# Patient Record
Sex: FEMALE | Race: WHITE | Employment: FULL TIME | URBAN - METROPOLITAN AREA
[De-identification: names, ages, dates, MRNs, and addresses within clinical notes are randomized per-mention and may not be internally consistent; named-entity substitution may affect disease eponyms.]

---

## 2017-02-16 ENCOUNTER — TELEPHONE (OUTPATIENT)
Dept: FAMILY MEDICINE | Facility: OTHER | Age: 47
End: 2017-02-16

## 2017-02-16 NOTE — TELEPHONE ENCOUNTER
12:31 PM    Reason for Call: Phone Call    Description:  Concerned that she has had her menstrual for 2 weeks and was wondering if she should come in  Was an appointment offered for this call? No    Preferred method for responding to this message: Telephone Call    If we cannot reach you directly, may we leave a detailed response at the number you provided? Yes    Can this message wait until your PCP/provider returns, if available today? Not applicable,     Emerita Bolanos

## 2017-02-21 ENCOUNTER — OFFICE VISIT (OUTPATIENT)
Dept: FAMILY MEDICINE | Facility: OTHER | Age: 47
End: 2017-02-21
Attending: ADVANCED PRACTICE MIDWIFE
Payer: COMMERCIAL

## 2017-02-21 VITALS
BODY MASS INDEX: 25.16 KG/M2 | HEART RATE: 77 BPM | WEIGHT: 142 LBS | HEIGHT: 63 IN | OXYGEN SATURATION: 98 % | DIASTOLIC BLOOD PRESSURE: 76 MMHG | SYSTOLIC BLOOD PRESSURE: 120 MMHG

## 2017-02-21 DIAGNOSIS — R35.0 INCREASED FREQUENCY OF URINATION: ICD-10-CM

## 2017-02-21 DIAGNOSIS — N92.0 EXCESSIVE OR FREQUENT MENSTRUATION: Primary | ICD-10-CM

## 2017-02-21 LAB
APTT PPP: 27 SEC (ref 24–37)
ERYTHROCYTE [DISTWIDTH] IN BLOOD BY AUTOMATED COUNT: 12.3 % (ref 10–15)
HCT VFR BLD AUTO: 38.5 % (ref 35–47)
HGB BLD-MCNC: 13.1 G/DL (ref 11.7–15.7)
INR PPP: 0.91 (ref 0.8–1.2)
MCH RBC QN AUTO: 33.5 PG (ref 26.5–33)
MCHC RBC AUTO-ENTMCNC: 34 G/DL (ref 31.5–36.5)
MCV RBC AUTO: 99 FL (ref 78–100)
PLATELET # BLD AUTO: 303 10E9/L (ref 150–450)
RBC # BLD AUTO: 3.91 10E12/L (ref 3.8–5.2)
TSH SERPL DL<=0.05 MIU/L-ACNC: 1.19 MU/L (ref 0.4–4)
WBC # BLD AUTO: 6 10E9/L (ref 4–11)

## 2017-02-21 PROCEDURE — 83001 ASSAY OF GONADOTROPIN (FSH): CPT | Mod: 90 | Performed by: ADVANCED PRACTICE MIDWIFE

## 2017-02-21 PROCEDURE — 99214 OFFICE O/P EST MOD 30 MIN: CPT | Performed by: ADVANCED PRACTICE MIDWIFE

## 2017-02-21 PROCEDURE — 85610 PROTHROMBIN TIME: CPT | Performed by: ADVANCED PRACTICE MIDWIFE

## 2017-02-21 PROCEDURE — 84443 ASSAY THYROID STIM HORMONE: CPT | Performed by: ADVANCED PRACTICE MIDWIFE

## 2017-02-21 PROCEDURE — 99000 SPECIMEN HANDLING OFFICE-LAB: CPT | Performed by: ADVANCED PRACTICE MIDWIFE

## 2017-02-21 PROCEDURE — 36415 COLL VENOUS BLD VENIPUNCTURE: CPT | Performed by: ADVANCED PRACTICE MIDWIFE

## 2017-02-21 PROCEDURE — 85730 THROMBOPLASTIN TIME PARTIAL: CPT | Performed by: ADVANCED PRACTICE MIDWIFE

## 2017-02-21 PROCEDURE — 84146 ASSAY OF PROLACTIN: CPT | Mod: 90 | Performed by: ADVANCED PRACTICE MIDWIFE

## 2017-02-21 PROCEDURE — 85027 COMPLETE CBC AUTOMATED: CPT | Performed by: ADVANCED PRACTICE MIDWIFE

## 2017-02-21 RX ORDER — NORETHINDRONE ACETATE 5 MG
5 TABLET ORAL 3 TIMES DAILY
Qty: 30 TABLET | Refills: 1 | Status: SHIPPED | OUTPATIENT
Start: 2017-02-21 | End: 2017-03-16

## 2017-02-21 ASSESSMENT — PAIN SCALES - GENERAL: PAINLEVEL: MILD PAIN (2)

## 2017-02-21 NOTE — PROGRESS NOTES
"Raine Ayala is a 46 year old female  Here today for excessive uterine bleeding.  Has been bleeding since 17, which is 21 days ago.  Heavy bleeding with clots.  Cycle:  21 days bleedin days with 2 heavy days.  Started menstruation at 15 yo.  .  One vaginal and one C/S.  Two years ago experienced hot flashes and irregular periods, but then it went back to normal.without hot flashes.  Reports excessive fatigue.  Also reports some dizziness and light-headedness and occasional headaches.      O:   /76 (BP Location: Left arm, Patient Position: Chair, Cuff Size: Adult Regular)  Pulse 77  Ht 5' 3\" (1.6 m)  Wt 142 lb (64.4 kg)  LMP 2017  SpO2 98%  BMI 25.15 kg/m2   Pleasant with no sign of acute distress      A:  Menorrhagia:  21 days of bleeding  Needs HPV screen  Increased urinary frequency    P:  CBC stat, TSH, Prolactin FSH, PT, PTT  Pelvic ultrasound ASAP  Norethindrone 5 mg TID    Follow up in one week:  Plan Pap with HR HPV, wet prep, and UA with micro reflex to culture    Greater than 30 minutes were spent face to face counseling this patient on abnormal uterine bleeding, treatment to stop bleeding, ultrasound, procedures, and labs to find cause, and follow up.    NEGRO Gamez, DYLAN    "

## 2017-02-21 NOTE — NURSING NOTE
"Chief Complaint   Patient presents with     Vaginal Bleeding       Initial /76 (BP Location: Left arm, Patient Position: Chair, Cuff Size: Adult Regular)  Pulse 77  Ht 5' 3\" (1.6 m)  Wt 142 lb (64.4 kg)  LMP 01/31/2017  SpO2 98%  BMI 25.15 kg/m2 Estimated body mass index is 25.15 kg/(m^2) as calculated from the following:    Height as of this encounter: 5' 3\" (1.6 m).    Weight as of this encounter: 142 lb (64.4 kg).  Medication Reconciliation: annie Edgar      "

## 2017-02-21 NOTE — MR AVS SNAPSHOT
After Visit Summary   2/21/2017    Raine Ayala    MRN: [id number]           Patient Information     Date Of Birth          1970        Visit Information        Provider Department      2/21/2017 9:00 AM Samuel Chin APRN [facility]        Today's Diagnoses     Excessive or frequent menstruation    -  1    Increased frequency of urination          Care Instructions    Return for follow up in one week.    Schedule ultrasound as soon as possible.        Follow-ups after your visit        Your next 10 appointments already scheduled     Feb 27, 2017  8:30 AM CST   (Arrive by 8:15 AM)   SHORT with NEGRO Vega CNM   [facility] [place] ([facility] [place] [facility])    [address]  [place] [address]   [phone]              Future tests that were ordered for you today     Open Standing Orders        Priority Remaining Interval Expires Ordered    UA with Microscopic reflex to Culture Routine 1/1 2/21/2018 2/21/2017            Who to contact     If you have questions or need follow up information about today's clinic visit or your schedule please contact [facility] directly at [phone].  Normal or non-critical lab and imaging results will be communicated to you by Optianthart, letter or phone within 4 business days after the clinic has received the results. If you do not hear from us within 7 days, please contact the clinic through Optianthart or phone. If you have a critical or abnormal lab result, we will notify you by phone as soon as possible.  Submit refill requests through [employer] or call your pharmacy and they will forward the refill request to us. Please allow 3 business days for your refill to be completed.          Additional Information About Your Visit        Optianthart Information     [employer] gives you secure access to your electronic health record. If you see a primary care provider, you can also send messages to your care team and make  "appointments. If you have questions, please call your primary care clinic.  If you do not have a primary care provider, please call [phone] and they will assist you.        Care EveryWhere ID     This is your Care EveryWhere ID. This could be used by other organizations to access your [facility] medical records  [id number]        Your Vitals Were     Pulse Height Last Period Pulse Oximetry BMI (Body Mass Index)       77 5' 3\" (1.6 m) 01/31/2017 98% 25.15 kg/m2        Blood Pressure from Last 3 Encounters:   02/21/17 120/76   06/15/16 102/64   05/15/15 110/60    Weight from Last 3 Encounters:   02/21/17 142 lb (64.4 kg)   06/15/16 142 lb 3.2 oz (64.5 kg)   05/15/15 144 lb (65.3 kg)              We Performed the Following     CBC with platelets     Follicle stimulating hormone     INR     Partial thromboplastin time     Prolactin     TSH     US Pelvic Complete with Transvaginal          Today's Medication Changes          These changes are accurate as of: 2/21/17 11:32 AM.  If you have any questions, ask your nurse or doctor.               Start taking these medicines.        Dose/Directions    norethindrone 5 MG tablet   Commonly known as:  AYGESTIN   Used for:  Excessive or frequent menstruation   Started by:  Samuel Chin APRN CNM        Dose:  5 mg   Take 1 tablet (5 mg) by mouth 3 times daily   Quantity:  30 tablet   Refills:  1            Where to get your medicines      These medications were sent to [facility] [place] [address] AT [facility] [address] [place] [address]     Phone:  [phone]     norethindrone 5 MG tablet                Primary Care Provider Office Phone # Fax #    Kaila Henry [phone]       [facility] [address]  [place] [address]        Thank you!     Thank you for choosing [facility]  for your care. Our goal is always to provide you with excellent " care. Hearing back from our patients is one way we can continue to improve our services. Please take a few minutes to complete the written survey that you may receive in the mail after your visit with us. Thank you!             Your Updated Medication List - Protect others around you: Learn how to safely use, store and throw away your medicines at www.disposemymeds.org.          This list is accurate as of: 2/21/17 11:32 AM.  Always use your most recent med list.                   Brand Name Dispense Instructions for use    albuterol 108 (90 BASE) MCG/ACT Inhaler    PROAIR HFA/PROVENTIL HFA/VENTOLIN HFA    1 Inhaler    Inhale 2 puffs into the lungs every 4 hours as needed for shortness of breath / dyspnea or wheezing       ibuprofen 800 MG tablet    ADVIL/MOTRIN    90 tablet    TAKE 1 TABLET BY MOUTH EVERY 8 HOURS AS NEEDED       norethindrone 5 MG tablet    AYGESTIN    30 tablet    Take 1 tablet (5 mg) by mouth 3 times daily

## 2017-02-22 ENCOUNTER — HOSPITAL ENCOUNTER (OUTPATIENT)
Dept: ULTRASOUND IMAGING | Facility: HOSPITAL | Age: 47
Discharge: HOME | End: 2017-02-22
Attending: ADVANCED PRACTICE MIDWIFE | Admitting: ADVANCED PRACTICE MIDWIFE
Payer: COMMERCIAL

## 2017-02-22 LAB
FSH SERPL-ACNC: 8 IU/L
PROLACTIN SERPL-MCNC: 24 UG/L (ref 3–27)

## 2017-02-22 PROCEDURE — 76856 US EXAM PELVIC COMPLETE: CPT | Mod: TC

## 2017-02-22 PROCEDURE — 76830 TRANSVAGINAL US NON-OB: CPT | Mod: TC

## 2017-02-23 ENCOUNTER — TELEPHONE (OUTPATIENT)
Dept: OBGYN | Facility: OTHER | Age: 47
End: 2017-02-23

## 2017-02-23 NOTE — TELEPHONE ENCOUNTER
Informed pt of lab and US results.  Discussed EMB and Mirena insertion on Monday at her appt.  Reviewed risks and benefits and procedure.  Questions answered.

## 2017-02-23 NOTE — TELEPHONE ENCOUNTER
1:19 PM    Reason for Call: Phone Call    Description: Pt called and stated provider had called and wanted her to return phone call. Please call her back at [phone]    Was an appointment offered for this call? No    Preferred method for responding to this message: Telephone Call    If we cannot reach you directly, may we leave a detailed response at the number you provided? Yes    Can this message wait until your PCP/provider returns, if available today? Not applicable    Toyin Castro

## 2017-02-27 ENCOUNTER — OFFICE VISIT (OUTPATIENT)
Dept: OBGYN | Facility: OTHER | Age: 47
End: 2017-02-27
Attending: ADVANCED PRACTICE MIDWIFE
Payer: COMMERCIAL

## 2017-02-27 VITALS
HEART RATE: 74 BPM | SYSTOLIC BLOOD PRESSURE: 114 MMHG | WEIGHT: 142 LBS | DIASTOLIC BLOOD PRESSURE: 74 MMHG | OXYGEN SATURATION: 98 % | BODY MASS INDEX: 25.16 KG/M2 | HEIGHT: 63 IN

## 2017-02-27 DIAGNOSIS — Z30.430 ENCOUNTER FOR IUD INSERTION: ICD-10-CM

## 2017-02-27 DIAGNOSIS — N92.0 EXCESSIVE OR FREQUENT MENSTRUATION: ICD-10-CM

## 2017-02-27 DIAGNOSIS — N93.9 VAGINAL BLEEDING: Primary | ICD-10-CM

## 2017-02-27 DIAGNOSIS — B96.89 BV (BACTERIAL VAGINOSIS): Primary | ICD-10-CM

## 2017-02-27 DIAGNOSIS — N76.0 BV (BACTERIAL VAGINOSIS): Primary | ICD-10-CM

## 2017-02-27 DIAGNOSIS — Z30.09 FAMILY PLANNING: ICD-10-CM

## 2017-02-27 LAB
ALBUMIN UR-MCNC: NEGATIVE MG/DL
APPEARANCE UR: ABNORMAL
BACTERIA #/AREA URNS HPF: ABNORMAL /HPF
BILIRUB UR QL STRIP: NEGATIVE
COLOR UR AUTO: ABNORMAL
GLUCOSE UR STRIP-MCNC: NEGATIVE MG/DL
HCG UR QL: NEGATIVE
HGB UR QL STRIP: NEGATIVE
HYALINE CASTS #/AREA URNS LPF: 1 /LPF (ref 0–2)
KETONES UR STRIP-MCNC: NEGATIVE MG/DL
LEUKOCYTE ESTERASE UR QL STRIP: NEGATIVE
MICRO REPORT STATUS: ABNORMAL
MUCOUS THREADS #/AREA URNS LPF: PRESENT /LPF
NITRATE UR QL: NEGATIVE
PH UR STRIP: 5.5 PH (ref 4.7–8)
RBC #/AREA URNS AUTO: 0 /HPF (ref 0–2)
SP GR UR STRIP: 1 (ref 1–1.03)
SPECIMEN SOURCE: ABNORMAL
SQUAMOUS #/AREA URNS AUTO: 20 /HPF (ref 0–1)
URN SPEC COLLECT METH UR: ABNORMAL
UROBILINOGEN UR STRIP-MCNC: NORMAL MG/DL (ref 0–2)
WBC #/AREA URNS AUTO: 5 /HPF (ref 0–2)
WET PREP SPEC: ABNORMAL

## 2017-02-27 PROCEDURE — 87210 SMEAR WET MOUNT SALINE/INK: CPT | Performed by: ADVANCED PRACTICE MIDWIFE

## 2017-02-27 PROCEDURE — 99214 OFFICE O/P EST MOD 30 MIN: CPT | Mod: 25 | Performed by: ADVANCED PRACTICE MIDWIFE

## 2017-02-27 PROCEDURE — 81025 URINE PREGNANCY TEST: CPT | Performed by: ADVANCED PRACTICE MIDWIFE

## 2017-02-27 PROCEDURE — 99000 SPECIMEN HANDLING OFFICE-LAB: CPT | Performed by: ADVANCED PRACTICE MIDWIFE

## 2017-02-27 PROCEDURE — 58300 INSERT INTRAUTERINE DEVICE: CPT | Performed by: ADVANCED PRACTICE MIDWIFE

## 2017-02-27 PROCEDURE — 88142 CYTOPATH C/V THIN LAYER: CPT | Performed by: ADVANCED PRACTICE MIDWIFE

## 2017-02-27 PROCEDURE — 88305 TISSUE EXAM BY PATHOLOGIST: CPT | Mod: TC | Performed by: ADVANCED PRACTICE MIDWIFE

## 2017-02-27 PROCEDURE — 58100 BIOPSY OF UTERUS LINING: CPT | Performed by: ADVANCED PRACTICE MIDWIFE

## 2017-02-27 PROCEDURE — 81001 URINALYSIS AUTO W/SCOPE: CPT | Performed by: ADVANCED PRACTICE MIDWIFE

## 2017-02-27 PROCEDURE — 87624 HPV HI-RISK TYP POOLED RSLT: CPT | Mod: 90 | Performed by: ADVANCED PRACTICE MIDWIFE

## 2017-02-27 RX ORDER — METRONIDAZOLE 500 MG/1
500 TABLET ORAL 2 TIMES DAILY
Qty: 14 TABLET | Refills: 0 | Status: SHIPPED | OUTPATIENT
Start: 2017-02-27 | End: 2017-03-16

## 2017-02-27 ASSESSMENT — PAIN SCALES - GENERAL: PAINLEVEL: NO PAIN (0)

## 2017-02-27 NOTE — NURSING NOTE
"Chief Complaint   Patient presents with     Vaginal Bleeding       Initial /74 (BP Location: Left arm, Patient Position: Chair, Cuff Size: Adult Regular)  Pulse 74  Ht 5' 3\" (1.6 m)  Wt 142 lb (64.4 kg)  LMP 01/31/2017  SpO2 98%  BMI 25.15 kg/m2 Estimated body mass index is 25.15 kg/(m^2) as calculated from the following:    Height as of this encounter: 5' 3\" (1.6 m).    Weight as of this encounter: 142 lb (64.4 kg).  Medication Reconciliation: annie Edgar      "

## 2017-02-27 NOTE — PROGRESS NOTES
"Pt here today with menorrhagia follow up.      PROCEDURE:  After informed consent was obtained from the patient, a speculum was placed in the vagina to visualize the cervix.  Tenaculum was applied to the anterior cervical lip. Endometrial biopsy pipelle was passed through the cervical os and tissue obtained.  Uterus sounded to 7 cm.  Tenaculum was removed and sites were hemostatic. There were no complications. The patient tolerated the procedure well with a minimal amount of cramping noted.  Specimen was sent to pathology.    CC:  IUD insertion for bleeding control  HPI:  Raine Ayala is a 46 year old female Patient's last menstrual period was 01/31/2017.  No other c/o.  She is here for an IUD insertion. Patient has verbalized understanding of risks and benefits and has signed the consent form.          Prior ectopic no  Prior CT no    Allergies: Codeine and Penicillins    EXAM:  Blood pressure 114/74, pulse 74, height 5' 3\" (1.6 m), weight 142 lb (64.4 kg), last menstrual period 01/31/2017, SpO2 98 %.  General - pleasant female in no acute distress.  Pelvic - EG: normal adult female, BUS: within normal limits,   Vagina: well rugated, no discharge,   Cervix: no lesions or CMT,   Uterus: firm, normal sized and nontender,  Adnexae: no masses or tenderness.    PROCEDURE:  After informed consent was obtained from the patient, a speculum was placed in the vagina to visualized the cervix  Tenaculum was placed at the 12 o'clock.  The Mirena  IUD was then placed in the usual fashion.  Strings were clipped about 2-3 cm from the cervical os.  Tenaculum was removed and cervix was hemostatic. There were no complications. The patient tolerated the procedure well.    2/10 went to 0/10 immediately after.    The patient should feel for the IUD strings after her next menses.  If unable to locate them, she should return to clinic for a speculum examination for confirmation that the IUD is in place. Bleeding pattern of this " particular IUD was discussed with the patient. She is aware that the IUD will need to be removed in 5 years or PRN.  She is to return in 3 wks  to clinic and for her next annual or PRN.    ASSESSMENT:  Menorrhagia  Thickened endometrium  Need for HPV screening    PLAN:  EMB  Mirena IUD placement  Pap with HR HPV   HCG urine qualitative  Wet prep       Pt instructed to wean off of Norethindrone.  Take 3 times today, then twice a day for one week, then one a day for one week.  .  NEGRO Gamez, RHONDAM      Greater than 25 minutes spent counseling this pt in addition to procedures.  Risks and benefits of EMB.  The process of an EMB.  Risks and benefits of Mirena IUD.  The process of placing IUD.  Instructions to wean off of Norethindrone.

## 2017-02-27 NOTE — MR AVS SNAPSHOT
"              After Visit Summary   2/27/2017    Raine Ayala    MRN: [id number]           Patient Information     Date Of Birth          1970        Visit Information        Provider Department      2/27/2017 8:30 AM Samuel Chin APRN CNM [facility]        Today's Diagnoses     Vaginal bleeding    -  1    Family planning        Encounter for IUD insertion        Excessive or frequent menstruation          Care Instructions    Return in 3 weeks for follow up.        Follow-ups after your visit        Who to contact     If you have questions or need follow up information about today's clinic visit or your schedule please contact [facility] directly at [phone].  Normal or non-critical lab and imaging results will be communicated to you by Epic Playgroundhart, letter or phone within 4 business days after the clinic has received the results. If you do not hear from us within 7 days, please contact the clinic through Epic Playgroundhart or phone. If you have a critical or abnormal lab result, we will notify you by phone as soon as possible.  Submit refill requests through [employer] or call your pharmacy and they will forward the refill request to us. Please allow 3 business days for your refill to be completed.          Additional Information About Your Visit        MyChart Information     [employer] gives you secure access to your electronic health record. If you see a primary care provider, you can also send messages to your care team and make appointments. If you have questions, please call your primary care clinic.  If you do not have a primary care provider, please call [phone] and they will assist you.        Care EveryWhere ID     This is your Care EveryWhere ID. This could be used by other organizations to access your [facility] medical records  [id number]        Your Vitals Were     Pulse Height Last Period Pulse Oximetry BMI (Body Mass Index)       74 5' 3\" (1.6 m) 01/31/2017 98% 25.15 " kg/m2        Blood Pressure from Last 3 Encounters:   02/27/17 114/74   02/21/17 120/76   06/15/16 102/64    Weight from Last 3 Encounters:   02/27/17 142 lb (64.4 kg)   02/21/17 142 lb (64.4 kg)   06/15/16 142 lb 3.2 oz (64.5 kg)              We Performed the Following     A pap thin layer screen with  HPV - recommended age 30 - 65 years (select HPV order below)     ENDOMETRIAL BIOPSY W/O CERVICAL DILATION     HC LEVONORGESTREL IU 52MG 5 YR     HCG qualitative urine     HPV High Risk Types DNA Cervical     INSERTION INTRAUTERINE DEVICE     Surgical pathology exam     UA with Microscopic reflex to Culture     Wet prep          Today's Medication Changes          These changes are accurate as of: 2/27/17  1:20 PM.  If you have any questions, ask your nurse or doctor.               Start taking these medicines.        Dose/Directions    levonorgestrel 20 MCG/24HR IUD   Commonly known as:  MIRENA   Used for:  Vaginal bleeding   Started by:  Samuel Chin APRN CNM        Dose:  1 each   1 each (20 mcg) by Intrauterine route continuous   Refills:  0            Where to get your medicines      Some of these will need a paper prescription and others can be bought over the counter.  Ask your nurse if you have questions.     You don't need a prescription for these medications     levonorgestrel 20 MCG/24HR IUD                Primary Care Provider Office Phone # Fax #    Kaila Henry [phone]       [facility] [address] [place]  [facility] [address]        Thank you!     Thank you for choosing [facility] [place]  for your care. Our goal is always to provide you with excellent care. Hearing back from our patients is one way we can continue to improve our services. Please take a few minutes to complete the written survey that you may receive in the mail after your visit with us. Thank you!             Your Updated Medication List - Protect others around you: Learn how to  safely use, store and throw away your medicines at www.disposemymeds.org.          This list is accurate as of: 2/27/17  1:20 PM.  Always use your most recent med list.                   Brand Name Dispense Instructions for use    albuterol 108 (90 BASE) MCG/ACT Inhaler    PROAIR HFA/PROVENTIL HFA/VENTOLIN HFA    1 Inhaler    Inhale 2 puffs into the lungs every 4 hours as needed for shortness of breath / dyspnea or wheezing       ibuprofen 800 MG tablet    ADVIL/MOTRIN    90 tablet    TAKE 1 TABLET BY MOUTH EVERY 8 HOURS AS NEEDED       levonorgestrel 20 MCG/24HR IUD    MIRENA     1 each (20 mcg) by Intrauterine route continuous       norethindrone 5 MG tablet    AYGESTIN    30 tablet    Take 1 tablet (5 mg) by mouth 3 times daily

## 2017-02-28 LAB — COPATH REPORT: NORMAL

## 2017-03-06 LAB
COPATH REPORT: NORMAL
PAP: NORMAL

## 2017-03-07 LAB
FINAL DIAGNOSIS: NORMAL
HPV HR 12 DNA CVX QL NAA+PROBE: NEGATIVE
HPV16 DNA SPEC QL NAA+PROBE: NEGATIVE
HPV18 DNA SPEC QL NAA+PROBE: NEGATIVE
SPECIMEN DESCRIPTION: NORMAL

## 2017-03-16 ENCOUNTER — OFFICE VISIT (OUTPATIENT)
Dept: OBGYN | Facility: OTHER | Age: 47
End: 2017-03-16
Attending: ADVANCED PRACTICE MIDWIFE
Payer: COMMERCIAL

## 2017-03-16 ENCOUNTER — HOSPITAL ENCOUNTER (OUTPATIENT)
Dept: ULTRASOUND IMAGING | Facility: HOSPITAL | Age: 47
Discharge: HOME | End: 2017-03-16
Attending: ADVANCED PRACTICE MIDWIFE | Admitting: ADVANCED PRACTICE MIDWIFE
Payer: COMMERCIAL

## 2017-03-16 VITALS
DIASTOLIC BLOOD PRESSURE: 70 MMHG | SYSTOLIC BLOOD PRESSURE: 122 MMHG | WEIGHT: 148 LBS | HEIGHT: 63 IN | BODY MASS INDEX: 26.22 KG/M2

## 2017-03-16 DIAGNOSIS — N92.0 EXCESSIVE OR FREQUENT MENSTRUATION: Primary | ICD-10-CM

## 2017-03-16 DIAGNOSIS — N94.89 UTERINE CRAMPING: ICD-10-CM

## 2017-03-16 PROCEDURE — 76830 TRANSVAGINAL US NON-OB: CPT | Mod: TC

## 2017-03-16 PROCEDURE — 58301 REMOVE INTRAUTERINE DEVICE: CPT | Performed by: ADVANCED PRACTICE MIDWIFE

## 2017-03-16 PROCEDURE — 99213 OFFICE O/P EST LOW 20 MIN: CPT | Mod: 25 | Performed by: ADVANCED PRACTICE MIDWIFE

## 2017-03-16 PROCEDURE — 76856 US EXAM PELVIC COMPLETE: CPT | Mod: TC

## 2017-03-16 ASSESSMENT — PAIN SCALES - GENERAL: PAINLEVEL: EXTREME PAIN (8)

## 2017-03-16 NOTE — PROGRESS NOTES
"Raine Ayala is a 46 year old female  Here today for uterine cramping since Thursday (one week).  Rates cramping pain as high as 8/10.  Taking Ibuprofen 800 mg.  Some heavy bleeding and spotting, but mostly no bleeding.  Pt had an EMB and Mirena placed on Feb. 27th.      O:   /70  Ht 5' 3\" (1.6 m)  Wt 148 lb (67.1 kg)  LMP 01/31/2017  BMI 26.22 kg/m2   Pleasant without acute distress.  Pelvic:  Normal female genitalia.  Vagina pink without lesions.  Cervix with bloody discharge, negative for CMT or lesions.  IUD strings visible.  IUD not visible or palpable at os.      A:  Excessive uterine cramping  Spotting and bleeding  Ibuprofen 800 mg every 8 hours for 6 days  IUD/Mirena placed on Feb. 27  No problem with IUD for > 2 weeks  IUD in place according to ultrasound    P:  Pelvic exam  Complete pelvic ultrasound ASAP  Return to office after ultrasound  Remove IUD   Norethindrone 5 mg TID until bleeding stops, then  BID if no bleeding then once a day.  Schedule consult with Dr. Trinh    Procedure:  After verbal consent was obtained from the patient, IUD strings were grasped with ring forcep and IUD easily removed intact with minimal patient discomfort noted.  No bleeding noted.      Greater than 15 minutes in addition to procedure were spent face to face counseling this patient about  options for cramping and bleeding.    NEGRO Gamez, CNFCO    "

## 2017-03-16 NOTE — MR AVS SNAPSHOT
After Visit Summary   3/16/2017    Raine Ayala    MRN: [id number]           Patient Information     Date Of Birth          1970        Visit Information        Provider Department      3/16/2017 8:30 AM Samuel Chin APRN [facility]        Today's Diagnoses     Excessive or frequent menstruation    -  1    Uterine cramping          Care Instructions    Return to clinic as needed.    Keep scheduled appointment with Dr. Trinh        Follow-ups after your visit        Your next 10 appointments already scheduled     Mar 21, 2017  8:30 AM CDT   (Arrive by 8:15 AM)   SHORT with NEGRO Vega [facility] ([facility] )    [address]  [place] [address]   [phone]            Apr 04, 2017  9:00 AM CDT   (Arrive by 8:45 AM)   Office Visit with Antoni Trinh MD   [facility] ([facility]    [address]  [facility] [id number] [phone]           Bring a current list of meds and any records pertaining to this visit.  For Physicals, please bring immunization records and any forms needing to be filled out.    Please arrive 15 minutes early to complete paperwork and register              Who to contact     If you have questions or need follow up information about today's clinic visit or your schedule please contact [facility] directly at [phone].  Normal or non-critical lab and imaging results will be communicated to you by MyChart, letter or phone within 4 business days after the clinic has received the results. If you do not hear from us within 7 days, please contact the clinic through MyChart or phone. If you have a critical or abnormal lab result, we will notify you by phone as soon as possible.  Submit refill requests through [employer] or call your pharmacy and they will forward the refill request to us. Please allow 3 business days for your refill to be completed.           "Additional Information About Your Visit        MyChart Information     [employer] gives you secure access to your electronic health record. If you see a primary care provider, you can also send messages to your care team and make appointments. If you have questions, please call your primary care clinic.  If you do not have a primary care provider, please call [phone] and they will assist you.        Care EveryWhere ID     This is your Care EveryWhere ID. This could be used by other organizations to access your [facility] medical records  [id number]        Your Vitals Were     Height Last Period BMI (Body Mass Index)             5' 3\" (1.6 m) 01/31/2017 26.22 kg/m2          Blood Pressure from Last 3 Encounters:   03/16/17 122/70   02/27/17 114/74   02/21/17 120/76    Weight from Last 3 Encounters:   03/16/17 148 lb (67.1 kg)   02/27/17 142 lb (64.4 kg)   02/21/17 142 lb (64.4 kg)              We Performed the Following     REMOVE INTRAUTERINE DEVICE     US Pelvic Complete with Transvaginal          Today's Medication Changes          These changes are accurate as of: 3/16/17 11:19 AM.  If you have any questions, ask your nurse or doctor.               Stop taking these medicines if you haven't already. Please contact your care team if you have questions.     levonorgestrel 20 MCG/24HR IUD   Commonly known as:  MIRENA   Stopped by:  Samuel Chin APRN CNM                    Primary Care Provider Office Phone # Fax #    Kaila Henry [phone]       [facility] [address] [place]  [facility] [address]        Thank you!     Thank you for choosing [facility]  for your care. Our goal is always to provide you with excellent care. Hearing back from our patients is one way we can continue to improve our services. Please take a few minutes to complete the written survey that you may receive in the mail after your visit with us. Thank you!             Your Updated " Medication List - Protect others around you: Learn how to safely use, store and throw away your medicines at www.disposemymeds.org.          This list is accurate as of: 3/16/17 11:19 AM.  Always use your most recent med list.                   Brand Name Dispense Instructions for use    albuterol 108 (90 BASE) MCG/ACT Inhaler    PROAIR HFA/PROVENTIL HFA/VENTOLIN HFA    1 Inhaler    Inhale 2 puffs into the lungs every 4 hours as needed for shortness of breath / dyspnea or wheezing       ibuprofen 800 MG tablet    ADVIL/MOTRIN    90 tablet    TAKE 1 TABLET BY MOUTH EVERY 8 HOURS AS NEEDED

## 2017-04-04 ENCOUNTER — OFFICE VISIT (OUTPATIENT)
Dept: OBGYN | Facility: OTHER | Age: 47
End: 2017-04-04
Attending: OBSTETRICS & GYNECOLOGY
Payer: COMMERCIAL

## 2017-04-04 VITALS
HEIGHT: 63 IN | BODY MASS INDEX: 25.16 KG/M2 | WEIGHT: 142 LBS | DIASTOLIC BLOOD PRESSURE: 60 MMHG | SYSTOLIC BLOOD PRESSURE: 108 MMHG

## 2017-04-04 DIAGNOSIS — N85.01 ENDOMETRIAL HYPERPLASIA, SIMPLE: ICD-10-CM

## 2017-04-04 DIAGNOSIS — D25.1 INTRAMURAL LEIOMYOMA OF UTERUS: ICD-10-CM

## 2017-04-04 DIAGNOSIS — N92.0 EXCESSIVE OR FREQUENT MENSTRUATION: Primary | ICD-10-CM

## 2017-04-04 DIAGNOSIS — N92.1 MENOMETRORRHAGIA: ICD-10-CM

## 2017-04-04 PROCEDURE — 99214 OFFICE O/P EST MOD 30 MIN: CPT | Performed by: OBSTETRICS & GYNECOLOGY

## 2017-04-04 ASSESSMENT — PAIN SCALES - GENERAL: PAINLEVEL: NO PAIN (0)

## 2017-04-04 NOTE — PROGRESS NOTES
CC:  Consult from Samuel ELLIS for mennorhagia  HPI:  Raine Ayala is a 46 year old female P2 (1CS/1VD). No LMP recorded..  Menses are Irregular lasting 7 days with  heavy flow. She had a period in -Feb that lasted 21 days.  She has been put on Norethindrone twice to control bleeding and had Mirena IUD placed.  This was removed secondary to pain.  She is a smoker and  has had vas for BC.  She is desiring definitive management.   Her menstrual flow is limiting her clothing choices and interferes with lifestyle/activites.   Endometrial biopsy showed endometrial hyperplasia.   Pap nml.  US nml except small fibroid.        Clots: Yes  Intermenstrual bleeding: Yes  Post-coital bleeding: No  Previous work-up:Yes  Contraception: Vas  Abnormal Pap: No  Dysmenorrhea: No  Pelvic pain:No  Dyspareunia: No    Past GYN history:        Last PAP smear:  Normal    Patients records are available and reviewed at today's visit.    Past Medical History:   Diagnosis Date     Lumbago 2013       Past Surgical History:   Procedure Laterality Date      SECTION       COLONOSCOPY      Recall colonoscopy 5 years     COLONOSCOPY      Recall colonoscopy 5 years     colonoscopy with polypectomy repeat 2 years              wisdom teeth extraction         Family History   Problem Relation Age of Onset     DIABETES Mother      C.A.D. Father      Lipids Father      Hyperlipidemia     Hypertension Father        Current Outpatient Prescriptions   Medication Sig Dispense Refill     albuterol (PROAIR HFA, PROVENTIL HFA, VENTOLIN HFA) 108 (90 BASE) MCG/ACT inhaler Inhale 2 puffs into the lungs every 4 hours as needed for shortness of breath / dyspnea or wheezing 1 Inhaler 1     ibuprofen (ADVIL,MOTRIN) 800 MG tablet TAKE 1 TABLET BY MOUTH EVERY 8 HOURS AS NEEDED 90 tablet 0     [DISCONTINUED] NO ACTIVE MEDICATIONS          Allergies: Codeine and Penicillins    ROS:  C: NEGATIVE for fever, chills,  "change in weight  GI: NEGATIVE for nausea, abdominal pain, heartburn, or change in bowel habits  : NEGATIVE for frequency, dysuria, hematuria, vaginal discharge  P: NEGATIVE for changes in mood or affect    EXAM:  Blood pressure 108/60, height 5' 3\" (1.6 m), weight 142 lb (64.4 kg).   BMI= Body mass index is 25.15 kg/(m^2).  General - pleasant female in no acute distress.  Abdomen - soft, nontender, nondistended, no hepatosplenomegaly.  Pelvic - EG: normal adult female, BUS: within normal limits, Vagina: well rugated, no discharge, Cervix: no lesions or CMT, Uterus: firm, normal sized and nontender, mild pelvic relaxation.   Adnexae: no masses or tenderness.  Rectovaginal - deferred.  Musculoskeletal - no gross deformities or edema  Neurological - normal mental status.      ASSESSMENT/PLAN:  Menometrorrhagia, endometrial hyperplasia, uterine fibroid.  Failed attempts at conservative management with progesterone/IUD and desiring definitive management in the form of hysterectomy which I believe could be done vaginally.  Reviewed goals, risks, alternatives for planned procedure.  Including risk of bleeding, infection, damage to nerves, blood vessels, bowel and bladder. Discussed recovery period and expected discomfort.. All questions were answered. Consent form reviewed and signed.  Preoperative instructions discussed.  NPO after midnight.  35 minutes were spent with the patient with greater than 50% of the visit spent in face-to-face counseling and coordination of care.   Pt has my card and phone number to call as needed if problems in the interim or she does not here her results.             "

## 2017-04-04 NOTE — MR AVS SNAPSHOT
"              After Visit Summary   4/4/2017    Raine Ayala    MRN: [id number]           Patient Information     Date Of Birth          1970        Visit Information        Provider Department      4/4/2017 9:00 AM Antoni Trinh MD [facility]        Today's Diagnoses     Excessive or frequent menstruation    -  1    Intramural leiomyoma of uterus        Endometrial hyperplasia, simple          Care Instructions    Nothing to eat or drink after midnight prior to procedure.            Follow-ups after your visit        Who to contact     If you have questions or need follow up information about today's clinic visit or your schedule please contact [facility] directly at [phone].  Normal or non-critical lab and imaging results will be communicated to you by MyChart, letter or phone within 4 business days after the clinic has received the results. If you do not hear from us within 7 days, please contact the clinic through epacubehart or phone. If you have a critical or abnormal lab result, we will notify you by phone as soon as possible.  Submit refill requests through [employer] or call your pharmacy and they will forward the refill request to us. Please allow 3 business days for your refill to be completed.          Additional Information About Your Visit        MyChart Information     [employer] gives you secure access to your electronic health record. If you see a primary care provider, you can also send messages to your care team and make appointments. If you have questions, please call your primary care clinic.  If you do not have a primary care provider, please call [phone] and they will assist you.        Care EveryWhere ID     This is your Care EveryWhere ID. This could be used by other organizations to access your [place] medical records  [id number]        Your Vitals Were     Height BMI (Body Mass Index)                5' 3\" (1.6 m) 25.15 kg/m2           Blood " Pressure from Last 3 Encounters:   04/04/17 108/60   03/16/17 122/70   02/27/17 114/74    Weight from Last 3 Encounters:   04/04/17 142 lb (64.4 kg)   03/16/17 148 lb (67.1 kg)   02/27/17 142 lb (64.4 kg)              Today, you had the following     No orders found for display       Primary Care Provider Office Phone # Fax #    Kaila Henry [phone]       [facility] [address]  [facility] [address]        Thank you!     Thank you for choosing [facility]  for your care. Our goal is always to provide you with excellent care. Hearing back from our patients is one way we can continue to improve our services. Please take a few minutes to complete the written survey that you may receive in the mail after your visit with us. Thank you!             Your Updated Medication List - Protect others around you: Learn how to safely use, store and throw away your medicines at www.disposemymeds.org.          This list is accurate as of: 4/4/17  1:23 PM.  Always use your most recent med list.                   Brand Name Dispense Instructions for use    albuterol 108 (90 BASE) MCG/ACT Inhaler    PROAIR HFA/PROVENTIL HFA/VENTOLIN HFA    1 Inhaler    Inhale 2 puffs into the lungs every 4 hours as needed for shortness of breath / dyspnea or wheezing       ibuprofen 800 MG tablet    ADVIL/MOTRIN    90 tablet    TAKE 1 TABLET BY MOUTH EVERY 8 HOURS AS NEEDED

## 2017-04-04 NOTE — NURSING NOTE
"Chief Complaint   Patient presents with     Consult     Consult per Giuseppe for abnormal uterine bleeding.        Initial /60  Ht 5' 3\" (1.6 m)  Wt 142 lb (64.4 kg)  BMI 25.15 kg/m2 Estimated body mass index is 25.15 kg/(m^2) as calculated from the following:    Height as of this encounter: 5' 3\" (1.6 m).    Weight as of this encounter: 142 lb (64.4 kg).  Medication Reconciliation: complete   Carmelina Olguin      "

## 2017-04-14 ENCOUNTER — OFFICE VISIT (OUTPATIENT)
Dept: FAMILY MEDICINE | Facility: OTHER | Age: 47
End: 2017-04-14
Attending: NURSE PRACTITIONER
Payer: COMMERCIAL

## 2017-04-14 VITALS
HEART RATE: 76 BPM | WEIGHT: 145.4 LBS | BODY MASS INDEX: 25.76 KG/M2 | DIASTOLIC BLOOD PRESSURE: 60 MMHG | TEMPERATURE: 98 F | HEIGHT: 63 IN | SYSTOLIC BLOOD PRESSURE: 100 MMHG | RESPIRATION RATE: 14 BRPM

## 2017-04-14 DIAGNOSIS — F17.200 TOBACCO DEPENDENCE: ICD-10-CM

## 2017-04-14 DIAGNOSIS — N92.0 EXCESSIVE OR FREQUENT MENSTRUATION: ICD-10-CM

## 2017-04-14 DIAGNOSIS — Z01.818 PREOP GENERAL PHYSICAL EXAM: Primary | ICD-10-CM

## 2017-04-14 LAB
ANION GAP SERPL CALCULATED.3IONS-SCNC: 11 MMOL/L (ref 3–14)
BUN SERPL-MCNC: 10 MG/DL (ref 7–30)
CALCIUM SERPL-MCNC: 9.1 MG/DL (ref 8.5–10.1)
CHLORIDE SERPL-SCNC: 105 MMOL/L (ref 94–109)
CO2 SERPL-SCNC: 23 MMOL/L (ref 20–32)
CREAT SERPL-MCNC: 0.66 MG/DL (ref 0.52–1.04)
ERYTHROCYTE [DISTWIDTH] IN BLOOD BY AUTOMATED COUNT: 12.3 % (ref 10–15)
GFR SERPL CREATININE-BSD FRML MDRD: NORMAL ML/MIN/1.7M2
GLUCOSE SERPL-MCNC: 91 MG/DL (ref 70–99)
HCG UR QL: NEGATIVE
HCT VFR BLD AUTO: 41.3 % (ref 35–47)
HGB BLD-MCNC: 14 G/DL (ref 11.7–15.7)
MCH RBC QN AUTO: 33.7 PG (ref 26.5–33)
MCHC RBC AUTO-ENTMCNC: 33.9 G/DL (ref 31.5–36.5)
MCV RBC AUTO: 100 FL (ref 78–100)
PLATELET # BLD AUTO: 289 10E9/L (ref 150–450)
POTASSIUM SERPL-SCNC: 4.1 MMOL/L (ref 3.4–5.3)
RBC # BLD AUTO: 4.15 10E12/L (ref 3.8–5.2)
SODIUM SERPL-SCNC: 139 MMOL/L (ref 133–144)
WBC # BLD AUTO: 5.4 10E9/L (ref 4–11)

## 2017-04-14 PROCEDURE — 93000 ELECTROCARDIOGRAM COMPLETE: CPT | Performed by: INTERNAL MEDICINE

## 2017-04-14 PROCEDURE — 71020 ZZHC CHEST TWO VIEWS, FRONT/LAT: CPT | Mod: TC | Performed by: RADIOLOGY

## 2017-04-14 PROCEDURE — 85027 COMPLETE CBC AUTOMATED: CPT | Performed by: NURSE PRACTITIONER

## 2017-04-14 PROCEDURE — 36415 COLL VENOUS BLD VENIPUNCTURE: CPT | Performed by: NURSE PRACTITIONER

## 2017-04-14 PROCEDURE — 99214 OFFICE O/P EST MOD 30 MIN: CPT | Performed by: NURSE PRACTITIONER

## 2017-04-14 PROCEDURE — 80048 BASIC METABOLIC PNL TOTAL CA: CPT | Performed by: NURSE PRACTITIONER

## 2017-04-14 PROCEDURE — 81025 URINE PREGNANCY TEST: CPT | Performed by: NURSE PRACTITIONER

## 2017-04-14 RX ORDER — NICOTINE 21 MG/24HR
1 PATCH, TRANSDERMAL 24 HOURS TRANSDERMAL EVERY 24 HOURS
Qty: 30 PATCH | Refills: 1 | Status: SHIPPED | OUTPATIENT
Start: 2017-04-14 | End: 2017-05-25

## 2017-04-14 RX ORDER — NICOTINE 21 MG/24HR
1 PATCH, TRANSDERMAL 24 HOURS TRANSDERMAL EVERY 24 HOURS
Qty: 30 PATCH | Refills: 1 | Status: SHIPPED | OUTPATIENT
Start: 2017-04-14 | End: 2017-08-22

## 2017-04-14 ASSESSMENT — PAIN SCALES - GENERAL: PAINLEVEL: NO PAIN (0)

## 2017-04-14 NOTE — MR AVS SNAPSHOT
After Visit Summary   4/14/2017    Raine Ayala    MRN: [id number]           Patient Information     Date Of Birth          1970        Visit Information        Provider Department      4/14/2017 9:30 AM Kaila Henry NP [facility]        Today's Diagnoses     Preop general physical exam    -  1    Tobacco dependence        Excessive or frequent menstruation          Care Instructions      Before Your Surgery      Call your surgeon if there is any change in your health. This includes signs of a cold or flu (such as a sore throat, runny nose, cough, rash or fever).    Do not smoke, drink alcohol or take over the counter medicine (unless your surgeon or primary care doctor tells you to) for the 24 hours before and after surgery.    If you take prescribed drugs: Follow your doctor s orders about which medicines to take and which to stop until after surgery.    Eating and drinking prior to surgery: follow the instructions from your surgeon    Take a shower or bath the night before surgery. Use the soap your surgeon gave you to gently clean your skin. If you do not have soap from your surgeon, use your regular soap. Do not shave or scrub the surgery site.  Wear clean pajamas and have clean sheets on your bed.         Follow-ups after your visit        Your next 10 appointments already scheduled     Apr 26, 2017   Procedure with Antoni Trinh MD   [facility] ([address] [address] [phone]              Who to contact     If you have questions or need follow up information about today's clinic visit or your schedule please contact [facility] directly at [phone].  Normal or non-critical lab and imaging results will be communicated to you by MyChart, letter or phone within 4 business days after the clinic has received the results. If you do not hear from us within 7 days, please contact the  "clinic through [employer] or phone. If you have a critical or abnormal lab result, we will notify you by phone as soon as possible.  Submit refill requests through [employer] or call your pharmacy and they will forward the refill request to us. Please allow 3 business days for your refill to be completed.          Additional Information About Your Visit        [employer] Information     [employer] gives you secure access to your electronic health record. If you see a primary care provider, you can also send messages to your care team and make appointments. If you have questions, please call your primary care clinic.  If you do not have a primary care provider, please call [phone] and they will assist you.        Care EveryWhere ID     This is your Care EveryWhere ID. This could be used by other organizations to access your [place] medical records  [id number]        Your Vitals Were     Pulse Temperature Respirations Height BMI (Body Mass Index)       76 98  F (36.7  C) (Tympanic) 14 5' 3\" (1.6 m) 25.76 kg/m2        Blood Pressure from Last 3 Encounters:   04/14/17 100/60   04/04/17 108/60   03/16/17 122/70    Weight from Last 3 Encounters:   04/14/17 145 lb 6.4 oz (66 kg)   04/04/17 142 lb (64.4 kg)   03/16/17 148 lb (67.1 kg)              We Performed the Following     Basic metabolic panel     CBC with platelets     EKG 12-lead complete w/read - (Clinic Performed)     HCG qualitative urine     XR CHEST 2 VW (Clinic Performed)          Today's Medication Changes          These changes are accurate as of: 4/14/17  9:50 AM.  If you have any questions, ask your nurse or doctor.               Start taking these medicines.        Dose/Directions    * nicotine 21 MG/24HR 24 hr patch   Commonly known as:  NICODERM CQ   Used for:  Tobacco dependence   Started by:  Kaila Henry NP        Dose:  1 patch   Place 1 patch onto the skin every 24 hours   Quantity:  30 patch   Refills:  1       * nicotine 14 MG/24HR 24 hr " patch   Commonly known as:  NICODERM CQ   Used for:  Tobacco dependence   Started by:  Kaila Henry NP        Dose:  1 patch   Place 1 patch onto the skin every 24 hours   Quantity:  30 patch   Refills:  1       * Notice:  This list has 2 medication(s) that are the same as other medications prescribed for you. Read the directions carefully, and ask your doctor or other care provider to review them with you.      Stop taking these medicines if you haven't already. Please contact your care team if you have questions.     albuterol 108 (90 BASE) MCG/ACT Inhaler   Commonly known as:  PROAIR HFA/PROVENTIL HFA/VENTOLIN HFA   Stopped by:  Kaila Henry NP                Where to get your medicines      These medications were sent to [facility] - [place], [place] - [address] [place] [address]  [address] [address]     Phone:  [phone]     nicotine 14 MG/24HR 24 hr patch    nicotine 21 MG/24HR 24 hr patch                Primary Care Provider Office Phone # Fax #    Kaila Henry [phone]       [facility] [address]  [facility] [address]        Thank you!     Thank you for choosing [facility]  for your care. Our goal is always to provide you with excellent care. Hearing back from our patients is one way we can continue to improve our services. Please take a few minutes to complete the written survey that you may receive in the mail after your visit with us. Thank you!             Your Updated Medication List - Protect others around you: Learn how to safely use, store and throw away your medicines at www.disposemymeds.org.          This list is accurate as of: 4/14/17  9:50 AM.  Always use your most recent med list.                   Brand Name Dispense Instructions for use    ibuprofen 800 MG tablet    ADVIL/MOTRIN    90 tablet    TAKE 1 TABLET BY MOUTH EVERY 8 HOURS AS NEEDED       * nicotine 21 MG/24HR 24 hr patch     NICODERM CQ    30 patch    Place 1 patch onto the skin every 24 hours       * nicotine 14 MG/24HR 24 hr patch    NICODERM CQ    30 patch    Place 1 patch onto the skin every 24 hours       * Notice:  This list has 2 medication(s) that are the same as other medications prescribed for you. Read the directions carefully, and ask your doctor or other care provider to review them with you.

## 2017-04-14 NOTE — PROGRESS NOTES
[facility]  [address]  [place] [address]  [phone]  Dept: [phone]    PRE-OP EVALUATION:  Today's date: 2017    Raine Ayala (: 1970) presents for pre-operative evaluation assessment as requested by Dr. Antoni Trinh.  She requires evaluation and anesthesia risk assessment prior to undergoing surgery/procedure for treatment of menorrhagia   Proposed procedure: vaginal hysterectomy     Date of Surgery/ Procedure: 17  Time of Surgery/ Procedure: to be determined  Hospital/Surgical Facility: [place], [place]  Fax number for surgical facility:   Primary Physician: Kaila Henry  Type of Anesthesia Anticipated: General    Patient has a Health Care Directive or Living Will:  NO    1. NO - Do you have a history of heart attack, stroke, stent, bypass or surgery on an artery in the head, neck, heart or legs?  2. NO - Do you ever have any pain or discomfort in your chest?  3. NO - Do you have a history of  Heart Failure?  4. NO - Are you troubled by shortness of breath when: walking on the level, up a slight hill or at night?  5. NO - Do you currently have a cold, bronchitis or other respiratory infection?  6. NO - Do you have a cough, shortness of breath or wheezing?  7. NO - Do you sometimes get pains in the calves of your legs when you walk?  8. NO - Do you or anyone in your family have previous history of blood clots?  9. NO - Do you or does anyone in your family have a serious bleeding problem such as prolonged bleeding following surgeries or cuts?  10. NO - Have you ever had problems with anemia or been told to take iron pills?  11. YES - HAVE YOU HAD ANY ABNORMAL BLOOD LOSS SUCH AS BLACK, TARRY OR BLOODY STOOLS, OR ABNORMAL VAGINAL BLEEDING? Irregular periods with abnormal vaginal periods that is interfering with daily activities and limiting clothing choices.    12. NO - Have you ever had a blood transfusion?  13.  NO - Have you or any of your relatives ever had problems with anesthesia?  14. NO - Do you have sleep apnea, excessive snoring or daytime drowsiness?  15. NO - Do you have any prosthetic heart valves?  16. NO - Do you have prosthetic joints?  17. NO - Is there any chance that you may be pregnant?      HPI:                                                      Brief HPI related to upcoming procedure: heavy irregular periods.  Failed mirena.  The decision has been made to proceed with surgical correction.        She is a smoker, but is otherwise healthy and has no acute concerns today.      MEDICAL HISTORY:                                                      Patient Active Problem List    Diagnosis Date Noted     Uterine cramping 2017     Priority: Medium     Excessive uterine cramping with IUD       Encounter for IUD insertion 2017     Priority: Medium     Mirena on 17       Excessive or frequent menstruation 2017     Priority: Medium     Bleeding for 21 days.  EMB 17       ACP (advance care planning) 06/15/2016     Priority: Medium     Advance Care Planning 6/15/2016: ACP Review of Chart / Resources Provided:  Reviewed chart for advance care plan.  Raine Ayala has no plan or code status on file. Discussed available resources and provided with information. Confirmed code status reflects current choices pending further ACP discussions.  Confirmed/documented legally designated decision makers.  Added by CHUCKY CARR              Past Medical History:   Diagnosis Date     Lumbago 2013     Past Surgical History:   Procedure Laterality Date      SECTION       COLONOSCOPY      Recall colonoscopy 5 years     COLONOSCOPY      Recall colonoscopy 5 years     colonoscopy with polypectomy repeat 2 years         1995     wisdom teeth extraction       Current Outpatient Prescriptions   Medication Sig Dispense Refill     nicotine (NICODERM CQ) 21 MG/24HR 24 hr  "patch Place 1 patch onto the skin every 24 hours 30 patch 1     nicotine (NICODERM CQ) 14 MG/24HR 24 hr patch Place 1 patch onto the skin every 24 hours 30 patch 1     ibuprofen (ADVIL,MOTRIN) 800 MG tablet TAKE 1 TABLET BY MOUTH EVERY 8 HOURS AS NEEDED 90 tablet 0     [DISCONTINUED] NO ACTIVE MEDICATIONS        OTC products: None, except as noted above, no recent use of OTC ASA, NSAIDS or Steroids and no use of herbal medications or other supplements    Allergies   Allergen Reactions     Codeine Other (See Comments)     \"Out of it\"     Penicillins Other (See Comments)     Can't Breathe      Latex Allergy: NO    Social History   Substance Use Topics     Smoking status: Current Every Day Smoker     Packs/day: 0.50     Years: 20.00     Types: Cigarettes     Smokeless tobacco: Never Used      Comment: Tried to Quit (YES); Passive Exposure (NO); Longest Free (2 years)     Alcohol use Yes      Comment: Occasionally     History   Drug Use No       REVIEW OF SYSTEMS:                                                    C: NEGATIVE for fever, chills, change in weight  I: NEGATIVE for worrisome rashes, moles or lesions  E: NEGATIVE for vision changes or irritation  E/M: NEGATIVE for ear, mouth and throat problems  R: NEGATIVE for significant cough or SOB  CV: NEGATIVE for chest pain, palpitations or peripheral edema  GI: NEGATIVE for nausea, abdominal pain, heartburn, or change in bowel habits  : NEGATIVE for frequency, dysuria, or hematuria.  Positive for irregular, heavy periods   M: NEGATIVE for significant arthralgias or myalgia  N: NEGATIVE for weakness, dizziness or paresthesias  E: NEGATIVE for temperature intolerance, skin/hair changes  H: NEGATIVE for bleeding problems  P: NEGATIVE for changes in mood or affect    EXAM:                                                    /60 (BP Location: Left arm, Patient Position: Chair, Cuff Size: Adult Regular)  Pulse 76  Temp 98  F (36.7  C) (Tympanic)  Resp 14  Ht 5' " "3\" (1.6 m)  Wt 145 lb 6.4 oz (66 kg)  BMI 25.76 kg/m2    GENERAL APPEARANCE: healthy, alert and no distress     HENT: ear canals and TM's normal and nose and mouth without ulcers or lesions     NECK: no adenopathy, no asymmetry, masses, or scars and thyroid normal to palpation     RESP: lungs clear to auscultation - no rales, rhonchi or wheezes     CV: regular rates and rhythm, normal S1 S2, no S3 or S4 and no murmur, click or rub     ABDOMEN:  soft, nontender, no HSM or masses and bowel sounds normal     MS: extremities normal- no gross deformities noted, no evidence of inflammation in joints, FROM in all extremities.     SKIN: no suspicious lesions or rashes     NEURO: Normal strength and tone, sensory exam grossly normal, mentation intact and speech normal     PSYCH: mentation appears normal. and affect normal/bright    DIAGNOSTICS:                                                      Results for orders placed or performed in visit on 04/14/17   XR CHEST 2 VW (Clinic Performed)    Narrative    CHEST TWO VIEWS    HISTORY:  Pre-procedural evaluation.    FINDINGS:  The cardiac silhouette and pulmonary vasculature are within  normal limits.  Lungs are clear.  Bony structures are unremarkable.    IMPRESSION:  NO EVIDENCE OF ACUTE OR ACTIVE DISEASE.  Exam Date: Apr 14, 2017 10:03:00 AM  Author: JASON BROCK  This report is final and null     HCG qualitative urine   Result Value Ref Range    HCG Qual Urine Negative NEG   CBC with platelets   Result Value Ref Range    WBC 5.4 4.0 - 11.0 10e9/L    RBC Count 4.15 3.8 - 5.2 10e12/L    Hemoglobin 14.0 11.7 - 15.7 g/dL    Hematocrit 41.3 35.0 - 47.0 %     78 - 100 fl    MCH 33.7 (H) 26.5 - 33.0 pg    MCHC 33.9 31.5 - 36.5 g/dL    RDW 12.3 10.0 - 15.0 %    Platelet Count 289 150 - 450 10e9/L   Basic metabolic panel   Result Value Ref Range    Sodium 139 133 - 144 mmol/L    Potassium 4.1 3.4 - 5.3 mmol/L    Chloride 105 94 - 109 mmol/L    Carbon Dioxide 23 20 - 32 " mmol/L    Anion Gap 11 3 - 14 mmol/L    Glucose 91 70 - 99 mg/dL    Urea Nitrogen 10 7 - 30 mg/dL    Creatinine 0.66 0.52 - 1.04 mg/dL    GFR Estimate >90  Non  GFR Calc   >60 mL/min/1.7m2    GFR Estimate If Black >90   GFR Calc   >60 mL/min/1.7m2    Calcium 9.1 8.5 - 10.1 mg/dL         Recent Labs   Lab Test  02/21/17   0957  06/15/16   1019  05/15/15   1042   HGB  13.1   --   14.3   PLT  303   --   296   INR  0.91   --    --    NA   --   138  139   POTASSIUM   --   4.1  4.6   CR   --   0.62  0.67           EKG Interpretation:      Interpreted by Kaila Henry    Symptoms at time of EKG: None   Rhythm: Normal sinus   Rate: Normal  Axis: Normal  Ectopy: None  Conduction: Normal  ST Segments/ T Waves: No ST-T wave changes and No acute ischemic changes  Q Waves: None  Comparison to prior: No old EKG available    Clinical Impression: normal EKG        IMPRESSION:                                                    Reason for surgery/procedure: heavy, irregular periods  Diagnosis/reason for consult: anesthesia risk assessment    The proposed surgical procedure is considered INTERMEDIATE risk.    REVISED CARDIAC RISK INDEX  The patient has the following serious cardiovascular risks for perioperative complications such as (MI, PE, VFib and 3  AV Block):  No serious cardiac risks  INTERPRETATION: 0 risks: Class I (very low risk - 0.4% complication rate)    The patient has the following additional risks for perioperative complications:  No identified additional risks      ICD-10-CM    1. Preop general physical exam Z01.818 HCG qualitative urine     CBC with platelets     Basic metabolic panel     EKG 12-lead complete w/read - (Clinic Performed)     XR CHEST 2 VW (Clinic Performed)   2. Tobacco dependence F17.200 nicotine (NICODERM CQ) 21 MG/24HR 24 hr patch     nicotine (NICODERM CQ) 14 MG/24HR 24 hr patch   3. Excessive or frequent menstruation N92.0        RECOMMENDATIONS:                                                         Cardiovascular Risk  Performs 4 METs exercise without symptoms (Climb a flight of stairs and Walk on level ground at 15 minutes per mile (4 miles/hour)) .       Pulmonary Risk  Incentive spirometry post op  Advised smoking cessation.           APPROVAL GIVEN to proceed with proposed procedure, without further diagnostic evaluation       Signed Electronically by: Kaila Henry NP    Copy of this evaluation report is provided to requesting physician.    [place] Preop Guidelines

## 2017-04-14 NOTE — NURSING NOTE
"Chief Complaint   Patient presents with     Pre-Op Exam     Dr Trinh for vaginal hysterectomyon 4/26/17     Smoking Cessation     wants to discuss patch        Initial /60 (BP Location: Left arm, Patient Position: Chair, Cuff Size: Adult Regular)  Pulse 76  Temp 98  F (36.7  C) (Tympanic)  Resp 14  Ht 5' 3\" (1.6 m)  Wt 145 lb 6.4 oz (66 kg)  BMI 25.76 kg/m2 Estimated body mass index is 25.76 kg/(m^2) as calculated from the following:    Height as of this encounter: 5' 3\" (1.6 m).    Weight as of this encounter: 145 lb 6.4 oz (66 kg).  Medication Reconciliation: annie CARR      "

## 2017-04-19 NOTE — H&P (VIEW-ONLY)
[facility]  [address]  [place] [address]  [phone]  Dept: [phone]    PRE-OP EVALUATION:  Today's date: 2017    Raine Ayala (: 1970) presents for pre-operative evaluation assessment as requested by Dr. Antoni Trinh.  She requires evaluation and anesthesia risk assessment prior to undergoing surgery/procedure for treatment of menorrhagia   Proposed procedure: vaginal hysterectomy     Date of Surgery/ Procedure: 17  Time of Surgery/ Procedure: to be determined  Hospital/Surgical Facility: [place], [place]  Fax number for surgical facility:   Primary Physician: Kaila Henry  Type of Anesthesia Anticipated: General    Patient has a Health Care Directive or Living Will:  NO    1. NO - Do you have a history of heart attack, stroke, stent, bypass or surgery on an artery in the head, neck, heart or legs?  2. NO - Do you ever have any pain or discomfort in your chest?  3. NO - Do you have a history of  Heart Failure?  4. NO - Are you troubled by shortness of breath when: walking on the level, up a slight hill or at night?  5. NO - Do you currently have a cold, bronchitis or other respiratory infection?  6. NO - Do you have a cough, shortness of breath or wheezing?  7. NO - Do you sometimes get pains in the calves of your legs when you walk?  8. NO - Do you or anyone in your family have previous history of blood clots?  9. NO - Do you or does anyone in your family have a serious bleeding problem such as prolonged bleeding following surgeries or cuts?  10. NO - Have you ever had problems with anemia or been told to take iron pills?  11. YES - HAVE YOU HAD ANY ABNORMAL BLOOD LOSS SUCH AS BLACK, TARRY OR BLOODY STOOLS, OR ABNORMAL VAGINAL BLEEDING? Irregular periods with abnormal vaginal periods that is interfering with daily activities and limiting clothing choices.    12. NO - Have you ever had a blood transfusion?  13.  NO - Have you or any of your relatives ever had problems with anesthesia?  14. NO - Do you have sleep apnea, excessive snoring or daytime drowsiness?  15. NO - Do you have any prosthetic heart valves?  16. NO - Do you have prosthetic joints?  17. NO - Is there any chance that you may be pregnant?      HPI:                                                      Brief HPI related to upcoming procedure: heavy irregular periods.  Failed mirena.  The decision has been made to proceed with surgical correction.        She is a smoker, but is otherwise healthy and has no acute concerns today.      MEDICAL HISTORY:                                                      Patient Active Problem List    Diagnosis Date Noted     Uterine cramping 2017     Priority: Medium     Excessive uterine cramping with IUD       Encounter for IUD insertion 2017     Priority: Medium     Mirena on 17       Excessive or frequent menstruation 2017     Priority: Medium     Bleeding for 21 days.  EMB 17       ACP (advance care planning) 06/15/2016     Priority: Medium     Advance Care Planning 6/15/2016: ACP Review of Chart / Resources Provided:  Reviewed chart for advance care plan.  Raine Ayala has no plan or code status on file. Discussed available resources and provided with information. Confirmed code status reflects current choices pending further ACP discussions.  Confirmed/documented legally designated decision makers.  Added by CHUCKY CARR              Past Medical History:   Diagnosis Date     Lumbago 2013     Past Surgical History:   Procedure Laterality Date      SECTION       COLONOSCOPY      Recall colonoscopy 5 years     COLONOSCOPY      Recall colonoscopy 5 years     colonoscopy with polypectomy repeat 2 years         1995     wisdom teeth extraction       Current Outpatient Prescriptions   Medication Sig Dispense Refill     nicotine (NICODERM CQ) 21 MG/24HR 24 hr  "patch Place 1 patch onto the skin every 24 hours 30 patch 1     nicotine (NICODERM CQ) 14 MG/24HR 24 hr patch Place 1 patch onto the skin every 24 hours 30 patch 1     ibuprofen (ADVIL,MOTRIN) 800 MG tablet TAKE 1 TABLET BY MOUTH EVERY 8 HOURS AS NEEDED 90 tablet 0     [DISCONTINUED] NO ACTIVE MEDICATIONS        OTC products: None, except as noted above, no recent use of OTC ASA, NSAIDS or Steroids and no use of herbal medications or other supplements    Allergies   Allergen Reactions     Codeine Other (See Comments)     \"Out of it\"     Penicillins Other (See Comments)     Can't Breathe      Latex Allergy: NO    Social History   Substance Use Topics     Smoking status: Current Every Day Smoker     Packs/day: 0.50     Years: 20.00     Types: Cigarettes     Smokeless tobacco: Never Used      Comment: Tried to Quit (YES); Passive Exposure (NO); Longest Free (2 years)     Alcohol use Yes      Comment: Occasionally     History   Drug Use No       REVIEW OF SYSTEMS:                                                    C: NEGATIVE for fever, chills, change in weight  I: NEGATIVE for worrisome rashes, moles or lesions  E: NEGATIVE for vision changes or irritation  E/M: NEGATIVE for ear, mouth and throat problems  R: NEGATIVE for significant cough or SOB  CV: NEGATIVE for chest pain, palpitations or peripheral edema  GI: NEGATIVE for nausea, abdominal pain, heartburn, or change in bowel habits  : NEGATIVE for frequency, dysuria, or hematuria.  Positive for irregular, heavy periods   M: NEGATIVE for significant arthralgias or myalgia  N: NEGATIVE for weakness, dizziness or paresthesias  E: NEGATIVE for temperature intolerance, skin/hair changes  H: NEGATIVE for bleeding problems  P: NEGATIVE for changes in mood or affect    EXAM:                                                    /60 (BP Location: Left arm, Patient Position: Chair, Cuff Size: Adult Regular)  Pulse 76  Temp 98  F (36.7  C) (Tympanic)  Resp 14  Ht 5' " "3\" (1.6 m)  Wt 145 lb 6.4 oz (66 kg)  BMI 25.76 kg/m2    GENERAL APPEARANCE: healthy, alert and no distress     HENT: ear canals and TM's normal and nose and mouth without ulcers or lesions     NECK: no adenopathy, no asymmetry, masses, or scars and thyroid normal to palpation     RESP: lungs clear to auscultation - no rales, rhonchi or wheezes     CV: regular rates and rhythm, normal S1 S2, no S3 or S4 and no murmur, click or rub     ABDOMEN:  soft, nontender, no HSM or masses and bowel sounds normal     MS: extremities normal- no gross deformities noted, no evidence of inflammation in joints, FROM in all extremities.     SKIN: no suspicious lesions or rashes     NEURO: Normal strength and tone, sensory exam grossly normal, mentation intact and speech normal     PSYCH: mentation appears normal. and affect normal/bright    DIAGNOSTICS:                                                      Results for orders placed or performed in visit on 04/14/17   XR CHEST 2 VW (Clinic Performed)    Narrative    CHEST TWO VIEWS    HISTORY:  Pre-procedural evaluation.    FINDINGS:  The cardiac silhouette and pulmonary vasculature are within  normal limits.  Lungs are clear.  Bony structures are unremarkable.    IMPRESSION:  NO EVIDENCE OF ACUTE OR ACTIVE DISEASE.  Exam Date: Apr 14, 2017 10:03:00 AM  Author: JASON BROCK  This report is final and null     HCG qualitative urine   Result Value Ref Range    HCG Qual Urine Negative NEG   CBC with platelets   Result Value Ref Range    WBC 5.4 4.0 - 11.0 10e9/L    RBC Count 4.15 3.8 - 5.2 10e12/L    Hemoglobin 14.0 11.7 - 15.7 g/dL    Hematocrit 41.3 35.0 - 47.0 %     78 - 100 fl    MCH 33.7 (H) 26.5 - 33.0 pg    MCHC 33.9 31.5 - 36.5 g/dL    RDW 12.3 10.0 - 15.0 %    Platelet Count 289 150 - 450 10e9/L   Basic metabolic panel   Result Value Ref Range    Sodium 139 133 - 144 mmol/L    Potassium 4.1 3.4 - 5.3 mmol/L    Chloride 105 94 - 109 mmol/L    Carbon Dioxide 23 20 - 32 " mmol/L    Anion Gap 11 3 - 14 mmol/L    Glucose 91 70 - 99 mg/dL    Urea Nitrogen 10 7 - 30 mg/dL    Creatinine 0.66 0.52 - 1.04 mg/dL    GFR Estimate >90  Non  GFR Calc   >60 mL/min/1.7m2    GFR Estimate If Black >90   GFR Calc   >60 mL/min/1.7m2    Calcium 9.1 8.5 - 10.1 mg/dL         Recent Labs   Lab Test  02/21/17   0957  06/15/16   1019  05/15/15   1042   HGB  13.1   --   14.3   PLT  303   --   296   INR  0.91   --    --    NA   --   138  139   POTASSIUM   --   4.1  4.6   CR   --   0.62  0.67           EKG Interpretation:      Interpreted by Kaila Henry    Symptoms at time of EKG: None   Rhythm: Normal sinus   Rate: Normal  Axis: Normal  Ectopy: None  Conduction: Normal  ST Segments/ T Waves: No ST-T wave changes and No acute ischemic changes  Q Waves: None  Comparison to prior: No old EKG available    Clinical Impression: normal EKG        IMPRESSION:                                                    Reason for surgery/procedure: heavy, irregular periods  Diagnosis/reason for consult: anesthesia risk assessment    The proposed surgical procedure is considered INTERMEDIATE risk.    REVISED CARDIAC RISK INDEX  The patient has the following serious cardiovascular risks for perioperative complications such as (MI, PE, VFib and 3  AV Block):  No serious cardiac risks  INTERPRETATION: 0 risks: Class I (very low risk - 0.4% complication rate)    The patient has the following additional risks for perioperative complications:  No identified additional risks      ICD-10-CM    1. Preop general physical exam Z01.818 HCG qualitative urine     CBC with platelets     Basic metabolic panel     EKG 12-lead complete w/read - (Clinic Performed)     XR CHEST 2 VW (Clinic Performed)   2. Tobacco dependence F17.200 nicotine (NICODERM CQ) 21 MG/24HR 24 hr patch     nicotine (NICODERM CQ) 14 MG/24HR 24 hr patch   3. Excessive or frequent menstruation N92.0        RECOMMENDATIONS:                                                         Cardiovascular Risk  Performs 4 METs exercise without symptoms (Climb a flight of stairs and Walk on level ground at 15 minutes per mile (4 miles/hour)) .       Pulmonary Risk  Incentive spirometry post op  Advised smoking cessation.           APPROVAL GIVEN to proceed with proposed procedure, without further diagnostic evaluation       Signed Electronically by: Kaila Henry NP    Copy of this evaluation report is provided to requesting physician.    [place] Preop Guidelines

## 2017-04-26 ENCOUNTER — ANESTHESIA (OUTPATIENT)
Dept: SURGERY | Facility: HOSPITAL | Age: 47
End: 2017-04-26
Payer: COMMERCIAL

## 2017-04-26 ENCOUNTER — SURGERY (OUTPATIENT)
Age: 47
End: 2017-04-26

## 2017-04-26 ENCOUNTER — HOSPITAL ENCOUNTER (OUTPATIENT)
Facility: HOSPITAL | Age: 47
Discharge: HOME | End: 2017-04-27
Attending: OBSTETRICS & GYNECOLOGY | Admitting: OBSTETRICS & GYNECOLOGY
Payer: COMMERCIAL

## 2017-04-26 ENCOUNTER — ANESTHESIA EVENT (OUTPATIENT)
Dept: SURGERY | Facility: HOSPITAL | Age: 47
End: 2017-04-26
Payer: COMMERCIAL

## 2017-04-26 DIAGNOSIS — G89.18 ACUTE POST-OPERATIVE PAIN: Primary | ICD-10-CM

## 2017-04-26 PROBLEM — Z41.9 SURGERY, ELECTIVE: Status: ACTIVE | Noted: 2017-04-26

## 2017-04-26 LAB
ABO + RH BLD: NORMAL
ABO + RH BLD: NORMAL
BLD GP AB INVEST PLASRBC-IMP: NORMAL
BLD GP AB SCN SERPL QL: NORMAL
BLOOD BANK CMNT PATIENT-IMP: NORMAL
HCG SERPL QL: NEGATIVE
HGB BLD-MCNC: 11.9 G/DL (ref 11.7–15.7)
SPECIMEN EXP DATE BLD: NORMAL

## 2017-04-26 PROCEDURE — 25000128 H RX IP 250 OP 636: Performed by: OBSTETRICS & GYNECOLOGY

## 2017-04-26 PROCEDURE — 86850 RBC ANTIBODY SCREEN: CPT | Performed by: OBSTETRICS & GYNECOLOGY

## 2017-04-26 PROCEDURE — 25800025 ZZH RX 258: Performed by: ANESTHESIOLOGY

## 2017-04-26 PROCEDURE — 36000058 ZZH SURGERY LEVEL 3 EA 15 ADDTL MIN: Performed by: OBSTETRICS & GYNECOLOGY

## 2017-04-26 PROCEDURE — 84703 CHORIONIC GONADOTROPIN ASSAY: CPT | Performed by: OBSTETRICS & GYNECOLOGY

## 2017-04-26 PROCEDURE — 25000128 H RX IP 250 OP 636

## 2017-04-26 PROCEDURE — 25000125 ZZHC RX 250

## 2017-04-26 PROCEDURE — 71000015 ZZH RECOVERY PHASE 1 LEVEL 2 EA ADDTL HR: Performed by: OBSTETRICS & GYNECOLOGY

## 2017-04-26 PROCEDURE — 01999 UNLISTED ANES PROCEDURE: CPT | Performed by: NURSE ANESTHETIST, CERTIFIED REGISTERED

## 2017-04-26 PROCEDURE — 25000125 ZZHC RX 250: Performed by: NURSE ANESTHETIST, CERTIFIED REGISTERED

## 2017-04-26 PROCEDURE — 25000128 H RX IP 250 OP 636: Performed by: NURSE ANESTHETIST, CERTIFIED REGISTERED

## 2017-04-26 PROCEDURE — 37000008 ZZH ANESTHESIA TECHNICAL FEE, 1ST 30 MIN: Performed by: OBSTETRICS & GYNECOLOGY

## 2017-04-26 PROCEDURE — 25000125 ZZHC RX 250: Performed by: OBSTETRICS & GYNECOLOGY

## 2017-04-26 PROCEDURE — 36416 COLLJ CAPILLARY BLOOD SPEC: CPT | Performed by: OBSTETRICS & GYNECOLOGY

## 2017-04-26 PROCEDURE — 37000009 ZZH ANESTHESIA TECHNICAL FEE, EACH ADDTL 15 MIN: Performed by: OBSTETRICS & GYNECOLOGY

## 2017-04-26 PROCEDURE — 40000305 ZZH STATISTIC PRE PROC ASSESS I: Performed by: OBSTETRICS & GYNECOLOGY

## 2017-04-26 PROCEDURE — 85018 HEMOGLOBIN: CPT | Performed by: OBSTETRICS & GYNECOLOGY

## 2017-04-26 PROCEDURE — 58260 VAGINAL HYSTERECTOMY: CPT | Mod: AS | Performed by: NURSE PRACTITIONER

## 2017-04-26 PROCEDURE — 27110028 ZZH OR GENERAL SUPPLY NON-STERILE: Performed by: OBSTETRICS & GYNECOLOGY

## 2017-04-26 PROCEDURE — 71000014 ZZH RECOVERY PHASE 1 LEVEL 2 FIRST HR: Performed by: OBSTETRICS & GYNECOLOGY

## 2017-04-26 PROCEDURE — 25000132 ZZH RX MED GY IP 250 OP 250 PS 637: Performed by: OBSTETRICS & GYNECOLOGY

## 2017-04-26 PROCEDURE — 58552 LAPARO-VAG HYST INCL T/O: CPT | Mod: QK | Performed by: ANESTHESIOLOGY

## 2017-04-26 PROCEDURE — 88307 TISSUE EXAM BY PATHOLOGIST: CPT | Mod: TC | Performed by: OBSTETRICS & GYNECOLOGY

## 2017-04-26 PROCEDURE — 27210794 ZZH OR GENERAL SUPPLY STERILE: Performed by: OBSTETRICS & GYNECOLOGY

## 2017-04-26 PROCEDURE — 36415 COLL VENOUS BLD VENIPUNCTURE: CPT | Performed by: OBSTETRICS & GYNECOLOGY

## 2017-04-26 PROCEDURE — 25800025 ZZH RX 258: Performed by: OBSTETRICS & GYNECOLOGY

## 2017-04-26 PROCEDURE — 36000056 ZZH SURGERY LEVEL 3 1ST 30 MIN: Performed by: OBSTETRICS & GYNECOLOGY

## 2017-04-26 PROCEDURE — 25000128 H RX IP 250 OP 636: Performed by: ANESTHESIOLOGY

## 2017-04-26 PROCEDURE — 25000125 ZZHC RX 250: Performed by: ANESTHESIOLOGY

## 2017-04-26 PROCEDURE — 86900 BLOOD TYPING SEROLOGIC ABO: CPT | Performed by: OBSTETRICS & GYNECOLOGY

## 2017-04-26 PROCEDURE — 86901 BLOOD TYPING SEROLOGIC RH(D): CPT | Performed by: OBSTETRICS & GYNECOLOGY

## 2017-04-26 PROCEDURE — 58260 VAGINAL HYSTERECTOMY: CPT | Performed by: OBSTETRICS & GYNECOLOGY

## 2017-04-26 RX ORDER — CALCIUM CARBONATE 500 MG/1
500-1000 TABLET, CHEWABLE ORAL 4 TIMES DAILY PRN
Status: DISCONTINUED | OUTPATIENT
Start: 2017-04-26 | End: 2017-04-27 | Stop reason: HOSPADM

## 2017-04-26 RX ORDER — HYDROCODONE BITARTRATE AND ACETAMINOPHEN 5; 325 MG/1; MG/1
1-2 TABLET ORAL EVERY 4 HOURS PRN
Status: DISCONTINUED | OUTPATIENT
Start: 2017-04-26 | End: 2017-04-27 | Stop reason: HOSPADM

## 2017-04-26 RX ORDER — DEXAMETHASONE SODIUM PHOSPHATE 10 MG/ML
INJECTION, SOLUTION INTRAMUSCULAR; INTRAVENOUS PRN
Status: DISCONTINUED | OUTPATIENT
Start: 2017-04-26 | End: 2017-04-26

## 2017-04-26 RX ORDER — IBUPROFEN 800 MG/1
800 TABLET, FILM COATED ORAL 3 TIMES DAILY PRN
Status: DISCONTINUED | OUTPATIENT
Start: 2017-04-26 | End: 2017-04-27 | Stop reason: HOSPADM

## 2017-04-26 RX ORDER — HYDROMORPHONE HYDROCHLORIDE 1 MG/ML
.3-.5 INJECTION, SOLUTION INTRAMUSCULAR; INTRAVENOUS; SUBCUTANEOUS
Status: DISCONTINUED | OUTPATIENT
Start: 2017-04-26 | End: 2017-04-27 | Stop reason: HOSPADM

## 2017-04-26 RX ORDER — ONDANSETRON 2 MG/ML
4 INJECTION INTRAMUSCULAR; INTRAVENOUS EVERY 6 HOURS PRN
Status: DISCONTINUED | OUTPATIENT
Start: 2017-04-26 | End: 2017-04-27 | Stop reason: HOSPADM

## 2017-04-26 RX ORDER — NALOXONE HYDROCHLORIDE 0.4 MG/ML
.1-.4 INJECTION, SOLUTION INTRAMUSCULAR; INTRAVENOUS; SUBCUTANEOUS
Status: DISCONTINUED | OUTPATIENT
Start: 2017-04-26 | End: 2017-04-26 | Stop reason: HOSPADM

## 2017-04-26 RX ORDER — DEXAMETHASONE SODIUM PHOSPHATE 4 MG/ML
4 INJECTION, SOLUTION INTRA-ARTICULAR; INTRALESIONAL; INTRAMUSCULAR; INTRAVENOUS; SOFT TISSUE EVERY 10 MIN PRN
Status: DISCONTINUED | OUTPATIENT
Start: 2017-04-26 | End: 2017-04-26 | Stop reason: HOSPADM

## 2017-04-26 RX ORDER — METOCLOPRAMIDE HYDROCHLORIDE 5 MG/ML
10 INJECTION INTRAMUSCULAR; INTRAVENOUS EVERY 6 HOURS PRN
Status: DISCONTINUED | OUTPATIENT
Start: 2017-04-26 | End: 2017-04-27 | Stop reason: HOSPADM

## 2017-04-26 RX ORDER — ONDANSETRON 2 MG/ML
4 INJECTION INTRAMUSCULAR; INTRAVENOUS EVERY 30 MIN PRN
Status: DISCONTINUED | OUTPATIENT
Start: 2017-04-26 | End: 2017-04-26 | Stop reason: HOSPADM

## 2017-04-26 RX ORDER — ONDANSETRON 4 MG/1
4 TABLET, ORALLY DISINTEGRATING ORAL EVERY 30 MIN PRN
Status: DISCONTINUED | OUTPATIENT
Start: 2017-04-26 | End: 2017-04-26 | Stop reason: HOSPADM

## 2017-04-26 RX ORDER — FENTANYL CITRATE 50 UG/ML
25-50 INJECTION, SOLUTION INTRAMUSCULAR; INTRAVENOUS
Status: DISCONTINUED | OUTPATIENT
Start: 2017-04-26 | End: 2017-04-26 | Stop reason: HOSPADM

## 2017-04-26 RX ORDER — HYDROMORPHONE HYDROCHLORIDE 1 MG/ML
.3-.5 INJECTION, SOLUTION INTRAMUSCULAR; INTRAVENOUS; SUBCUTANEOUS EVERY 10 MIN PRN
Status: DISCONTINUED | OUTPATIENT
Start: 2017-04-26 | End: 2017-04-26 | Stop reason: HOSPADM

## 2017-04-26 RX ORDER — PHENAZOPYRIDINE HYDROCHLORIDE 100 MG/1
200 TABLET, FILM COATED ORAL ONCE
Status: COMPLETED | OUTPATIENT
Start: 2017-04-26 | End: 2017-04-26

## 2017-04-26 RX ORDER — NALOXONE HYDROCHLORIDE 0.4 MG/ML
.1-.4 INJECTION, SOLUTION INTRAMUSCULAR; INTRAVENOUS; SUBCUTANEOUS
Status: DISCONTINUED | OUTPATIENT
Start: 2017-04-26 | End: 2017-04-27 | Stop reason: HOSPADM

## 2017-04-26 RX ORDER — CIPROFLOXACIN 2 MG/ML
400 INJECTION, SOLUTION INTRAVENOUS
Status: COMPLETED | OUTPATIENT
Start: 2017-04-26 | End: 2017-04-26

## 2017-04-26 RX ORDER — HYDROXYZINE HYDROCHLORIDE 50 MG/ML
100 INJECTION, SOLUTION INTRAMUSCULAR EVERY 6 HOURS PRN
Status: DISCONTINUED | OUTPATIENT
Start: 2017-04-26 | End: 2017-04-27 | Stop reason: HOSPADM

## 2017-04-26 RX ORDER — ONDANSETRON 2 MG/ML
INJECTION INTRAMUSCULAR; INTRAVENOUS PRN
Status: DISCONTINUED | OUTPATIENT
Start: 2017-04-26 | End: 2017-04-26

## 2017-04-26 RX ORDER — SODIUM CHLORIDE, SODIUM LACTATE, POTASSIUM CHLORIDE, CALCIUM CHLORIDE 600; 310; 30; 20 MG/100ML; MG/100ML; MG/100ML; MG/100ML
INJECTION, SOLUTION INTRAVENOUS CONTINUOUS
Status: DISCONTINUED | OUTPATIENT
Start: 2017-04-26 | End: 2017-04-27 | Stop reason: HOSPADM

## 2017-04-26 RX ORDER — HYDRALAZINE HYDROCHLORIDE 20 MG/ML
2.5-5 INJECTION INTRAMUSCULAR; INTRAVENOUS EVERY 10 MIN PRN
Status: DISCONTINUED | OUTPATIENT
Start: 2017-04-26 | End: 2017-04-26 | Stop reason: HOSPADM

## 2017-04-26 RX ORDER — FENTANYL CITRATE 50 UG/ML
INJECTION, SOLUTION INTRAMUSCULAR; INTRAVENOUS
Status: COMPLETED
Start: 2017-04-26 | End: 2017-04-26

## 2017-04-26 RX ORDER — ONDANSETRON 2 MG/ML
INJECTION INTRAMUSCULAR; INTRAVENOUS
Status: COMPLETED
Start: 2017-04-26 | End: 2017-04-26

## 2017-04-26 RX ORDER — PROMETHAZINE HYDROCHLORIDE 25 MG/ML
12.5 INJECTION, SOLUTION INTRAMUSCULAR; INTRAVENOUS
Status: DISCONTINUED | OUTPATIENT
Start: 2017-04-26 | End: 2017-04-26 | Stop reason: HOSPADM

## 2017-04-26 RX ORDER — KETOROLAC TROMETHAMINE 30 MG/ML
30 INJECTION, SOLUTION INTRAMUSCULAR; INTRAVENOUS EVERY 6 HOURS
Status: DISPENSED | OUTPATIENT
Start: 2017-04-26 | End: 2017-04-27

## 2017-04-26 RX ORDER — SODIUM CHLORIDE, SODIUM LACTATE, POTASSIUM CHLORIDE, CALCIUM CHLORIDE 600; 310; 30; 20 MG/100ML; MG/100ML; MG/100ML; MG/100ML
INJECTION, SOLUTION INTRAVENOUS CONTINUOUS
Status: DISCONTINUED | OUTPATIENT
Start: 2017-04-26 | End: 2017-04-26 | Stop reason: HOSPADM

## 2017-04-26 RX ORDER — SCOLOPAMINE TRANSDERMAL SYSTEM 1 MG/1
1 PATCH, EXTENDED RELEASE TRANSDERMAL ONCE
Status: COMPLETED | OUTPATIENT
Start: 2017-04-26 | End: 2017-04-26

## 2017-04-26 RX ORDER — NICOTINE 21 MG/24HR
1 PATCH, TRANSDERMAL 24 HOURS TRANSDERMAL EVERY 24 HOURS
Status: DISCONTINUED | OUTPATIENT
Start: 2017-04-26 | End: 2017-04-27 | Stop reason: HOSPADM

## 2017-04-26 RX ORDER — NEOSTIGMINE METHYLSULFATE 1 MG/ML
VIAL (ML) INJECTION PRN
Status: DISCONTINUED | OUTPATIENT
Start: 2017-04-26 | End: 2017-04-26

## 2017-04-26 RX ORDER — MEPERIDINE HYDROCHLORIDE 25 MG/ML
12.5 INJECTION INTRAMUSCULAR; INTRAVENOUS; SUBCUTANEOUS
Status: DISCONTINUED | OUTPATIENT
Start: 2017-04-26 | End: 2017-04-26 | Stop reason: HOSPADM

## 2017-04-26 RX ORDER — PROCHLORPERAZINE MALEATE 5 MG
5-10 TABLET ORAL EVERY 6 HOURS PRN
Status: DISCONTINUED | OUTPATIENT
Start: 2017-04-26 | End: 2017-04-27 | Stop reason: HOSPADM

## 2017-04-26 RX ORDER — ALBUTEROL SULFATE 0.83 MG/ML
2.5 SOLUTION RESPIRATORY (INHALATION) EVERY 4 HOURS PRN
Status: DISCONTINUED | OUTPATIENT
Start: 2017-04-26 | End: 2017-04-26 | Stop reason: HOSPADM

## 2017-04-26 RX ORDER — KETOROLAC TROMETHAMINE 30 MG/ML
30 INJECTION, SOLUTION INTRAMUSCULAR; INTRAVENOUS EVERY 6 HOURS PRN
Status: DISCONTINUED | OUTPATIENT
Start: 2017-04-26 | End: 2017-04-26 | Stop reason: HOSPADM

## 2017-04-26 RX ORDER — CIPROFLOXACIN 2 MG/ML
400 INJECTION, SOLUTION INTRAVENOUS SEE ADMIN INSTRUCTIONS
Status: DISCONTINUED | OUTPATIENT
Start: 2017-04-26 | End: 2017-04-26 | Stop reason: HOSPADM

## 2017-04-26 RX ORDER — PROPOFOL 10 MG/ML
INJECTION, EMULSION INTRAVENOUS PRN
Status: DISCONTINUED | OUTPATIENT
Start: 2017-04-26 | End: 2017-04-26

## 2017-04-26 RX ORDER — LABETALOL HYDROCHLORIDE 5 MG/ML
10 INJECTION, SOLUTION INTRAVENOUS
Status: DISCONTINUED | OUTPATIENT
Start: 2017-04-26 | End: 2017-04-26 | Stop reason: HOSPADM

## 2017-04-26 RX ORDER — ONDANSETRON 4 MG/1
4 TABLET, ORALLY DISINTEGRATING ORAL EVERY 6 HOURS PRN
Status: DISCONTINUED | OUTPATIENT
Start: 2017-04-26 | End: 2017-04-27 | Stop reason: HOSPADM

## 2017-04-26 RX ORDER — LIDOCAINE HYDROCHLORIDE 20 MG/ML
INJECTION, SOLUTION INFILTRATION; PERINEURAL PRN
Status: DISCONTINUED | OUTPATIENT
Start: 2017-04-26 | End: 2017-04-26

## 2017-04-26 RX ORDER — GLYCOPYRROLATE 0.2 MG/ML
INJECTION, SOLUTION INTRAMUSCULAR; INTRAVENOUS PRN
Status: DISCONTINUED | OUTPATIENT
Start: 2017-04-26 | End: 2017-04-26

## 2017-04-26 RX ORDER — METOCLOPRAMIDE 10 MG/1
10 TABLET ORAL EVERY 6 HOURS PRN
Status: DISCONTINUED | OUTPATIENT
Start: 2017-04-26 | End: 2017-04-27 | Stop reason: HOSPADM

## 2017-04-26 RX ORDER — FENTANYL CITRATE 50 UG/ML
INJECTION, SOLUTION INTRAMUSCULAR; INTRAVENOUS PRN
Status: DISCONTINUED | OUTPATIENT
Start: 2017-04-26 | End: 2017-04-26

## 2017-04-26 RX ADMIN — LIDOCAINE HYDROCHLORIDE 10 ML: 10; .005 INJECTION, SOLUTION EPIDURAL; INFILTRATION; INTRACAUDAL; PERINEURAL at 11:25

## 2017-04-26 RX ADMIN — TRANEXAMIC ACID 1 G: 100 INJECTION, SOLUTION INTRAVENOUS at 11:23

## 2017-04-26 RX ADMIN — FENTANYL CITRATE 50 MCG: 50 INJECTION, SOLUTION INTRAMUSCULAR; INTRAVENOUS at 12:18

## 2017-04-26 RX ADMIN — HYDROMORPHONE HYDROCHLORIDE 1 MG: 1 INJECTION, SOLUTION INTRAMUSCULAR; INTRAVENOUS; SUBCUTANEOUS at 11:03

## 2017-04-26 RX ADMIN — KETOROLAC TROMETHAMINE 30 MG: 30 INJECTION, SOLUTION INTRAMUSCULAR; INTRAVENOUS at 19:37

## 2017-04-26 RX ADMIN — LIDOCAINE HYDROCHLORIDE 40 MG: 20 INJECTION, SOLUTION INFILTRATION; PERINEURAL at 10:33

## 2017-04-26 RX ADMIN — SODIUM CHLORIDE, POTASSIUM CHLORIDE, SODIUM LACTATE AND CALCIUM CHLORIDE: 600; 310; 30; 20 INJECTION, SOLUTION INTRAVENOUS at 13:52

## 2017-04-26 RX ADMIN — NEOSTIGMINE METHYLSULFATE 2 MG: 1 INJECTION INTRAMUSCULAR; INTRAVENOUS; SUBCUTANEOUS at 11:44

## 2017-04-26 RX ADMIN — ROCURONIUM BROMIDE 50 MG: 10 INJECTION INTRAVENOUS at 10:33

## 2017-04-26 RX ADMIN — ONDANSETRON 4 MG: 2 INJECTION INTRAMUSCULAR; INTRAVENOUS at 13:34

## 2017-04-26 RX ADMIN — GLYCOPYRROLATE 0.4 MG: 0.2 INJECTION, SOLUTION INTRAMUSCULAR; INTRAVENOUS at 11:44

## 2017-04-26 RX ADMIN — FENTANYL CITRATE 50 MCG: 50 INJECTION, SOLUTION INTRAMUSCULAR; INTRAVENOUS at 12:04

## 2017-04-26 RX ADMIN — PHENAZOPYRIDINE HYDROCHLORIDE 200 MG: 100 TABLET ORAL at 09:48

## 2017-04-26 RX ADMIN — CIPROFLOXACIN 400 MG: 2 INJECTION INTRAVENOUS at 10:48

## 2017-04-26 RX ADMIN — HYDROMORPHONE HYDROCHLORIDE 0.3 MG: 1 INJECTION, SOLUTION INTRAMUSCULAR; INTRAVENOUS; SUBCUTANEOUS at 15:50

## 2017-04-26 RX ADMIN — SODIUM CHLORIDE 500 ML: 9 INJECTION, SOLUTION INTRAVENOUS at 13:51

## 2017-04-26 RX ADMIN — SCOPALAMINE 1 PATCH: 1 PATCH, EXTENDED RELEASE TRANSDERMAL at 09:49

## 2017-04-26 RX ADMIN — ONDANSETRON 4 MG: 2 INJECTION INTRAMUSCULAR; INTRAVENOUS at 11:30

## 2017-04-26 RX ADMIN — HYDROCODONE BITARTRATE AND ACETAMINOPHEN 1 TABLET: 5; 325 TABLET ORAL at 22:02

## 2017-04-26 RX ADMIN — KETOROLAC TROMETHAMINE 30 MG: 30 INJECTION, SOLUTION INTRAMUSCULAR; INTRAVENOUS at 12:28

## 2017-04-26 RX ADMIN — PROPOFOL 200 MG: 10 INJECTION, EMULSION INTRAVENOUS at 10:33

## 2017-04-26 RX ADMIN — HYDROMORPHONE HYDROCHLORIDE 0.2 MG: 1 INJECTION, SOLUTION INTRAMUSCULAR; INTRAVENOUS; SUBCUTANEOUS at 16:03

## 2017-04-26 RX ADMIN — METRONIDAZOLE 500 MG: 500 INJECTION, SOLUTION INTRAVENOUS at 10:27

## 2017-04-26 RX ADMIN — DEXAMETHASONE SODIUM PHOSPHATE 10 MG: 10 INJECTION, SOLUTION INTRAMUSCULAR; INTRAVENOUS at 11:43

## 2017-04-26 RX ADMIN — SODIUM CHLORIDE, POTASSIUM CHLORIDE, SODIUM LACTATE AND CALCIUM CHLORIDE: 600; 310; 30; 20 INJECTION, SOLUTION INTRAVENOUS at 11:33

## 2017-04-26 RX ADMIN — FENTANYL CITRATE 100 MCG: 50 INJECTION, SOLUTION INTRAMUSCULAR; INTRAVENOUS at 10:31

## 2017-04-26 RX ADMIN — SODIUM CHLORIDE, POTASSIUM CHLORIDE, SODIUM LACTATE AND CALCIUM CHLORIDE: 600; 310; 30; 20 INJECTION, SOLUTION INTRAVENOUS at 09:44

## 2017-04-26 RX ADMIN — HYDROMORPHONE HYDROCHLORIDE 0.5 MG: 1 INJECTION, SOLUTION INTRAMUSCULAR; INTRAVENOUS; SUBCUTANEOUS at 18:08

## 2017-04-26 RX ADMIN — HYDROMORPHONE HYDROCHLORIDE 0.5 MG: 1 INJECTION, SOLUTION INTRAMUSCULAR; INTRAVENOUS; SUBCUTANEOUS at 12:51

## 2017-04-26 ASSESSMENT — PAIN DESCRIPTION - DESCRIPTORS
DESCRIPTORS: CRAMPING

## 2017-04-26 ASSESSMENT — LIFESTYLE VARIABLES: TOBACCO_USE: 1

## 2017-04-26 ASSESSMENT — COPD QUESTIONNAIRES
COPD: 1
CAT_SEVERITY: MILD

## 2017-04-26 NOTE — PLAN OF CARE
BP's 80's/40's on admission to the floor. LR running at 100 cc/hr. Dr. Ziggy anna. Ordered to give Bolus of  cc and monitor urine output.

## 2017-04-26 NOTE — PROVIDER NOTIFICATION
Ziggy Khalil updated on pts urine output of 350 mls in the last hour and of pts /67. Will continue to monitor output and VS per post op protocol.

## 2017-04-26 NOTE — PLAN OF CARE
Dr. Trinh in to see patient. BP's slowly improving last check 94/52. 500 cc bolus completed. Urine output since admission to the floor 25 ml. MD aware. Dr. Trinh would like IV fluids increased to 150 cc/hr and would like nursing to monitor urine output over the next hour and call him with an updated in 1 hour (approximately 1600).

## 2017-04-26 NOTE — IP AVS SNAPSHOT
[facility]    [address] [address]    Phone:  [phone]    Fax:  [phone]                                       After Visit Summary   4/26/2017    Raine Ayala    MRN: [id number]           After Visit Summary Signature Page     I have received my discharge instructions, and my questions have been answered. I have discussed any challenges I see with this plan with the nurse or doctor.    ..........................................................................................................................................  Patient/Patient Representative Signature      ..........................................................................................................................................  Patient Representative Print Name and Relationship to Patient    ..................................................               ................................................  Date                                            Time    ..........................................................................................................................................  Reviewed by Signature/Title    ...................................................              ..............................................  Date                                                            Time

## 2017-04-26 NOTE — PLAN OF CARE
[facility] Inpatient Admission Note:    Patient admitted to 3218/3218-1 at approximately 1310 via cart accompanied by significant other, daughter and mother from surgery . Report received from Jorge RN in SBAR format at 1310 via face to face in room. Patient transferred to bed via self.. Patient is alert and oriented X 3, reports pain; rates at 6 on 0-10 scale.  Patient oriented to room, unit, hourly rounding, and plan of care. Explained admission packet and patient handbook with patient bill of rights brochure. Will continue to monitor and document as needed.     Inpatient Nursing criteria listed below was met:      Health care directives status obtained and documented: Yes      Care Everywhere authorization obtained No      MRSA swab completed for patient 65 years and older: No      Patient identifies a surrogate decision maker: Yes If yes, who:Juan Manuel Fernandez-significant other Contact Information:see facesheet      Core Measure diagnosis present:No. If yes, state diagnosis: Hysterectomy        If initial lactic acid >2.0, repeat lactic acid drawn within one hour of arrival to unit: NA. If no, state reason: surgical      Vaccination assessment and education completed: Yes   Vaccinations received prior to admission: Pneumovax no  Influenza(seasonal)  YES   Vaccination(s) ordered: patient declines      Clergy visit ordered if patient requests: No      Skin issues/needs documented: No      Isolation Patient: no Education given, correct sign in place and documentation row added to PCS:  No      Fall Prevention Yes: Care plan updated, education given and documented, sticker and magnet in place: Yes      Care Plan initiated: Yes      Education Documented (including assessment): Yes      Patient has discharge needs : No If yes, please explain:N/A

## 2017-04-26 NOTE — INTERVAL H&P NOTE
History and physical reviewed on 4/26/2017.  Patient examined. No interval change in condition.    Antoni Trinh MD  9:31 AM

## 2017-04-26 NOTE — PLAN OF CARE
Problem: Goal Outcome Summary  Goal: Goal Outcome Summary  Outcome: No Change  Patient admitted to the floor at 1310 from surgery. A&O. Afebrile. Hypotensive, Dr. Trinh paged and 500 cc bolus of NS given. LR running at 150 cc/hr. See previous notes. BP's have improved to 90's/50's after bolus. HR regular. Lungs dim/clear. Pain rated 6/10 cramping across abdomin. Nausea on admission, Zofran given with relief. Bowels hypoactive, not passing flatus. No blood/drainage noted on sherie pad. PCD's on bilateral lower extremities. Tolerating clear liquids. Catheter patent with 25 ml orange output since admission. Dr. Trinh aware. Dr. Trinh would like oncoming nurse to call him and update him on urine output at approximately 1600. Patient's weight still needs to be obtained.      Face to face report given with opportunity to observe patient.     Report given to Renetta Sanabria   4/26/2017  3:26 PM

## 2017-04-26 NOTE — PLAN OF CARE
Face to face report given with opportunity to observe patient.  Report given to Portia BUTCHER RN.    Renetta Carlisle  4/26/2017, 6:56 PM

## 2017-04-26 NOTE — ANESTHESIA PREPROCEDURE EVALUATION
Anesthesia Evaluation     . Pt has had prior anesthetic.     No history of anesthetic complications          ROS/MED HX    ENT/Pulmonary:     (+)tobacco use, Current use 0.5 PPD; quit 2 days ago packs/day  mild COPD, , . .    Neurologic:  - neg neurologic ROS     Cardiovascular:  - neg cardiovascular ROS   (+) ----. : . . . :. . Previous cardiac testing date:results:date: results:ECG reviewed date:4/14/2017 results:SB@56, OWN date: results:          METS/Exercise Tolerance:  >4 METS   Hematologic:  - neg hematologic  ROS       Musculoskeletal:   (+) , , other musculoskeletal- Chronic LBP      GI/Hepatic:  - neg GI/hepatic ROS       Renal/Genitourinary:     (+) Other Renal/ Genitourinary, MENOMETRORRHAGIA      Endo:  - neg endo ROS       Psychiatric:  - neg psychiatric ROS       Infectious Disease:  - neg infectious disease ROS       Malignancy:      - no malignancy   Other:    (+) No chance of pregnancy   - neg other ROS                 Physical Exam  Normal systems: cardiovascular and dental    Airway   Mallampati: III  TM distance: >3 FB  Neck ROM: full    Dental   (+) caps    Cardiovascular   Rhythm and rate: regular and normal      Pulmonary    breath sounds clear to auscultation  PE comment: Prolonged expiratory phase                    Anesthesia Plan      History & Physical Review  History and physical reviewed and following examination; no interval change.    ASA Status:  3 .    NPO Status:  > 8 hours    Plan for General and ETT with Intravenous and Propofol induction. Maintenance will be Balanced.    PONV prophylaxis:  Ondansetron (or other 5HT-3), Scopolamine patch and Dexamethasone or Solumedrol  HCG Negative      Postoperative Care  Postoperative pain management:  IV analgesics and Oral pain medications.      Consents  Anesthetic plan, risks, benefits and alternatives discussed with:  Patient..                          .

## 2017-04-26 NOTE — PROVIDER NOTIFICATION
Dr. Trinh updated on pts  urine output during the last hour which is 21 ml.  Will continue to monitor hourly and update Dr. Trinh as he requested.

## 2017-04-26 NOTE — OP NOTE
[facility]    Pre-operative diagnosis: MENOMETRORRHAGIA/DYSMENORRHEA   Post-operative diagnosis Same, Pathology Pending   Procedure: Procedure(s):  VAGINAL HYSTERECTOMY - Wound Class: II-Clean Contaminated   Surgeon:  Assistant: MD Chasity Barber NP   Anesthesia: Other    Estimated blood loss: 300 ml   Blood transfusion: No transfusion was given during surgery   Drains: Verma catheter   Specimens: Uterus/cervix   Findings: Small uterine fibroid.   Otherwise normal pelvic anatomy.  On cystoscopy there was no evidence of sutures or perforation and bilateral ureteral jets were noted post procedure.    Complications: None   Condition: Stable         OPERATIVE DICTATION: The patient was brought to the operating room and uneventfully placed under general anesthesia. She was prepped and draped in the dorsal lithotomy position and her bladder drained. The cervix was visualized with a weighted speculum and grasped with two fine-toothed tenaculum. The cervix was injected with 1% lidocaine with epinephrine.   A circumferential cervical incision was performed with a scalpel. Posterior colpotomy was performed sharply without difficulty. The uterosacral ligaments were clamped, cut and suture ligated with 0 Vicryl. The bladder was then dissected anteriorly off the lower uterine segment and cervix and anterior colpotomy performed. A Lanie retractor was placed to displace the bladder superiorly. The cardinal and round ligament complexes were clamped, cut and suture ligated with 0 Vicryl. The uterus was inverted.  The remaining uteroovarian/.tuboovarian complex was clamped and cut on each side and the uterus removed.  The pedicles were suture ligated with 0 Vicryl.  A pack was placed to displace the bowel out of the pelvis.  There was some persistent bleeding from right cardinal ligament pedicle and uterine uterine artery that was controlled with the Ligasure bipolar cautery devices and figure of eight 0  vicryl.  An external Moreno suture was performed in the usual fashion using 0 Vicryl and plicating the uterosacral ligaments high in the pelvis. The Moreno's suture was tagged. A reperitonealizing stitch of 0 Vicryl was placed in a pursestring fashion incorporating the uterosacral ligament into the closure. The packing was removed and the pursestring tied. The vaginal cuff was closed with interrupted 2-0 Vicryl sutures with the uterosacral ligament complexes being incorporated it into the vaginal angle sutures. The cuff was irrigated and checked for hemostasis. The Moreno's suture was tied with resulting excellent apical vaginal support. Cystoscopy was then performed using a 70-degree rigid cystoscope with normal saline as distention media. Visualization was excellent. Bilateral ureteral jets were noted. There is no evidence of bladder perforation or suture. The cystoscope was withdrawn and a Verma catheter placed. There were no complications and the patient was transferred to the recovery room in excellent stable condition.   Antoni Trinh MD  4/26/2017  10:20 AM

## 2017-04-26 NOTE — PLAN OF CARE
Problem: Patient Goal: Rt Focus  Goal: 1. Patient Goal: RT Focus  [facility] - Respiratory Clinical Assessment     Current Patient History:    Respiratory History: tobacco abuse    Smoking History: 1/2 ppd    Oxygen dependency: No,    Oxygen prescribed: none     4/26/2017 1:43 PM Patient Initial Assessment:     Level of Consciousness: alert , cooperative    Skin color: pink    Lung sounds:clear    Respirations:  normal    Respiratory symptoms: non-productive cough    Cough/Sputum:  nonproductive    Current oxygenation status: 95% on RA

## 2017-04-26 NOTE — ANESTHESIA CARE TRANSFER NOTE
Patient: Raine Ayala    Procedure(s):  VAGINAL HYSTERECTOMY, CYSTOSCOPY - Wound Class: II-Clean Contaminated   - Wound Class: II-Clean Contaminated    Diagnosis: MENOMETRORRHAGIA  Diagnosis Additional Information: No value filed.    Anesthesia Type:   General, ETT     Note:  Airway :Nasal Cannula  Patient transferred to:PACU        Vitals: (Last set prior to Anesthesia Care Transfer)    CRNA VITALS  4/26/2017 1123 - 4/26/2017 1157      4/26/2017             Resp Rate (set): 8                Electronically Signed By: NEGRO Harmon CRNA  April 26, 2017  11:57 AM

## 2017-04-26 NOTE — IP AVS SNAPSHOT
MRN:[id number]                      After Visit Summary   4/26/2017    Raine Ayala    MRN: [id number]           Thank you!     Thank you for choosing [facility] for your care. Our goal is always to provide you with excellent care. Hearing back from our patients is one way we can continue to improve our services. Please take a few minutes to complete the written survey that you may receive in the mail after you visit with us. Thank you!        Patient Information     Date Of Birth          1970        Designated Caregiver       Most Recent Value    Caregiver    Will someone help with your care after discharge? yes    Name of designated caregiver Juan Manuel Fernandez     Phone number of caregiver [phone]    Caregiver address [place]       About your hospital stay     You were admitted on:  April 26, 2017 You last received care in the:  [facility]    You were discharged on:  April 27, 2017       Who to Call     For medical emergencies, please call 911.  For non-urgent questions about your medical care, please call your primary care provider or clinic, [phone]  For questions related to your surgery, please call your surgery clinic        Attending Provider     Provider Specialty    Antoni Trinh MD OB/Gyn       Primary Care Provider Office Phone # Fax #    Kaila Henry [phone]       [facility] [address] [place]  [facility] [address]        Your next 10 appointments already scheduled     Apr 28, 2017  3:45 PM CDT   (Arrive by 3:30 PM)   Post Op with Chasity Moser NP   [facility] [place] ([facility] [place] [facility])    [address]  [place] [address] [phone]            May 25, 2017 11:00 AM CDT   (Arrive by 10:45 AM)   Post Op with Antoni Trinh MD   [facility] [place] ([facility] [facility])    [address]  [place] [address]   [phone]              Further instructions from your care team       No heavy lifting  "greater than 20 lbs for 4 weeks  No driving while on pain meds  Pelvic rest for 4 weeks  No vigorous activity, exercise, swim, bath for 4 weeks  Schedule Postop check Dr. Trinh  Or Chasity Moser NP in 4 weeks  Call Dr. Trinh [phone] as necessary if problems in interim    You have a follow up appointment scheduled with Dr. Trinh on Thursday May 25th at 11:00am.      Pending Results     No orders found for last 3 day(s).            Statement of Approval     Ordered          04/27/17 0800  I have reviewed and agree with all the recommendations and orders detailed in this document.  EFFECTIVE NOW     Approved and electronically signed by:  Chasity Moser NP             Admission Information     Date & Time Provider Department Dept. Phone    4/26/2017 Antoni Trinh MD [facility] [phone]      Your Vitals Were     Blood Pressure Temperature Respirations Height Weight Last Period    105/60 (BP Location: Left arm) 98.6  F (37  C) (Tympanic) 16 1.6 m (5' 3\") 65.8 kg (145 lb) 04/12/2017 (Approximate)    Pulse Oximetry BMI (Body Mass Index)                98% 25.69 kg/m2          [email or web address] Information     [employer] gives you secure access to your electronic health record. If you see a primary care provider, you can also send messages to your care team and make appointments. If you have questions, please call your primary care clinic.  If you do not have a primary care provider, please call [phone] and they will assist you.        Care EveryWhere ID     This is your Care EveryWhere ID. This could be used by other organizations to access your [facility] medical records  [id number]           Review of your medicines      START taking        Dose / Directions    HYDROcodone-acetaminophen 5-325 MG per tablet   Commonly known as:  NORCO   Used for:  Acute post-operative pain        Dose:  1-2 tablet   Take 1-2 tablets by mouth every 4 hours as needed for moderate to severe pain   Quantity:  28 tablet   Refills:  0 "         CONTINUE these medicines which have NOT CHANGED        Dose / Directions    ibuprofen 800 MG tablet   Commonly known as:  ADVIL/MOTRIN   Used for:  Thoracic or lumbosacral neuritis or radiculitis, unspecified, Lumbago        TAKE 1 TABLET BY MOUTH EVERY 8 HOURS AS NEEDED   Quantity:  90 tablet   Refills:  0       * nicotine 21 MG/24HR 24 hr patch   Commonly known as:  NICODERM CQ   Used for:  Tobacco dependence        Dose:  1 patch   Place 1 patch onto the skin every 24 hours   Quantity:  30 patch   Refills:  1       * nicotine 14 MG/24HR 24 hr patch   Commonly known as:  NICODERM CQ   Used for:  Tobacco dependence        Dose:  1 patch   Place 1 patch onto the skin every 24 hours   Quantity:  30 patch   Refills:  1       * Notice:  This list has 2 medication(s) that are the same as other medications prescribed for you. Read the directions carefully, and ask your doctor or other care provider to review them with you.         Where to get your medicines      Some of these will need a paper prescription and others can be bought over the counter. Ask your nurse if you have questions.     Bring a paper prescription for each of these medications     HYDROcodone-acetaminophen 5-325 MG per tablet                Protect others around you: Learn how to safely use, store and throw away your medicines at www.disposemymeds.org.             Medication List: This is a list of all your medications and when to take them. Check marks below indicate your daily home schedule. Keep this list as a reference.      Medications           Morning Afternoon Evening Bedtime As Needed    HYDROcodone-acetaminophen 5-325 MG per tablet   Commonly known as:  NORCO   Take 1-2 tablets by mouth every 4 hours as needed for moderate to severe pain   Last time this was given:  1 tablet on 4/27/2017  4:12 PM                                ibuprofen 800 MG tablet   Commonly known as:  ADVIL/MOTRIN   TAKE 1 TABLET BY MOUTH EVERY 8 HOURS AS NEEDED                                 * nicotine 21 MG/24HR 24 hr patch   Commonly known as:  NICODERM CQ   Place 1 patch onto the skin every 24 hours   Last time this was given:  1 patch on 4/27/2017  6:48 AM                                * nicotine 14 MG/24HR 24 hr patch   Commonly known as:  NICODERM CQ   Place 1 patch onto the skin every 24 hours                                * Notice:  This list has 2 medication(s) that are the same as other medications prescribed for you. Read the directions carefully, and ask your doctor or other care provider to review them with you.              More Information        Discharge Instructions for Vaginal Hysterectomy   Vaginal hysterectomy is surgery to remove the uterus and often the cervix. It takes 4 to 6 weeks to recover from the procedure. Here s what you need to know about caring for yourself during this time. Follow these and any other instructions you are given.        Two types of vaginal hysterectomy  Vaginal hysterectomy is done through an incision inside the vagina. In some cases, 2 to 3 small incisions are also made in the skin. Instruments are then put through the small incisions to assist the procedure. This is called laparoscopically assisted vaginal hysterectomy or LAVH. If a hysterectomy is done vaginally, the cervix is always removed as well.     Home care    Plan to rest at home for 3 to 5 days after the surgery.    Take all prescribed medicine exactly as directed.    Continue the coughing and deep breathing exercises you learned in the hospital.    If you had LAVH, you will have several small incisions on your belly. Keep the incisions clean and dry. Change bandages as instructed.    After LAVH, you may have pain in your shoulder. This is normal and due to gas used to expand your belly during the surgery. The pain may last up to 7 days.    If you have stitches inside your vagina, they will absorb over time and do not need to be taken out.    Use sanitary pads  to absorb vaginal bleeding or discharge. Light bleeding is likely at first. You may have a brownish discharge for up to 6 weeks.    Do not use tampons or douches. They can cause infection.    Avoid constipation, which causes straining to pass stool. Eat fruits, vegetables, and whole-grain foods. Drink at least 8 glasses of fluid each day. If needed, ask your health care provider whether you should use a stool softener.  Activity  Full recovery may take 2 to 4 weeks. This varies from woman to woman. Increase your activities a little bit each day. While you are recovering:    Do not drive while you are taking opioid or other narcotic pain medicines.    Walk as often as you feel able. Walking prevents blood clots from forming. It also helps speed healing.    Climb stairs slowly. If you get tired, pause every few steps.    Do not do sports or strenuous activity until your health care provider says it s OK.    Avoid lifting anything heavier than 10 pounds for 4 to 6 weeks.    Ask others to help with chores and errands.    Bathe or shower according to your health care provider s instructions.    Do not have sex until your health care provider says it s OK.    Ask your health care provider when you can return to work.  Follow-up care  You will visit the health care provider again to be sure you are healing well. Keep all follow-up appointments. Be sure to tell your health care provider if you have hot flashes, mood swings, or irritability. Medicine may help ease these symptoms.  Life after hysterectomy  Because the procedure removes your uterus, you will no longer have menstrual periods. You will not be able to become pregnant. Also, you may not need Pap tests if your cervix was removed. Your health care provider can discuss these and other changes with you.  When to call your health care provider  Call your health care provider right away if you have any of the following after your surgery:    Fever of 100.4 F (38 C) or  higher    Vaginal bleeding that is bright red or soaks more than 1 pad in 60 minutes    Smelly or green-colored discharge from the vagina    Shortness of breath or chest pain    Nausea or comiting that continues for more than 1 day or that make it impossible to eat or drink    Inability to move the bowels for 3 days    Loose or watery stools 2 or more times a day OR bloody stools    Trouble urinating or burning during urination    Severe pain or bloating in your abdomen    Pain or swelling in your legs    For LAVH, redness, swelling, drainage, or increasing pain at an incision site    You feel unusually depressed or sad after the surgery     [id number] [employer]. [address] [place] [address]. All rights reserved. This information is not intended as a substitute for professional medical care. Always follow your healthcare professional's instructions.                Patient Education    Hydrocodone Bitartrate, Acetaminophen Oral capsule    Hydrocodone Bitartrate, Acetaminophen Oral solution    Hydrocodone Bitartrate, Acetaminophen Oral tablet  Hydrocodone Bitartrate, Acetaminophen Oral tablet  What is this medicine?  ACETAMINOPHEN; HYDROCODONE (a set a SHABBIR jason fen; kirby droe KOE done) is a pain reliever. It is used to treat moderate to severe pain.  This medicine may be used for other purposes; ask your health care provider or pharmacist if you have questions.  What should I tell my health care provider before I take this medicine?  They need to know if you have any of these conditions:    brain tumor    Crohn's disease, inflammatory bowel disease, or ulcerative colitis    drug abuse or addiction    head injury    heart or circulation problems    if you often drink alcohol    kidney disease or problems going to the bathroom    liver disease    lung disease, asthma, or breathing problems    an unusual or allergic reaction to acetaminophen, hydrocodone, other opioid analgesics, other  medicines, foods, dyes, or preservatives    pregnant or trying to get pregnant    breast-feeding  How should I use this medicine?  Take this medicine by mouth. Swallow it with a full glass of water. Follow the directions on the prescription label. If the medicine upsets your stomach, take the medicine with food or milk. Do not take more than you are told to take.  Talk to your pediatrician regarding the use of this medicine in children. This medicine is not approved for use in children.  Patients over 65 years may have a stronger reaction and need a smaller dose.  Overdosage: If you think you have taken too much of this medicine contact a poison control center or emergency room at once.  NOTE: This medicine is only for you. Do not share this medicine with others.  What if I miss a dose?  If you miss a dose, take it as soon as you can. If it is almost time for your next dose, take only that dose. Do not take double or extra doses.  What may interact with this medicine?    alcohol    antihistamines    isoniazid    medicines for depression, anxiety, or psychotic disturbances    medicines for sleep    muscle relaxants    naltrexone    narcotic medicines (opiates) for pain    phenobarbital    ritonavir    tramadol  This list may not describe all possible interactions. Give your health care provider a list of all the medicines, herbs, non-prescription drugs, or dietary supplements you use. Also tell them if you smoke, drink alcohol, or use illegal drugs. Some items may interact with your medicine.  What should I watch for while using this medicine?  Tell your doctor or health care professional if your pain does not go away, if it gets worse, or if you have new or a different type of pain. You may develop tolerance to the medicine. Tolerance means that you will need a higher dose of the medicine for pain relief. Tolerance is normal and is expected if you take the medicine for a long time.  Do not suddenly stop taking your  medicine because you may develop a severe reaction. Your body becomes used to the medicine. This does NOT mean you are addicted. Addiction is a behavior related to getting and using a drug for a non-medical reason. If you have pain, you have a medical reason to take pain medicine. Your doctor will tell you how much medicine to take. If your doctor wants you to stop the medicine, the dose will be slowly lowered over time to avoid any side effects.  You may get drowsy or dizzy when you first start taking the medicine or change doses. Do not drive, use machinery, or do anything that may be dangerous until you know how the medicine affects you. Stand or sit up slowly.  There are different types of narcotic medicines (opiates) for pain. If you take more than one type at the same time, you may have more side effects. Give your health care provider a list of all medicines you use. Your doctor will tell you how much medicine to take. Do not take more medicine than directed. Call emergency for help if you have problems breathing.  The medicine will cause constipation. Try to have a bowel movement at least every 2 to 3 days. If you do not have a bowel movement for 3 days, call your doctor or health care professional.  Too much acetaminophen can be very dangerous. Do not take Tylenol (acetaminophen) or medicines that contain acetaminophen with this medicine. Many non-prescription medicines contain acetaminophen. Always read the labels carefully.  What side effects may I notice from receiving this medicine?  Side effects that you should report to your doctor or health care professional as soon as possible:    allergic reactions like skin rash, itching or hives, swelling of the face, lips, or tongue    breathing problems    confusion    feeling faint or lightheaded, falls    stomach pain    yellowing of the eyes or skin  Side effects that usually do not require medical attention (report to your doctor or health care professional  if they continue or are bothersome):    nausea, vomiting    stomach upset  This list may not describe all possible side effects. Call your doctor for medical advice about side effects. You may report side effects to FDA at 4-157-XHW-2395.  Where should I keep my medicine?  Keep out of the reach of children. This medicine can be abused. Keep your medicine in a safe place to protect it from theft. Do not share this medicine with anyone. Selling or giving away this medicine is dangerous and against the law.  Store at room temperature between 15 and 30 degrees C (59 and 86 degrees F). Protect from light. Keep container tightly closed.  Throw away any unused medicine after the expiration date. Discard unused medicine and used packaging carefully. Pets and children can be harmed if they find used or lost packages.  NOTE: This sheet is a summary. It may not cover all possible information. If you have questions about this medicine, talk to your doctor, pharmacist, or health care provider.  NOTE:This sheet is a summary. It may not cover all possible information. If you have questions about this medicine, talk to your doctor, pharmacist, or health care provider. Copyright  2016 [employer]                Catheter-Associated Urinary Tract Infections  A catheter-associated urinary tract infection (CAUTI) is an infection of the urinary system. CAUTI is caused by germs that enter the urinary tract when a urinary catheter is used. This is a tube that s placed into the bladder to drain urine.     A small balloon keeps the catheter in place inside the bladder.   The urinary system  This system includes the kidneys, ureters, bladder, and urethra. The kidneys filter blood and make urine. The ureters carry urine from the kidneys to the bladder. The bladder stores urine. The urethra carries urine from the bladder to the outside of the body.  What is a urinary catheter?  A urinary catheter is a thin, flexible tube. It is placed in the  bladder to drain urine. Urine flows through the tube into a collecting bag outside of the body. There are different types of urinary catheters. The most common type is an indwelling catheter. This is also known as a urethral catheter. This is because it s placed into the bladder through the urethra. This catheter is also called a Verma catheter.  Why is a urinary catheter needed?  A urinary catheter is needed for any of the following:    You can t get up to use the toilet because your mobility is limited. This may be due to surgery, an injury, or illness.    You have a blockage in your urinary system.    Your health care provider needs to measure the amount of urine you pass.    The function of your kidneys and bladder is being tested.    You re not able to control your bladder (incontinence).  In most cases, the urinary catheter is temporary. You'll need it only until the problem that requires it is resolved.   How does a CAUTI develop?  Germs can enter the urinary tract as the catheter is put into the urethra. Germs can also get into the urinary tract while the catheter is in place. The common germs that cause a CAUTI are ones that live in the intestine. These germs don t normally cause problems in the intestine. But when they get into the urinary tract, a CAUTI can result.  Why is a CAUTI of concern?  Left untreated, a CAUTI can lead to health problems. These problems may include bladder infection, prostate infection, and kidney infection. A CAUTI can prolong your hospital stay. If the infection is not treated in time, serious health complications may occur.  Symptoms of a CAUTI    A burning feeling, pressure, or pain in your lower abdomen    Fever or chills    Urine in the collecting bag is cloudy or bloody (pink or red)    Burning feeling in the urethra or genital area    Aching in the back (kidney area)    Nausea and vomiting    Person is confused, or is not alert, or has a change in behavior (mainly affects  older patients)    Note that sometimes a person won t have any symptoms but may still have a CAUTI.  Tell a health care provider right away if you or your loved one has any of these symptoms.  Diagnosing CAUTI  If you have symptoms of CAUTI your health care provider will order tests. These include a urine test, blood tests, and other tests as needed.  Treating a CAUTI  Treatment may involve any of the following:    Antibiotics. Your health care provider will likely prescribe antibiotics if you have symptoms. Be aware that if you don t have symptoms, you may not be given antibiotics. This is to prevent an increase in germs that resist (can t be killed by) certain antibiotics.    Removing the catheter. The catheter will be removed when your health care provider decides it s no longer needed. This usually helps stop the infection.    Changing the catheter. If you still need a catheter, the old one will be removed. A new one will be put in. This may help stop the infection.  How hospital and long-term facility staff prevent CAUTI  To keep patients from getting a CAUTI, the staff follow certain procedures:    Prescribe a catheter only when it s needed. It is removed as soon as it s no longer needed.    Use sterile (clean) technique when placing the catheter into the urinary tract. This means before putting the catheter in, the caregiver washes his or her hands with soap and water. He or she then puts on sterile gloves. A sterile catheter kit that has cleansers is used to cleanse the patient s genital area.    Before performing catheter care, caregivers also wash their hands or use an alcohol-based hand cleanser.    Hang the bag lower than your bladder. This prevents urine from flowing back into your bladder.    Ensure that the bag is emptied regularly.  What you can do as a patient to prevent CAUTI  You can help prevent yourself from getting a CAUTI by doing the following:    Every day ask your health care provider how  long you need to have the catheter. The longer you have a catheter, the higher your chance of getting a CAUTI.    If a caregiver doesn t clean his or her hands and put on gloves before touching your catheter, ask them to do so.    If you ve been taught how to care for your catheter, be sure to wash your hands before and after each session.    Make sure your bag is lower than your bladder. If it s not, tell your caregiver.    Don t disconnect the catheter and drain tube. Doing so allows germs to get into the catheter.    Cleansing of the genital and perineal areas is very important to help decrease bacteria in areas surrounding the catheter. Ask your doctor what you should use and how often to clean these areas.  If you are discharged with an indwelling catheter    Before you leave the hospital, make sure you understand the instructions on how to care for your catheter at home.    Ask your health care provider how long you need the catheter. Also ask if you need to make a follow-up appointment to have the catheter removed.    Always use sterile (clean) technique when caring for your catheter. Wash your hands before and after doing any catheter care.    Call your health care provider right away if you develop symptoms of a CAUTI (see above).       [phone] [employer]. [address] [place] [address], [place] [address]. All rights reserved. This information is not intended as a substitute for professional medical care. Always follow your healthcare professional's instructions.

## 2017-04-27 VITALS
HEIGHT: 63 IN | SYSTOLIC BLOOD PRESSURE: 105 MMHG | DIASTOLIC BLOOD PRESSURE: 60 MMHG | BODY MASS INDEX: 25.69 KG/M2 | OXYGEN SATURATION: 98 % | WEIGHT: 145 LBS | RESPIRATION RATE: 16 BRPM | TEMPERATURE: 98.6 F

## 2017-04-27 PROBLEM — Z90.710 S/P VAGINAL HYSTERECTOMY: Status: ACTIVE | Noted: 2017-04-27

## 2017-04-27 LAB
ANION GAP SERPL CALCULATED.3IONS-SCNC: 7 MMOL/L (ref 3–14)
BASOPHILS # BLD AUTO: 0 10E9/L (ref 0–0.2)
BASOPHILS NFR BLD AUTO: 0.1 %
BUN SERPL-MCNC: 8 MG/DL (ref 7–30)
CALCIUM SERPL-MCNC: 8.1 MG/DL (ref 8.5–10.1)
CHLORIDE SERPL-SCNC: 108 MMOL/L (ref 94–109)
CO2 SERPL-SCNC: 26 MMOL/L (ref 20–32)
COPATH REPORT: NORMAL
CREAT SERPL-MCNC: 0.6 MG/DL (ref 0.52–1.04)
DIFFERENTIAL METHOD BLD: ABNORMAL
EOSINOPHIL # BLD AUTO: 0 10E9/L (ref 0–0.7)
EOSINOPHIL NFR BLD AUTO: 0.1 %
ERYTHROCYTE [DISTWIDTH] IN BLOOD BY AUTOMATED COUNT: 12.4 % (ref 10–15)
GFR SERPL CREATININE-BSD FRML MDRD: ABNORMAL ML/MIN/1.7M2
GLUCOSE SERPL-MCNC: 116 MG/DL (ref 70–99)
HCT VFR BLD AUTO: 31 % (ref 35–47)
HGB BLD-MCNC: 10.7 G/DL (ref 11.7–15.7)
IMM GRANULOCYTES # BLD: 0.1 10E9/L (ref 0–0.4)
IMM GRANULOCYTES NFR BLD: 0.4 %
LYMPHOCYTES # BLD AUTO: 1.2 10E9/L (ref 0.8–5.3)
LYMPHOCYTES NFR BLD AUTO: 9.5 %
MCH RBC QN AUTO: 34.2 PG (ref 26.5–33)
MCHC RBC AUTO-ENTMCNC: 34.5 G/DL (ref 31.5–36.5)
MCV RBC AUTO: 99 FL (ref 78–100)
MONOCYTES # BLD AUTO: 1 10E9/L (ref 0–1.3)
MONOCYTES NFR BLD AUTO: 8.2 %
NEUTROPHILS # BLD AUTO: 10 10E9/L (ref 1.6–8.3)
NEUTROPHILS NFR BLD AUTO: 81.7 %
NRBC # BLD AUTO: 0 10*3/UL
NRBC BLD AUTO-RTO: 0 /100
PLATELET # BLD AUTO: 223 10E9/L (ref 150–450)
POTASSIUM SERPL-SCNC: 4.3 MMOL/L (ref 3.4–5.3)
RBC # BLD AUTO: 3.13 10E12/L (ref 3.8–5.2)
SODIUM SERPL-SCNC: 141 MMOL/L (ref 133–144)
WBC # BLD AUTO: 12.3 10E9/L (ref 4–11)

## 2017-04-27 PROCEDURE — 85025 COMPLETE CBC W/AUTO DIFF WBC: CPT | Performed by: OBSTETRICS & GYNECOLOGY

## 2017-04-27 PROCEDURE — 25800025 ZZH RX 258: Performed by: OBSTETRICS & GYNECOLOGY

## 2017-04-27 PROCEDURE — 25000128 H RX IP 250 OP 636: Performed by: OBSTETRICS & GYNECOLOGY

## 2017-04-27 PROCEDURE — 40000275 ZZH STATISTIC RCP TIME EA 10 MIN

## 2017-04-27 PROCEDURE — 80048 BASIC METABOLIC PNL TOTAL CA: CPT | Performed by: OBSTETRICS & GYNECOLOGY

## 2017-04-27 PROCEDURE — 25000132 ZZH RX MED GY IP 250 OP 250 PS 637: Performed by: OBSTETRICS & GYNECOLOGY

## 2017-04-27 PROCEDURE — 36415 COLL VENOUS BLD VENIPUNCTURE: CPT | Performed by: OBSTETRICS & GYNECOLOGY

## 2017-04-27 RX ORDER — HYDROCODONE BITARTRATE AND ACETAMINOPHEN 5; 325 MG/1; MG/1
1-2 TABLET ORAL EVERY 4 HOURS PRN
Qty: 28 TABLET | Refills: 0 | Status: SHIPPED | OUTPATIENT
Start: 2017-04-27 | End: 2017-05-25

## 2017-04-27 RX ADMIN — SODIUM CHLORIDE, POTASSIUM CHLORIDE, SODIUM LACTATE AND CALCIUM CHLORIDE: 600; 310; 30; 20 INJECTION, SOLUTION INTRAVENOUS at 00:50

## 2017-04-27 RX ADMIN — NICOTINE 1 PATCH: 21 PATCH, EXTENDED RELEASE TRANSDERMAL at 06:48

## 2017-04-27 RX ADMIN — KETOROLAC TROMETHAMINE 30 MG: 30 INJECTION, SOLUTION INTRAMUSCULAR; INTRAVENOUS at 00:50

## 2017-04-27 RX ADMIN — HYDROCODONE BITARTRATE AND ACETAMINOPHEN 1 TABLET: 5; 325 TABLET ORAL at 16:12

## 2017-04-27 RX ADMIN — HYDROCODONE BITARTRATE AND ACETAMINOPHEN 1 TABLET: 5; 325 TABLET ORAL at 12:12

## 2017-04-27 RX ADMIN — HYDROCODONE BITARTRATE AND ACETAMINOPHEN 1 TABLET: 5; 325 TABLET ORAL at 06:47

## 2017-04-27 RX ADMIN — KETOROLAC TROMETHAMINE 30 MG: 30 INJECTION, SOLUTION INTRAMUSCULAR; INTRAVENOUS at 06:53

## 2017-04-27 RX ADMIN — HYDROCODONE BITARTRATE AND ACETAMINOPHEN 1 TABLET: 5; 325 TABLET ORAL at 02:15

## 2017-04-27 ASSESSMENT — PAIN DESCRIPTION - DESCRIPTORS
DESCRIPTORS: CRAMPING
DESCRIPTORS: CRAMPING

## 2017-04-27 NOTE — PROGRESS NOTES
"[facility]  Daily Post-Op Note         Assessment and Plan:    Assessment:   Post-operative day #1  Procedure(s):  HYSTERECTOMY VAGINAL  CYSTOSCOPY     Doing well.  Clean wound without signs of infection.  Normal healing wound.  No immediate surgical complications identified.  No excessive bleeding  Pain well-controlled.      Plan:   Ambulate  Advance activity as tolerated  Continue supportive and symptomatic treatment  Pain control measures  Discharge when voiding.            Interval History:   Stable.  Doing well.  Improving slowly.  Pain is reasonably controlled.  No fevers. No void.           Review of Systems:    CONSTITUTIONAL:NEGATIVE  for fever   R: NEGATIVE for significant cough or SOB  CV: NEGATIVE for chest pain, palpitations or peripheral edema  GI: NEGATIVE for nausea and vomiting  : negative for bleeding             Medications:   I have reviewed this patient's current medications          Physical Exam:   Vitals were reviewed /60  Temp 98.8  F (37.1  C) (Tympanic)  Resp 16  Ht 1.6 m (5' 3\")  Wt 65.8 kg (145 lb)  LMP 04/12/2017 (Approximate)  SpO2 96%  BMI 25.69 kg/m2  All vitals stable  Lungs clear  C-RRR  Abdomen soft.  Wound clean and dry with minimal or no drainage.             Data:   All laboratory data related to this surgery reviewed    "

## 2017-04-27 NOTE — PROVIDER NOTIFICATION
Dr. Trinh notified of patient void. Output 375 and 374 still in the bladder. Orders received for patient to make attempt to void if unable need to straight cath patient and then next time she voids need to rescan bladder. Urine remaining in bladder needs to be 150 mL or less.

## 2017-04-27 NOTE — PROVIDER NOTIFICATION
Dr. Trinh notified of patient bladder scan of 234 still in the bladder. Order received that bladder scanner must read 150 or less in the bladder after voiding to go home.

## 2017-04-27 NOTE — PLAN OF CARE
Problem: Goal Outcome Summary  Goal: Goal Outcome Summary  PRIOR TO DISCHARGE    Comments: List all Outpatient Overnight Discharge goals to be met before discharge home:   ~ Patient able to ambulate as they were prior to admission or with assist devices provided by therapies during their stay.   ~ Nurse to notify MD when Outpatient Discharge goals have been met and patient is ready for discharge     Outcome: Improving  Pt presented with VSS, rating pain max of 5/10. Pain controlled with PRN Norco with adequate results. Naima pad has scant amount of dried brown drainage which has been unchanged throughout the night. Verma catheter in place, pt putting out excellent amount of urine throughout the night. IV infusing LR at 100ml/hr without complication. Pt tolerating PO without complication. IV saline locked at 0600. Verma catheter removed at 0600 as well. Pt is due to void. Independent in room. Calls appropriately.     Temp: 98.8  F (37.1  C) Temp src: Tympanic BP: 101/60   Heart Rate: 69 Resp: 16 SpO2: 96 % O2 Device: None (Room air)

## 2017-04-27 NOTE — ANESTHESIA POSTPROCEDURE EVALUATION
Patient: Raine Ayala    Procedure(s):  VAGINAL HYSTERECTOMY, CYSTOSCOPY - Wound Class: II-Clean Contaminated   - Wound Class: II-Clean Contaminated    Diagnosis:MENOMETRORRHAGIA  Diagnosis Additional Information: No value filed.    Anesthesia Type:  General, ETT    Note:  Anesthesia Post Evaluation    Patient location during evaluation: PACU, Floor and Bedside  Patient participation: Able to fully participate in evaluation  Level of consciousness: awake and alert  Pain management: adequate  Airway patency: patent  Cardiovascular status: acceptable  Respiratory status: acceptable  Hydration status: stable  PONV: none     Anesthetic complications: None          Last vitals:  Vitals:    04/26/17 1625 04/26/17 1815 04/26/17 2204   BP: 110/67 110/63 101/60   Resp: 16 16 16   Temp: 97.8  F (36.6  C)  98.8  F (37.1  C)   SpO2:   96%         Electronically Signed By: Lester Mccartney MD  April 27, 2017  5:59 AM

## 2017-04-27 NOTE — PLAN OF CARE
Face to face report given with opportunity to observe patient.    Report given to MACY Paez and MACY Kang.     Portia Amos   4/27/2017  7:10 AM

## 2017-04-27 NOTE — PROVIDER NOTIFICATION
Dr. Trinh notified of patients bladder scan volume of 468. Received order to send patient home with montemayor leg bag and be seen tomorrow in the clinic by Analisa

## 2017-04-27 NOTE — PLAN OF CARE
Problem: Patient Goal: Rt Focus  Goal: 1. Patient Goal: RT Focus  Outcome: No Change  Patient using IS independently.

## 2017-04-27 NOTE — PLAN OF CARE
"Problem: Goal Outcome Summary  Goal: Goal Outcome Summary  PRIOR TO DISCHARGE    Comments: List all Outpatient Overnight Discharge goals to be met before discharge home:   ~ Patient able to ambulate as they were prior to admission or with assist devices provided by therapies during their stay.   ~ Nurse to notify MD when Outpatient Discharge goals have been met and patient is ready for discharge     Outcome: Improving  /60 (BP Location: Left arm)  Temp 98.6  F (37  C) (Tympanic)  Resp 16  Ht 1.6 m (5' 3\")  Wt 65.8 kg (145 lb)  LMP 04/12/2017 (Approximate)  SpO2 98%  BMI 25.69 kg/m2      A&Ox3. \"Ready to go home\". Reports abdominal pain this shift medicated with Norco per MAR. Reported relief of pain. IV SL. Pt made multiple attempts to void and have 150 mL or less in bladder post void but was successful. Patient was straight cath with 1600 return. After 1500 patient was still unable to void with post scan showing 150 ml or less in bladder. Dr. Trinh gave order for patient to go home with leg bag montemayor cath with clinical visit tomorrow with Chasity. Pt stable on feet. Minimal vaginal bleeding post surgery this shift noted on pad. No falls this shift. Pt up walking in hallways with . Will continue to monitor.      Face to face report given with opportunity to observe patient.     Report given to Elaine CAVAZOS & Yuliya Pedraza   4/27/2017  4:51 PM        Problem: Hysterectomy (Adult)  Goal: Signs and Symptoms of Listed Potential Problems Will be Absent or Manageable (Hysterectomy)  Signs and symptoms of listed potential problems will be absent or manageable by discharge/transition of care (reference Hysterectomy (Adult) CPG).   Outcome: Improving    04/27/17 1643   Hysterectomy   Problems Assessed (Hysterectomy) all   Problems Present (Hysterectomy) pain           "

## 2017-04-27 NOTE — DISCHARGE SUMMARY
"Discharge Summary    Raine Ayala MRN# [id number]   YOB: 1970 Age: 46 year old     Date of Admission:  4/26/2017  Date of Discharge:  4/27/2017  Admitting Physician:  Antoni Trinh MD  Discharge Physician:  Chasity Moser NP  Discharging Service:  Obstetrics and Gynecology     Primary Provider: Kaila Henry          Admission Diagnoses:   MENOMETRORRHAGIA  Surgery, elective          Discharge Diagnosis:   Patient Active Problem List   Diagnosis     ACP (advance care planning)     Excessive or frequent menstruation     Encounter for IUD insertion     Uterine cramping     Surgery, elective     S/P vaginal hysterectomy                Discharge Disposition:   Discharged to home           Condition on Discharge:   Discharge condition: Stable   Discharge vitals: Blood pressure 101/60, temperature 98.8  F (37.1  C), temperature source Tympanic, resp. rate 16, height 1.6 m (5' 3\"), weight 65.8 kg (145 lb), last menstrual period 04/12/2017, SpO2 96 %.   Code status on discharge: Full Code           Procedures / Labs / Imaging:   Invasive procedures: vaginal hysterectomy  Cystoscopy  Verma catheter          Medications Prior to Admission:     Prescriptions Prior to Admission   Medication Sig Dispense Refill Last Dose     nicotine (NICODERM CQ) 21 MG/24HR 24 hr patch Place 1 patch onto the skin every 24 hours 30 patch 1      nicotine (NICODERM CQ) 14 MG/24HR 24 hr patch Place 1 patch onto the skin every 24 hours 30 patch 1 4/26/2017 at Unknown time     ibuprofen (ADVIL,MOTRIN) 800 MG tablet TAKE 1 TABLET BY MOUTH EVERY 8 HOURS AS NEEDED 90 tablet 0 Past Month at Unknown time             Discharge Medications:     Current Discharge Medication List      START taking these medications    Details   HYDROcodone-acetaminophen (NORCO) 5-325 MG per tablet Take 1-2 tablets by mouth every 4 hours as needed for moderate to severe pain  Qty: 28 tablet, Refills: 0    Associated Diagnoses: Acute post-operative " pain         CONTINUE these medications which have NOT CHANGED    Details   nicotine (NICODERM CQ) 21 MG/24HR 24 hr patch Place 1 patch onto the skin every 24 hours  Qty: 30 patch, Refills: 1    Associated Diagnoses: Tobacco dependence      nicotine (NICODERM CQ) 14 MG/24HR 24 hr patch Place 1 patch onto the skin every 24 hours  Qty: 30 patch, Refills: 1    Associated Diagnoses: Tobacco dependence      ibuprofen (ADVIL,MOTRIN) 800 MG tablet TAKE 1 TABLET BY MOUTH EVERY 8 HOURS AS NEEDED  Qty: 90 tablet, Refills: 0    Associated Diagnoses: Thoracic or lumbosacral neuritis or radiculitis, unspecified; Lumbago                   Consultations:   No consultations were requested during this admission             Brief History of Illness:   Raine Ayala is a 46 year old female who was admitted for elective vaginal hysterectomy for management of menorrhagia.  Surgical and post op course uncomplicated.            Hospital Course:     Surgery, elective    S/P vaginal hysterectomy    * No resolved hospital problems. *                 Significant Results:   None             Pending Results:   None           Discharge Instructions and Follow-Up:   Discharge diet: Regular   Discharge activity: No lifting, driving, or strenuous exercise for 4 week(s)  No driving or operating machinery while on narcotic analgesics  Pelvic rest: abstain from intercourse and do not use tampons for 4 week(s)   Discharge follow-up: Follow up with me in 4 weeks   Wound care: None   Other instructions: None

## 2017-04-27 NOTE — PROVIDER NOTIFICATION
Dr. Trinh aware of patients urinary retention. Orders received to bladder scan patient at 1500 after attempting to void, and call him with outcome.

## 2017-04-27 NOTE — PROGRESS NOTES
Medical record and Complexity Screen reviewed. No apparent needs noted at this time. Care Transitions will remain available if needs arise.

## 2017-04-27 NOTE — PLAN OF CARE
Problem: Patient Goal: Rt Focus  Goal: 1. Patient Goal: RT Focus  Pt' was able to raise IS to 2000 mls- has been up walking-NPC

## 2017-04-27 NOTE — DISCHARGE INSTRUCTIONS
No heavy lifting greater than 20 lbs for 4 weeks  No driving while on pain meds  Pelvic rest for 4 weeks  No vigorous activity, exercise, swim, bath for 4 weeks  Schedule Postop check Dr. Trinh  Or Chasity Moser NP in 4 weeks  Call Dr. Trinh [phone] as necessary if problems in interim    You have a follow up appointment scheduled with Dr. Trinh on Thursday May 25th at 11:00am.

## 2017-04-28 ENCOUNTER — OFFICE VISIT (OUTPATIENT)
Dept: OBGYN | Facility: OTHER | Age: 47
End: 2017-04-28
Attending: NURSE PRACTITIONER
Payer: COMMERCIAL

## 2017-04-28 VITALS
OXYGEN SATURATION: 100 % | SYSTOLIC BLOOD PRESSURE: 108 MMHG | HEART RATE: 59 BPM | TEMPERATURE: 98.8 F | WEIGHT: 145 LBS | DIASTOLIC BLOOD PRESSURE: 64 MMHG | BODY MASS INDEX: 25.69 KG/M2 | HEIGHT: 63 IN

## 2017-04-28 DIAGNOSIS — Z46.6 ENCOUNTER FOR FOLEY CATHETER REMOVAL: ICD-10-CM

## 2017-04-28 DIAGNOSIS — Z98.890 POSTOPERATIVE STATE: Primary | ICD-10-CM

## 2017-04-28 PROCEDURE — 99024 POSTOP FOLLOW-UP VISIT: CPT | Performed by: NURSE PRACTITIONER

## 2017-04-28 ASSESSMENT — PAIN SCALES - GENERAL: PAINLEVEL: NO PAIN (0)

## 2017-04-28 NOTE — PLAN OF CARE
Problem: Goal Outcome Summary  Goal: Goal Outcome Summary  PRIOR TO DISCHARGE    Comments: List all Outpatient Overnight Discharge goals to be met before discharge home:   ~ Patient able to ambulate as they were prior to admission or with assist devices provided by therapies during their stay.   ~ Nurse to notify MD when Outpatient Discharge goals have been met and patient is ready for discharge     Outcome: Adequate for Discharge Date Met:  04/27/17  Patient discharged at 5:10 PM via ambulation accompanied by spouse and staff. Prescriptions sent with patient to fill . All belongings sent with patient.  Verma inserted prior to discharge.  Pt had 1100 ml of clear pale yellow urine returned.  Pt had no active bleeding from vagina at time of insertion.  Very scant amount of blood on perineal pad.  Pt educated on caudi and verbalized understanding. Pt reports mild pain at time of discharge.      Discharge instructions reviewed with patient. Listed belongings gathered and returned to patient. Pt's clothes and prescription.      Patient discharged to home.      Core Measures and Vaccines  Core Measures applicable during stay: No. If yes, state diagnosis: NA  Pneumonia and Influenza given prior to discharge, if indicated: No     Surgical Patient   Surgical Procedures during stay: Vaginal Hysterectomy  Did patient receive discharge instruction on wound care and recognition of infection symptoms? Yes     MISC  Follow up appointment made:  Yes  Home and hospital aquired medications returned to patient: N/A  Patient reports pain was well managed at discharge: Yes

## 2017-04-28 NOTE — NURSING NOTE
"Chief Complaint   Patient presents with     Surgical Followup     Hyst 4/26/2017       Initial /64  Pulse 59  Temp 98.8  F (37.1  C) (Tympanic)  Ht 5' 3\" (1.6 m)  Wt 145 lb (65.8 kg)  LMP 04/12/2017 (Approximate)  SpO2 100%  BMI 25.69 kg/m2 Estimated body mass index is 25.69 kg/(m^2) as calculated from the following:    Height as of this encounter: 5' 3\" (1.6 m).    Weight as of this encounter: 145 lb (65.8 kg).  Medication Reconciliation: complete       Elaine Aldana      "

## 2017-04-28 NOTE — MR AVS SNAPSHOT
After Visit Summary   4/28/2017    Raine Ayala    MRN: [id number]           Patient Information     Date Of Birth          1970        Visit Information        Provider Department      4/28/2017 3:45 PM Chasity Moser NP [facility]        Today's Diagnoses     Postoperative state    -  1    Encounter for Verma catheter removal          Care Instructions    Follow up for full postop exam in 4 weeks as scheduled        Follow-ups after your visit        Your next 10 appointments already scheduled     May 25, 2017 11:00 AM CDT   (Arrive by 10:45 AM)   Post Op with Antoni Trinh MD   [facility] ([facility])    [address]  [place] [address]   [phone]              Who to contact     If you have questions or need follow up information about today's clinic visit or your schedule please contact [facility] [place] directly at [phone].  Normal or non-critical lab and imaging results will be communicated to you by MyChart, letter or phone within 4 business days after the clinic has received the results. If you do not hear from us within 7 days, please contact the clinic through MyChart or phone. If you have a critical or abnormal lab result, we will notify you by phone as soon as possible.  Submit refill requests through [employer] or call your pharmacy and they will forward the refill request to us. Please allow 3 business days for your refill to be completed.          Additional Information About Your Visit        MyChart Information     [employer] gives you secure access to your electronic health record. If you see a primary care provider, you can also send messages to your care team and make appointments. If you have questions, please call your primary care clinic.  If you do not have a primary care provider, please call [phone] and they will assist you.        Care EveryWhere ID     This is your Care EveryWhere ID. This could be used by other  "organizations to access your [place] medical records  [id number]        Your Vitals Were     Pulse Temperature Height Last Period Pulse Oximetry BMI (Body Mass Index)    59 98.8  F (37.1  C) (Tympanic) 5' 3\" (1.6 m) 04/12/2017 (Approximate) 100% 25.69 kg/m2       Blood Pressure from Last 3 Encounters:   04/28/17 108/64   04/27/17 105/60   04/14/17 100/60    Weight from Last 3 Encounters:   04/28/17 145 lb (65.8 kg)   04/26/17 145 lb (65.8 kg)   04/14/17 145 lb 6.4 oz (66 kg)              Today, you had the following     No orders found for display       Primary Care Provider Office Phone # Fax #    Kaila Henyr [phone]       [facility] [address]  [facility] [address]        Thank you!     Thank you for choosing [facility]  for your care. Our goal is always to provide you with excellent care. Hearing back from our patients is one way we can continue to improve our services. Please take a few minutes to complete the written survey that you may receive in the mail after your visit with us. Thank you!             Your Updated Medication List - Protect others around you: Learn how to safely use, store and throw away your medicines at www.disposemymeds.org.          This list is accurate as of: 4/28/17 11:59 PM.  Always use your most recent med list.                   Brand Name Dispense Instructions for use    HYDROcodone-acetaminophen 5-325 MG per tablet    NORCO    28 tablet    Take 1-2 tablets by mouth every 4 hours as needed for moderate to severe pain       ibuprofen 800 MG tablet    ADVIL/MOTRIN    90 tablet    TAKE 1 TABLET BY MOUTH EVERY 8 HOURS AS NEEDED       * nicotine 21 MG/24HR 24 hr patch    NICODERM CQ    30 patch    Place 1 patch onto the skin every 24 hours       * nicotine 14 MG/24HR 24 hr patch    NICODERM CQ    30 patch    Place 1 patch onto the skin every 24 hours       * Notice:  This list has 2 medication(s) that are the same " as other medications prescribed for you. Read the directions carefully, and ask your doctor or other care provider to review them with you.

## 2017-04-29 NOTE — PROGRESS NOTES
Rick Ayala 46 year old female presents for post operative check and montemayor catheter removal. She is  3  day(s) status post Trans vaginal hysterectomy.  She reports doing well and denies significant pain or bleeding. There were no pathological findings.    O.  B/P: 108/64, T: 98.8, P: 59, R: Data Unavailable    Abd: soft, non-tender, non-distended. Incision clear, dry, and intact without evidence of infection.    A.    Postoperative state  Encounter for Montemayor catheter removal       Doing well  Void of 250 clear urine with 130 cc remaining by bladder scan.    She is encouraged to call with any questions or concerns.  She is scheduled for full post op exam in 4 weeks.

## 2017-05-25 ENCOUNTER — OFFICE VISIT (OUTPATIENT)
Dept: OBGYN | Facility: OTHER | Age: 47
End: 2017-05-25
Attending: OBSTETRICS & GYNECOLOGY
Payer: COMMERCIAL

## 2017-05-25 VITALS
HEART RATE: 67 BPM | DIASTOLIC BLOOD PRESSURE: 68 MMHG | OXYGEN SATURATION: 98 % | HEIGHT: 63 IN | BODY MASS INDEX: 25.69 KG/M2 | WEIGHT: 145 LBS | SYSTOLIC BLOOD PRESSURE: 112 MMHG | TEMPERATURE: 98.4 F

## 2017-05-25 DIAGNOSIS — Z98.890 POST-OPERATIVE STATE: Primary | ICD-10-CM

## 2017-05-25 PROCEDURE — 99024 POSTOP FOLLOW-UP VISIT: CPT | Performed by: OBSTETRICS & GYNECOLOGY

## 2017-05-25 ASSESSMENT — PAIN SCALES - GENERAL: PAINLEVEL: NO PAIN (0)

## 2017-05-25 NOTE — PROGRESS NOTES
"RAMO Ayala is a 46 year old female presents for post operative check. She is  4  week(s) status post Trans vaginal hysterectomy.  She reports doing well and denies significant pain or bleeding.  Bowel and bladder function is satisfactory. Denies incisional problems. Significant findings Benign    O.  Blood pressure 112/68, pulse 67, temperature 98.4  F (36.9  C), temperature source Tympanic, height 5' 3\" (1.6 m), weight 145 lb (65.8 kg), last menstrual period 04/12/2017, SpO2 98 %.    Abd: soft, non-tender, non-distended. Incisions clear, dry, and intact without evidence of infection.  Vagina well supported.      A. /P. Satisfactory post-op check.Released from restrictions.        Antoni Trinh  "

## 2017-05-25 NOTE — MR AVS SNAPSHOT
"              After Visit Summary   5/25/2017    Raine Ayala    MRN: [id number]           Patient Information     Date Of Birth          1970        Visit Information        Provider Department      5/25/2017 11:00 AM [facility]        Today's Diagnoses     Post-operative state    -  1      Care Instructions    F/u prn problems or at next annual examination.          Follow-ups after your visit        Who to contact     If you have questions or need follow up information about today's clinic visit or your schedule please contact [facility] [place] directly at [phone].  Normal or non-critical lab and imaging results will be communicated to you by [employer], letter or phone within 4 business days after the clinic has received the results. If you do not hear from us within 7 days, please contact the clinic through [employer] or phone. If you have a critical or abnormal lab result, we will notify you by phone as soon as possible.  Submit refill requests through [employer] or call your pharmacy and they will forward the refill request to us. Please allow 3 business days for your refill to be completed.          Additional Information About Your Visit        MyChart Information     [employer] gives you secure access to your electronic health record. If you see a primary care provider, you can also send messages to your care team and make appointments. If you have questions, please call your primary care clinic.  If you do not have a primary care provider, please call [phone] and they will assist you.        Care EveryWhere ID     This is your Care EveryWhere ID. This could be used by other organizations to access your [place] medical records  [id number]        Your Vitals Were     Pulse Temperature Height Last Period Pulse Oximetry BMI (Body Mass Index)    67 98.4  F (36.9  C) (Tympanic) 5' 3\" (1.6 m) 04/12/2017 (Approximate) 98% 25.69 kg/m2       Blood Pressure from Last " 3 Encounters:   05/25/17 112/68   04/28/17 108/64   04/27/17 105/60    Weight from Last 3 Encounters:   05/25/17 145 lb (65.8 kg)   04/28/17 145 lb (65.8 kg)   04/26/17 145 lb (65.8 kg)              Today, you had the following     No orders found for display       Primary Care Provider Office Phone # Fax #    Kaila Henry [phone]       [facility] [address] [place]  [facility] [address]        Thank you!     Thank you for choosing [facility]  for your care. Our goal is always to provide you with excellent care. Hearing back from our patients is one way we can continue to improve our services. Please take a few minutes to complete the written survey that you may receive in the mail after your visit with us. Thank you!             Your Updated Medication List - Protect others around you: Learn how to safely use, store and throw away your medicines at www.disposemymeds.org.          This list is accurate as of: 5/25/17 12:28 PM.  Always use your most recent med list.                   Brand Name Dispense Instructions for use    nicotine 14 MG/24HR 24 hr patch    NICODERM CQ    30 patch    Place 1 patch onto the skin every 24 hours

## 2017-05-25 NOTE — NURSING NOTE
"Chief Complaint   Patient presents with     Surgical Followup     post op/ vaginal hysterectomy on 4/26/17       Initial /68 (BP Location: Left arm, Cuff Size: Adult Regular)  Pulse 67  Temp 98.4  F (36.9  C) (Tympanic)  Ht 5' 3\" (1.6 m)  Wt 145 lb (65.8 kg)  LMP 04/12/2017 (Approximate)  SpO2 98%  BMI 25.69 kg/m2 Estimated body mass index is 25.69 kg/(m^2) as calculated from the following:    Height as of this encounter: 5' 3\" (1.6 m).    Weight as of this encounter: 145 lb (65.8 kg).  Medication Reconciliation: annie Dao      "

## 2017-06-14 DIAGNOSIS — Z71.6 TOBACCO ABUSE COUNSELING: Primary | ICD-10-CM

## 2017-07-05 DIAGNOSIS — Z12.31 VISIT FOR SCREENING MAMMOGRAM: Primary | ICD-10-CM

## 2017-08-22 ENCOUNTER — OFFICE VISIT (OUTPATIENT)
Dept: FAMILY MEDICINE | Facility: OTHER | Age: 47
End: 2017-08-22
Attending: NURSE PRACTITIONER
Payer: COMMERCIAL

## 2017-08-22 VITALS
WEIGHT: 150.8 LBS | DIASTOLIC BLOOD PRESSURE: 70 MMHG | OXYGEN SATURATION: 98 % | HEIGHT: 63 IN | HEART RATE: 65 BPM | RESPIRATION RATE: 16 BRPM | BODY MASS INDEX: 26.72 KG/M2 | TEMPERATURE: 98.2 F | SYSTOLIC BLOOD PRESSURE: 130 MMHG

## 2017-08-22 DIAGNOSIS — Z23 NEED FOR VACCINATION: Primary | ICD-10-CM

## 2017-08-22 DIAGNOSIS — M54.42 ACUTE MIDLINE LOW BACK PAIN WITH LEFT-SIDED SCIATICA: ICD-10-CM

## 2017-08-22 PROCEDURE — 99213 OFFICE O/P EST LOW 20 MIN: CPT | Mod: 25 | Performed by: NURSE PRACTITIONER

## 2017-08-22 PROCEDURE — 90471 IMMUNIZATION ADMIN: CPT | Performed by: NURSE PRACTITIONER

## 2017-08-22 PROCEDURE — 90715 TDAP VACCINE 7 YRS/> IM: CPT | Performed by: NURSE PRACTITIONER

## 2017-08-22 RX ORDER — PREDNISONE 20 MG/1
20 TABLET ORAL DAILY
Qty: 5 TABLET | Refills: 0 | Status: SHIPPED | OUTPATIENT
Start: 2017-08-22 | End: 2017-08-27

## 2017-08-22 RX ORDER — IBUPROFEN 800 MG/1
800 TABLET, FILM COATED ORAL 3 TIMES DAILY PRN
Qty: 90 TABLET | Refills: 1 | Status: SHIPPED | OUTPATIENT
Start: 2017-08-22 | End: 2021-11-22

## 2017-08-22 RX ORDER — CYCLOBENZAPRINE HCL 10 MG
5-10 TABLET ORAL 3 TIMES DAILY PRN
Qty: 90 TABLET | Refills: 1 | Status: SHIPPED | OUTPATIENT
Start: 2017-08-22 | End: 2018-08-08

## 2017-08-22 ASSESSMENT — ANXIETY QUESTIONNAIRES
GAD7 TOTAL SCORE: 4
5. BEING SO RESTLESS THAT IT IS HARD TO SIT STILL: SEVERAL DAYS
IF YOU CHECKED OFF ANY PROBLEMS ON THIS QUESTIONNAIRE, HOW DIFFICULT HAVE THESE PROBLEMS MADE IT FOR YOU TO DO YOUR WORK, TAKE CARE OF THINGS AT HOME, OR GET ALONG WITH OTHER PEOPLE: SOMEWHAT DIFFICULT
6. BECOMING EASILY ANNOYED OR IRRITABLE: SEVERAL DAYS
7. FEELING AFRAID AS IF SOMETHING AWFUL MIGHT HAPPEN: NOT AT ALL
3. WORRYING TOO MUCH ABOUT DIFFERENT THINGS: NOT AT ALL
1. FEELING NERVOUS, ANXIOUS, OR ON EDGE: SEVERAL DAYS
2. NOT BEING ABLE TO STOP OR CONTROL WORRYING: NOT AT ALL
4. TROUBLE RELAXING: SEVERAL DAYS

## 2017-08-22 ASSESSMENT — PAIN SCALES - GENERAL: PAINLEVEL: MODERATE PAIN (4)

## 2017-08-22 ASSESSMENT — PATIENT HEALTH QUESTIONNAIRE - PHQ9: SUM OF ALL RESPONSES TO PHQ QUESTIONS 1-9: 9

## 2017-08-22 NOTE — PROGRESS NOTES
SUBJECTIVE:   Raine Ayala is a 47 year old female who presents to clinic today for the following health issues:  Low Back Pain    Back Pain       Duration: 2 and a half weeks        Specific cause: lifting, turning/bending    Description:   Location of pain: low back bilateral, worse on left side  Character of pain: dull ache  Pain radiation:radiates into the left buttocks and hip  New numbness or weakness in legs, not attributed to pain:  no     Intensity: Currently 4/10, moderate    History:   Pain interferes with job: YES, when sitting  History of back problems: sore at times but usually gets better  Any previous MRI or X-rays: Yes- at [place].  Date 3 years ago on hip  Sees a specialist for back pain:  No  Therapies tried without relief: massage and opioids    Alleviating factors:   Improved by: nothing      Precipitating factors:  Worsened by: Lifting, Bending, Sitting and Lying Flat          Problem list and histories reviewed & adjusted, as indicated.  Additional history: as documented    Patient Active Problem List   Diagnosis     ACP (advance care planning)     Excessive or frequent menstruation     Encounter for IUD insertion     Uterine cramping     Surgery, elective     S/P vaginal hysterectomy     Past Surgical History:   Procedure Laterality Date      SECTION       COLONOSCOPY      Recall colonoscopy 5 years     COLONOSCOPY      Recall colonoscopy 5 years     colonoscopy with polypectomy repeat 2 years       CYSTOSCOPY N/A 2017    Procedure: CYSTOSCOPY;;  Surgeon: Antoni Trinh MD;  Location: [facility]     HYSTERECTOMY VAGINAL N/A 2017    Procedure: HYSTERECTOMY VAGINAL;  VAGINAL HYSTERECTOMY, CYSTOSCOPY;  Surgeon: Antoni Trinh MD;  Location: [facility]            wisdom teeth extraction         Social History   Substance Use Topics     Smoking status: Current Every Day Smoker     Packs/day: 0.50     Years: 20.00     Types: Cigarettes     Smokeless tobacco:  "Never Used      Comment: Tried to Quit (YES); Passive Exposure (NO); Longest Free (2 years)     Alcohol use Yes      Comment: Occasionally     Family History   Problem Relation Age of Onset     DIABETES Mother      C.A.D. Father      Lipids Father      Hyperlipidemia     Hypertension Father          Current Outpatient Prescriptions   Medication Sig Dispense Refill     [DISCONTINUED] NO ACTIVE MEDICATIONS        Allergies   Allergen Reactions     Codeine Other (See Comments)     \"Out of it\"     Penicillins Other (See Comments)     Can't Breathe     Recent Labs   Lab Test  04/27/17   0518  04/14/17   0951  02/21/17   0957  06/15/16   1019  05/15/15   1042  11/21/13   0803   LDL   --    --    --   106*   --   85   HDL   --    --    --   63   --   58   TRIG   --    --    --   87   --   93   ALT   --    --    --    --   24  25   CR  0.60  0.66   --   0.62  0.67  0.65   GFRESTIMATED  >90  Non  GFR Calc    >90  Non  GFR Calc     --   >90  Non  GFR Calc    >90  Non  GFR Calc    >90   GFRESTBLACK  >90   GFR Calc    >90   GFR Calc     --   >90   GFR Calc    >90   GFR Calc    >90   POTASSIUM  4.3  4.1   --   4.1  4.6  4.5   TSH   --    --   1.19  1.00  1.14  1.22      BP Readings from Last 3 Encounters:   08/22/17 130/70   05/25/17 112/68   04/28/17 108/64    Wt Readings from Last 3 Encounters:   08/22/17 150 lb 12.8 oz (68.4 kg)   05/25/17 145 lb (65.8 kg)   04/28/17 145 lb (65.8 kg)            Reviewed and updated as needed this visit by clinical staffTobacco  Allergies       Reviewed and updated as needed this visit by Provider         ROS:  Constitutional, HEENT, cardiovascular, pulmonary, gi and gu systems are negative, except as otherwise noted.      OBJECTIVE:   /70 (BP Location: Left arm, Patient Position: Chair, Cuff Size: Adult Regular)  Pulse 65  Temp 98.2  F (36.8  C) (Tympanic) " " Resp 16  Ht 5' 3\" (1.6 m)  Wt 150 lb 12.8 oz (68.4 kg)  LMP 04/12/2017 (Approximate)  SpO2 98%  BMI 26.71 kg/m2  Body mass index is 26.71 kg/(m^2).  GENERAL: healthy, alert and no distress  RESP: lungs clear to auscultation - no rales, rhonchi or wheezes  CV: regular rate and rhythm, normal S1 S2, no S3 or S4, no murmur, click or rub, no peripheral edema and peripheral pulses strong  Comprehensive back pain exam:  Tenderness of lumbar bilateral, Range of motion not limited by pain, Lower extremity strength functional and equal on both sides, Lower extremity reflexes within normal limits bilaterally, Lower extremity sensation normal and equal on both sides and Straight leg raise negative bilaterally  Is able to heel and toe walk.    PSYCH: mentation appears normal, affect normal/bright        ASSESSMENT/PLAN:       1. Need for vaccination  - TDAP VACCINE (ADACEL) [77795.002]  - 1st  Administration  [52871]    2. Acute midline low back pain with left-sided sciatica  symptomatic  - predniSONE (DELTASONE) 20 MG tablet; Take 1 tablet (20 mg) by mouth daily for 5 days  Dispense: 5 tablet; Refill: 0  - cyclobenzaprine (FLEXERIL) 10 MG tablet; Take 0.5-1 tablets (5-10 mg) by mouth 3 times daily as needed for muscle spasms  Dispense: 90 tablet; Refill: 1  - ibuprofen (ADVIL/MOTRIN) 800 MG tablet; Take 1 tablet (800 mg) by mouth 3 times daily as needed for moderate pain  Dispense: 90 tablet; Refill: 1  - ice, heat  - consider physical therapy  - may consider repeat MRI    FUTURE APPOINTMENTS:       - Follow-up visit if no improvement or any worsening    Kaila Henry, NP  [facility]"

## 2017-08-22 NOTE — MR AVS SNAPSHOT
After Visit Summary   8/22/2017    Raine Ayala    MRN: [id number]           Patient Information     Date Of Birth          1970        Visit Information        Provider Department      8/22/2017 11:00 AM Kaila Henry NP [facility]        Today's Diagnoses     Need for vaccination    -  1    Acute midline low back pain with left-sided sciatica          Care Instructions      ASSESSMENT/PLAN:       1. Need for vaccination  - TDAP VACCINE (ADACEL) [99554.002]  - 1st  Administration  [11369]    2. Acute midline low back pain with left-sided sciatica  symptomatic  - predniSONE (DELTASONE) 20 MG tablet; Take 1 tablet (20 mg) by mouth daily for 5 days  Dispense: 5 tablet; Refill: 0  - cyclobenzaprine (FLEXERIL) 10 MG tablet; Take 0.5-1 tablets (5-10 mg) by mouth 3 times daily as needed for muscle spasms  Dispense: 90 tablet; Refill: 1  - ibuprofen (ADVIL/MOTRIN) 800 MG tablet; Take 1 tablet (800 mg) by mouth 3 times daily as needed for moderate pain  Dispense: 90 tablet; Refill: 1  - ice, heat  - consider physical therapy  - may consider repeat MRI    FUTURE APPOINTMENTS:       - Follow-up visit if no improvement or any worsening    Kaila Henry NP  [facility]            Follow-ups after your visit        Who to contact     If you have questions or need follow up information about today's clinic visit or your schedule please contact [facility] directly at [phone].  Normal or non-critical lab and imaging results will be communicated to you by MyChart, letter or phone within 4 business days after the clinic has received the results. If you do not hear from us within 7 days, please contact the clinic through [employer] or phone. If you have a critical or abnormal lab result, we will notify you by phone as soon as possible.  Submit refill requests through [employer] or call your pharmacy and they will forward the refill request to us.  "Please allow 3 business days for your refill to be completed.          Additional Information About Your Visit        [employer] Information     [employer] gives you secure access to your electronic health record. If you see a primary care provider, you can also send messages to your care team and make appointments. If you have questions, please call your primary care clinic.  If you do not have a primary care provider, please call [phone] and they will assist you.        Care EveryWhere ID     This is your Care EveryWhere ID. This could be used by other organizations to access your [facility] medical records  [id number]        Your Vitals Were     Pulse Temperature Respirations Height Last Period Pulse Oximetry    65 98.2  F (36.8  C) (Tympanic) 16 5' 3\" (1.6 m) 04/12/2017 (Approximate) 98%    BMI (Body Mass Index)                   26.71 kg/m2            Blood Pressure from Last 3 Encounters:   08/22/17 130/70   05/25/17 112/68   04/28/17 108/64    Weight from Last 3 Encounters:   08/22/17 150 lb 12.8 oz (68.4 kg)   05/25/17 145 lb (65.8 kg)   04/28/17 145 lb (65.8 kg)              We Performed the Following     1st  Administration  [75917]     TDAP VACCINE (ADACEL) [71060.002]          Today's Medication Changes          These changes are accurate as of: 8/22/17 11:28 AM.  If you have any questions, ask your nurse or doctor.               Start taking these medicines.        Dose/Directions    cyclobenzaprine 10 MG tablet   Commonly known as:  FLEXERIL   Used for:  Acute midline low back pain with left-sided sciatica   Started by:  Kaila Henry NP        Dose:  5-10 mg   Take 0.5-1 tablets (5-10 mg) by mouth 3 times daily as needed for muscle spasms   Quantity:  90 tablet   Refills:  1       ibuprofen 800 MG tablet   Commonly known as:  ADVIL/MOTRIN   Used for:  Acute midline low back pain with left-sided sciatica   Started by:  Kaila Henry NP        Dose:  800 mg   Take 1 tablet (800 " mg) by mouth 3 times daily as needed for moderate pain   Quantity:  90 tablet   Refills:  1       predniSONE 20 MG tablet   Commonly known as:  DELTASONE   Used for:  Acute midline low back pain with left-sided sciatica   Started by:  Kaila Henry NP        Dose:  20 mg   Take 1 tablet (20 mg) by mouth daily for 5 days   Quantity:  5 tablet   Refills:  0            Where to get your medicines      These medications were sent to [facility] - [place], [place] - [address] [address]     Phone:  [phone]     cyclobenzaprine 10 MG tablet    ibuprofen 800 MG tablet    predniSONE 20 MG tablet                Primary Care Provider Office Phone # Fax #    Kaila Henry [phone]       [facility] [address] [place]  [facility] [address]        Equal Access to Services     FEDE OLIVEIRA : Hadii marcell mota hadasho Soomaali, waaxda luqadaha, qaybta kaalmada adeegyada, waxay layoin haydomingo dahl . So [facility] [phone].    ATENCIÓN: Si habla español, tiene a akers disposición servicios gratuitos de asistencia lingüística. Llame al [phone].    We comply with applicable federal civil rights laws and [place] laws. We do not discriminate on the basis of race, color, national origin, age, disability sex, sexual orientation or gender identity.            Thank you!     Thank you for choosing [facility]  for your care. Our goal is always to provide you with excellent care. Hearing back from our patients is one way we can continue to improve our services. Please take a few minutes to complete the written survey that you may receive in the mail after your visit with us. Thank you!             Your Updated Medication List - Protect others around you: Learn how to safely use, store and throw away your medicines at www.disposemymeds.org.          This list is accurate as of: 8/22/17 11:28 AM.  Always use your most  recent med list.                   Brand Name Dispense Instructions for use Diagnosis    cyclobenzaprine 10 MG tablet    FLEXERIL    90 tablet    Take 0.5-1 tablets (5-10 mg) by mouth 3 times daily as needed for muscle spasms    Acute midline low back pain with left-sided sciatica       ibuprofen 800 MG tablet    ADVIL/MOTRIN    90 tablet    Take 1 tablet (800 mg) by mouth 3 times daily as needed for moderate pain    Acute midline low back pain with left-sided sciatica       predniSONE 20 MG tablet    DELTASONE    5 tablet    Take 1 tablet (20 mg) by mouth daily for 5 days    Acute midline low back pain with left-sided sciatica

## 2017-08-22 NOTE — PATIENT INSTRUCTIONS
ASSESSMENT/PLAN:       1. Need for vaccination  - TDAP VACCINE (ADACEL) [30179.002]  - 1st  Administration  [11682]    2. Acute midline low back pain with left-sided sciatica  symptomatic  - predniSONE (DELTASONE) 20 MG tablet; Take 1 tablet (20 mg) by mouth daily for 5 days  Dispense: 5 tablet; Refill: 0  - cyclobenzaprine (FLEXERIL) 10 MG tablet; Take 0.5-1 tablets (5-10 mg) by mouth 3 times daily as needed for muscle spasms  Dispense: 90 tablet; Refill: 1  - ibuprofen (ADVIL/MOTRIN) 800 MG tablet; Take 1 tablet (800 mg) by mouth 3 times daily as needed for moderate pain  Dispense: 90 tablet; Refill: 1  - ice, heat  - consider physical therapy  - may consider repeat MRI    FUTURE APPOINTMENTS:       - Follow-up visit if no improvement or any worsening    Kaila Henry, NP  [facility]

## 2017-08-22 NOTE — NURSING NOTE
"Chief Complaint   Patient presents with     Back Pain     low back into hip       Initial /70 (BP Location: Left arm, Patient Position: Chair, Cuff Size: Adult Regular)  Pulse 65  Temp 98.2  F (36.8  C) (Tympanic)  Resp 16  Ht 5' 3\" (1.6 m)  Wt 150 lb 12.8 oz (68.4 kg)  LMP 04/12/2017 (Approximate)  SpO2 98%  BMI 26.71 kg/m2 Estimated body mass index is 26.71 kg/(m^2) as calculated from the following:    Height as of this encounter: 5' 3\" (1.6 m).    Weight as of this encounter: 150 lb 12.8 oz (68.4 kg).  Medication Reconciliation: complete   Pamela M Lechevalier LPN      "

## 2017-08-23 ASSESSMENT — ANXIETY QUESTIONNAIRES: GAD7 TOTAL SCORE: 4

## 2017-10-17 ENCOUNTER — TELEPHONE (OUTPATIENT)
Dept: FAMILY MEDICINE | Facility: OTHER | Age: 47
End: 2017-10-17

## 2017-10-17 DIAGNOSIS — M54.42 CHRONIC MIDLINE LOW BACK PAIN WITH BILATERAL SCIATICA: Primary | ICD-10-CM

## 2017-10-17 DIAGNOSIS — M54.41 CHRONIC MIDLINE LOW BACK PAIN WITH BILATERAL SCIATICA: Primary | ICD-10-CM

## 2017-10-17 DIAGNOSIS — G89.29 CHRONIC MIDLINE LOW BACK PAIN WITH BILATERAL SCIATICA: Primary | ICD-10-CM

## 2017-10-17 NOTE — TELEPHONE ENCOUNTER
Pt called requesting an order be put in for a MRI of her back. Pt saw Kevin in clinic on 8/22 and discussed a possible MRI. Pt was prescribed muscle relaxers and prednisone at the time. Pt does not like taking the muscle relaxers during the day due to work and she has only been taking them at night with minimal relief. Pt has an appt scheduled next week for a physical but would like to schedule a MRI now if she can.

## 2017-10-18 ENCOUNTER — TELEPHONE (OUTPATIENT)
Dept: MRI IMAGING | Facility: HOSPITAL | Age: 47
End: 2017-10-18

## 2017-10-18 DIAGNOSIS — F41.9 ANXIETY: Primary | ICD-10-CM

## 2017-10-18 RX ORDER — DIAZEPAM 10 MG
TABLET ORAL
Qty: 1 TABLET | Refills: 0 | Status: SHIPPED | OUTPATIENT
Start: 2017-10-18 | End: 2017-10-25

## 2017-10-18 NOTE — TELEPHONE ENCOUNTER
Called patient to schedule MRI of L spine. Patient is requesting medication for claustrophobia/anxiety. Patient uses [facility] in [place] and is scheduled on 10/20/17, at 9:30 Thank you.

## 2017-10-20 ENCOUNTER — HOSPITAL ENCOUNTER (OUTPATIENT)
Dept: MRI IMAGING | Facility: HOSPITAL | Age: 47
Discharge: HOME | End: 2017-10-20
Attending: NURSE PRACTITIONER | Admitting: NURSE PRACTITIONER
Payer: COMMERCIAL

## 2017-10-20 DIAGNOSIS — M54.41 CHRONIC MIDLINE LOW BACK PAIN WITH BILATERAL SCIATICA: ICD-10-CM

## 2017-10-20 DIAGNOSIS — G89.29 CHRONIC MIDLINE LOW BACK PAIN WITH BILATERAL SCIATICA: ICD-10-CM

## 2017-10-20 DIAGNOSIS — M54.42 CHRONIC MIDLINE LOW BACK PAIN WITH BILATERAL SCIATICA: ICD-10-CM

## 2017-10-20 PROCEDURE — 72148 MRI LUMBAR SPINE W/O DYE: CPT | Mod: TC

## 2017-10-25 ENCOUNTER — RADIANT APPOINTMENT (OUTPATIENT)
Dept: MAMMOGRAPHY | Facility: OTHER | Age: 47
End: 2017-10-25
Attending: NURSE PRACTITIONER
Payer: COMMERCIAL

## 2017-10-25 ENCOUNTER — OFFICE VISIT (OUTPATIENT)
Dept: FAMILY MEDICINE | Facility: OTHER | Age: 47
End: 2017-10-25
Attending: NURSE PRACTITIONER
Payer: COMMERCIAL

## 2017-10-25 VITALS
DIASTOLIC BLOOD PRESSURE: 72 MMHG | WEIGHT: 156 LBS | HEART RATE: 80 BPM | TEMPERATURE: 97.6 F | BODY MASS INDEX: 27.64 KG/M2 | SYSTOLIC BLOOD PRESSURE: 124 MMHG | HEIGHT: 63 IN | RESPIRATION RATE: 14 BRPM

## 2017-10-25 DIAGNOSIS — M47.819 ARTHRITIS OF FACET JOINTS AT MULTIPLE VERTEBRAL LEVELS: ICD-10-CM

## 2017-10-25 DIAGNOSIS — Z00.00 ROUTINE GENERAL MEDICAL EXAMINATION AT A HEALTH CARE FACILITY: Primary | ICD-10-CM

## 2017-10-25 DIAGNOSIS — Z13.6 CARDIOVASCULAR SCREENING; LDL GOAL LESS THAN 100: ICD-10-CM

## 2017-10-25 LAB
CHOLEST SERPL-MCNC: 183 MG/DL
HDLC SERPL-MCNC: 55 MG/DL
LDLC SERPL CALC-MCNC: 108 MG/DL
NONHDLC SERPL-MCNC: 128 MG/DL
TRIGL SERPL-MCNC: 101 MG/DL
TSH SERPL DL<=0.005 MIU/L-ACNC: 1 MU/L (ref 0.4–4)

## 2017-10-25 PROCEDURE — 99396 PREV VISIT EST AGE 40-64: CPT | Performed by: NURSE PRACTITIONER

## 2017-10-25 PROCEDURE — 84443 ASSAY THYROID STIM HORMONE: CPT | Performed by: NURSE PRACTITIONER

## 2017-10-25 PROCEDURE — G0202 SCR MAMMO BI INCL CAD: HCPCS | Mod: TC

## 2017-10-25 PROCEDURE — 36415 COLL VENOUS BLD VENIPUNCTURE: CPT | Performed by: NURSE PRACTITIONER

## 2017-10-25 PROCEDURE — 80061 LIPID PANEL: CPT | Performed by: NURSE PRACTITIONER

## 2017-10-25 ASSESSMENT — ANXIETY QUESTIONNAIRES
IF YOU CHECKED OFF ANY PROBLEMS ON THIS QUESTIONNAIRE, HOW DIFFICULT HAVE THESE PROBLEMS MADE IT FOR YOU TO DO YOUR WORK, TAKE CARE OF THINGS AT HOME, OR GET ALONG WITH OTHER PEOPLE: NOT DIFFICULT AT ALL
5. BEING SO RESTLESS THAT IT IS HARD TO SIT STILL: NOT AT ALL
1. FEELING NERVOUS, ANXIOUS, OR ON EDGE: NOT AT ALL
2. NOT BEING ABLE TO STOP OR CONTROL WORRYING: NOT AT ALL
7. FEELING AFRAID AS IF SOMETHING AWFUL MIGHT HAPPEN: NOT AT ALL
3. WORRYING TOO MUCH ABOUT DIFFERENT THINGS: NOT AT ALL

## 2017-10-25 ASSESSMENT — PATIENT HEALTH QUESTIONNAIRE - PHQ9
SUM OF ALL RESPONSES TO PHQ QUESTIONS 1-9: 2
5. POOR APPETITE OR OVEREATING: NOT AT ALL

## 2017-10-25 NOTE — PATIENT INSTRUCTIONS
ASSESSMENT/PLAN:   1. Routine general medical examination at a health care facility  exam  - TSH with free T4 reflex    2. Arthritis of facet joints at multiple vertebral levels (H)  conservative therapy - consider injections    3. CARDIOVASCULAR SCREENING; LDL GOAL LESS THAN 100  - Lipid Profile    Had flu vaccine earlier this fall    Try low carb, high protein diet   Preventive Health Recommendations  Female Ages 40 to 49    Yearly exam:     See your health care provider every year in order to  1. Review health changes.   2. Discuss preventive care.    3. Review your medicines if your doctor prescribed any.      Get a Pap test every three years (unless you have an abnormal result and your provider advises testing more often).      If you get Pap tests with HPV test, you only need to test every 5 years, unless you have an abnormal result. You do not need a Pap test if your uterus was removed (hysterectomy) and you have not had cancer.      You should be tested each year for STDs (sexually transmitted diseases), if you're at risk.       Ask your doctor if you should have a mammogram.      Have a colonoscopy (test for colon cancer) if someone in your family has had colon cancer or polyps before age 50.       Have a cholesterol test every 5 years.       Have a diabetes test (fasting glucose) after age 45. If you are at risk for diabetes, you should have this test every 3 years.    Shots: Get a flu shot each year. Get a tetanus shot every 10 years.     Nutrition:     Eat at least 5 servings of fruits and vegetables each day.    Eat whole-grain bread, whole-wheat pasta and brown rice instead of white grains and rice.    Talk to your provider about Calcium and Vitamin D.     Lifestyle    Exercise at least 150 minutes a week (an average of 30 minutes a day, 5 days a week). This will help you control your weight and prevent disease.    Limit alcohol to one drink per day.    No smoking.     Wear sunscreen to prevent skin  cancer.    See your dentist every six months for an exam and cleaning.

## 2017-10-25 NOTE — PROGRESS NOTES
SUBJECTIVE:   CC: Raine Ayala is an 47 year old woman who presents for preventive health visit.     Healthy Habits:    Do you get at least three servings of calcium containing foods daily (dairy, green leafy vegetables, etc.)? yes    Amount of exercise or daily activities, outside of work: No    Problems taking medications regularly No    Medication side effects: No    Have you had an eye exam in the past two years? yes    Do you see a dentist twice per year? yes    Do you have sleep apnea, excessive snoring or daytime drowsiness?no    Back pain:  Had MRI, here to review results today.        She is otherwise feeling well and has no new concerns today.      Today's PHQ-2 Score: No flowsheet data found.    PHQ-9 SCORE 6/15/2016 2017 10/25/2017   Total Score 0 9 2     ZAKIYA-7 SCORE 2017   Total Score 4           Do you feel safe in your environment - Yes    Social History   Substance Use Topics     Smoking status: Current Every Day Smoker     Packs/day: 0.50     Years: 20.00     Types: Cigarettes     Smokeless tobacco: Never Used      Comment: Tried to Quit (YES); Passive Exposure (NO); Longest Free (2 years)     Alcohol use Yes      Comment: Occasionally     Alcohol - 1-2 daily    Reviewed orders with patient.  Reviewed health maintenance and updated orders accordingly - Yes  BP Readings from Last 3 Encounters:   10/25/17 124/72   17 130/70   17 112/68    Wt Readings from Last 3 Encounters:   10/25/17 156 lb (70.8 kg)   17 150 lb 12.8 oz (68.4 kg)   17 145 lb (65.8 kg)                  Patient Active Problem List   Diagnosis     ACP (advance care planning)     Excessive or frequent menstruation     Encounter for IUD insertion     Uterine cramping     Surgery, elective     S/P vaginal hysterectomy     Past Surgical History:   Procedure Laterality Date      SECTION       COLONOSCOPY      Recall colonoscopy 5 years     COLONOSCOPY      Recall colonoscopy 5  "years     colonoscopy with polypectomy repeat 2 years       CYSTOSCOPY N/A 2017    Procedure: CYSTOSCOPY;;  Surgeon: Antoni Trinh MD;  Location: [facility]     HYSTERECTOMY VAGINAL N/A 2017    Procedure: HYSTERECTOMY VAGINAL;  VAGINAL HYSTERECTOMY, CYSTOSCOPY;  Surgeon: Antoni Trinh MD;  Location: [facility]            wisdom teeth extraction         Social History   Substance Use Topics     Smoking status: Current Every Day Smoker     Packs/day: 0.50     Years: 20.00     Types: Cigarettes     Smokeless tobacco: Never Used      Comment: Tried to Quit (YES); Passive Exposure (NO); Longest Free (2 years)     Alcohol use Yes      Comment: Occasionally     Family History   Problem Relation Age of Onset     DIABETES Mother      C.A.D. Father      Lipids Father      Hyperlipidemia     Hypertension Father          Current Outpatient Prescriptions   Medication Sig Dispense Refill     cyclobenzaprine (FLEXERIL) 10 MG tablet Take 0.5-1 tablets (5-10 mg) by mouth 3 times daily as needed for muscle spasms 90 tablet 1     ibuprofen (ADVIL/MOTRIN) 800 MG tablet Take 1 tablet (800 mg) by mouth 3 times daily as needed for moderate pain 90 tablet 1     [DISCONTINUED] NO ACTIVE MEDICATIONS        Allergies   Allergen Reactions     Codeine Other (See Comments)     \"Out of it\"     Penicillins Other (See Comments)     Can't Breathe     Recent Labs   Lab Test  17   0518  17   0951  17   0957  06/15/16   1019  05/15/15   1042  13   0803   LDL   --    --    --   106*   --   85   HDL   --    --    --   63   --   58   TRIG   --    --    --   87   --   93   ALT   --    --    --    --   24  25   CR  0.60  0.66   --   0.62  0.67  0.65   GFRESTIMATED  >90  Non  GFR Calc    >90  Non  GFR Calc     --   >90  Non  GFR Calc    >90  Non  GFR Calc    >90   GFRESTBLACK  >90   GFR Calc    >90   GFR Calc     --   " ">90   GFR Calc    >90   GFR Calc    >90   POTASSIUM  4.3  4.1   --   4.1  4.6  4.5   TSH   --    --   1.19  1.00  1.14  1.22              Mammogram scheduled for today    Pertinent mammograms are reviewed under the imaging tab.  History of abnormal Pap smear: pap earlier this year, hysterectomy earlier this year, complete    Reviewed and updated as needed this visit by clinical staffTobacco  Allergies  Meds         Reviewed and updated as needed this visit by Provider            ROS:  C: NEGATIVE for fever, chills, change in weight  I: NEGATIVE for worrisome rashes, moles or lesions  E: NEGATIVE for vision changes or irritation  ENT: NEGATIVE for ear, mouth and throat problems  R: NEGATIVE for significant cough or SOB  B: NEGATIVE for masses, tenderness or discharge  CV: NEGATIVE for chest pain, palpitations or peripheral edema  GI: NEGATIVE for nausea, abdominal pain, heartburn, or change in bowel habits  : NEGATIVE for unusual urinary or vaginal symptoms. No vaginal bleeding.  M: NEGATIVE for significant arthralgias or myalgia  N: NEGATIVE for weakness, dizziness or paresthesias  P: NEGATIVE for changes in mood or affect     OBJECTIVE:   /72 (BP Location: Left arm, Patient Position: Sitting, Cuff Size: Adult Regular)  Pulse 80  Temp 97.6  F (36.4  C) (Tympanic)  Resp 14  Ht 5' 3\" (1.6 m)  Wt 156 lb (70.8 kg)  LMP 04/12/2017 (Approximate)  BMI 27.63 kg/m2  EXAM:  GENERAL: healthy, alert and no distress  EYES: Eyes grossly normal to inspection, PERRL and conjunctivae and sclerae normal  HENT: ear canals and TM's normal, nose and mouth without ulcers or lesions  NECK: no adenopathy, no asymmetry, masses, or scars and thyroid normal to palpation  RESP: lungs clear to auscultation - no rales, rhonchi or wheezes  BREAST: normal without masses, tenderness or nipple discharge and no palpable axillary masses or adenopathy  CV: regular rate and rhythm, normal S1 S2, no S3 or " S4, no murmur, click or rub, no peripheral edema and peripheral pulses strong  ABDOMEN: soft, nontender, no hepatosplenomegaly, no masses and bowel sounds normal  MS: no gross musculoskeletal defects noted, no edema  SKIN: no suspicious lesions or rashes  PSYCH: mentation appears normal, affect normal/bright    IMAGING:  PROCEDURE: MR LUMBAR SPINE W/O CONTRAST, 10/20/2017 9:46 AM      HISTORY:Female, age 47 years, low back pain that is not improving  after treatment., Lumbago with sciatica, right side, Lumbago with  sciatica, left side, Other chronic pain     COMPARISON: MRI lumbar spine 10/1/2013     TECHNIQUE: Sagittal T1, proton density, T2 with fat saturation; axial  proton density and T2.     FINDINGS:  Curvature: The lumbar spine demonstrates normal lordotic curvature.     Conus Medullaris: Normal in contour.      Vertebral body hemangioma at T11, T12, L1 and L2 are similar in  appearance.     L1-2: Normal disc. Mild endplate and facet joint degeneration.      L2-3: Normal disc. Mild endplate and facet joint degeneration.      L3-4: Normal disc. Mild endplate and facet joint degeneration.     L4-5: Mild disc degeneration. Left foraminal annular tear. Mild  endplate and facet joint degeneration.      L5-S1: Normal disc. Minimal endplate and facet joint degeneration.     SI Joints: Congruent. Minimal degenerative changes.     Paraspinous musculature: Normal     Retroperitoneal soft tissues: Normal         IMPRESSION:   1.  Left dorsolateral/foraminal annular tear within the disc at L4-5.     2.  L4-5 interlaminar versus left L4-5 transforaminal epidural joint  injection may be of benefit.     JASON BROCK MD    ASSESSMENT/PLAN:   1. Routine general medical examination at a health care facility  exam  - TSH with free T4 reflex    2. Arthritis of facet joints at multiple vertebral levels (H)  conservative therapy - consider injections    3. CARDIOVASCULAR SCREENING; LDL GOAL LESS THAN 100  - Lipid  "Profile    Had flu vaccine earlier this fall    COUNSELING:   Reviewed preventive health counseling, as reflected in patient instructions       Regular exercise       Healthy diet/nutrition       Vision screening       Hearing screening       Immunizations    Has already had flu vaccine this fall               reports that she has been smoking Cigarettes.  She has a 10.00 pack-year smoking history. She has never used smokeless tobacco.    Estimated body mass index is 27.63 kg/(m^2) as calculated from the following:    Height as of this encounter: 5' 3\" (1.6 m).    Weight as of this encounter: 156 lb (70.8 kg).   Weight management plan: Discussed healthy diet and exercise guidelines and patient will follow up in 12 months in clinic to re-evaluate.    Counseling Resources:  ATP IV Guidelines  Pooled Cohorts Equation Calculator  Breast Cancer Risk Calculator  FRAX Risk Assessment  ICSI Preventive Guidelines  Dietary Guidelines for Americans, 2010  USDA's MyPlate  ASA Prophylaxis  Lung CA Screening    Kaila Henry NP  [facility]  "

## 2017-10-25 NOTE — MR AVS SNAPSHOT
After Visit Summary   10/25/2017    Raine Ayala    MRN: [id number]           Patient Information     Date Of Birth          1970        Visit Information        Provider Department      10/25/2017 9:00 AM Kaila Henry NP [facility]        Today's Diagnoses     Routine general medical examination at a health care facility    -  1    Arthritis of facet joints at multiple vertebral levels (H)        CARDIOVASCULAR SCREENING; LDL GOAL LESS THAN 100          Care Instructions      ASSESSMENT/PLAN:   1. Routine general medical examination at a health care facility  exam  - TSH with free T4 reflex    2. Arthritis of facet joints at multiple vertebral levels (H)  conservative therapy - consider injections    3. CARDIOVASCULAR SCREENING; LDL GOAL LESS THAN 100  - Lipid Profile    Had flu vaccine earlier this fall    Try low carb, high protein diet   Preventive Health Recommendations  Female Ages 40 to 49    Yearly exam:     See your health care provider every year in order to  1. Review health changes.   2. Discuss preventive care.    3. Review your medicines if your doctor prescribed any.      Get a Pap test every three years (unless you have an abnormal result and your provider advises testing more often).      If you get Pap tests with HPV test, you only need to test every 5 years, unless you have an abnormal result. You do not need a Pap test if your uterus was removed (hysterectomy) and you have not had cancer.      You should be tested each year for STDs (sexually transmitted diseases), if you're at risk.       Ask your doctor if you should have a mammogram.      Have a colonoscopy (test for colon cancer) if someone in your family has had colon cancer or polyps before age 50.       Have a cholesterol test every 5 years.       Have a diabetes test (fasting glucose) after age 45. If you are at risk for diabetes, you should have this test every 3 years.    Shots: Get a  flu shot each year. Get a tetanus shot every 10 years.     Nutrition:     Eat at least 5 servings of fruits and vegetables each day.    Eat whole-grain bread, whole-wheat pasta and brown rice instead of white grains and rice.    Talk to your provider about Calcium and Vitamin D.     Lifestyle    Exercise at least 150 minutes a week (an average of 30 minutes a day, 5 days a week). This will help you control your weight and prevent disease.    Limit alcohol to one drink per day.    No smoking.     Wear sunscreen to prevent skin cancer.    See your dentist every six months for an exam and cleaning.          Follow-ups after your visit        Who to contact     If you have questions or need follow up information about today's clinic visit or your schedule please contact [facility] directly at [phone].  Normal or non-critical lab and imaging results will be communicated to you by MyChart, letter or phone within 4 business days after the clinic has received the results. If you do not hear from us within 7 days, please contact the clinic through [employer] or phone. If you have a critical or abnormal lab result, we will notify you by phone as soon as possible.  Submit refill requests through [employer] or call your pharmacy and they will forward the refill request to us. Please allow 3 business days for your refill to be completed.          Additional Information About Your Visit        [employer] Android POS Information     [employer] gives you secure access to your electronic health record. If you see a primary care provider, you can also send messages to your care team and make appointments. If you have questions, please call your primary care clinic.  If you do not have a primary care provider, please call [phone] and they will assist you.        Care EveryWhere ID     This is your Care EveryWhere ID. This could be used by other organizations to access your [facility] medical records  [id number]        Your Vitals Were  "    Pulse Temperature Respirations Height Last Period BMI (Body Mass Index)    80 97.6  F (36.4  C) (Tympanic) 14 5' 3\" (1.6 m) 04/12/2017 (Approximate) 27.63 kg/m2       Blood Pressure from Last 3 Encounters:   10/25/17 124/72   08/22/17 130/70   05/25/17 112/68    Weight from Last 3 Encounters:   10/25/17 156 lb (70.8 kg)   08/22/17 150 lb 12.8 oz (68.4 kg)   05/25/17 145 lb (65.8 kg)              We Performed the Following     Lipid Profile     TSH with free T4 reflex        Primary Care Provider Office Phone # Fax #    Kaila JONAS Henry [phone]       [facility] [address]  [place] [address]        Equal Access to Services     FEDE OLIVEIRA : Hadii marcell mota hadasho Soomaali, waaxda luqadaha, qaybta kaalmada adeegyada, miguel vidales haydomingo dahl . So [facility] [phone].    ATENCIÓN: Si habla español, tiene a akers disposición servicios gratuitos de asistencia lingüística. Llame al [phone].    We comply with applicable federal civil rights laws and [place] laws. We do not discriminate on the basis of race, color, national origin, age, disability, sex, sexual orientation, or gender identity.            Thank you!     Thank you for choosing [facility]  for your care. Our goal is always to provide you with excellent care. Hearing back from our patients is one way we can continue to improve our services. Please take a few minutes to complete the written survey that you may receive in the mail after your visit with us. Thank you!             Your Updated Medication List - Protect others around you: Learn how to safely use, store and throw away your medicines at www.disposemymeds.org.          This list is accurate as of: 10/25/17 10:02 AM.  Always use your most recent med list.                   Brand Name Dispense Instructions for use Diagnosis    cyclobenzaprine 10 MG tablet    FLEXERIL    90 tablet    Take 0.5-1 tablets (5-10 mg) by mouth " 3 times daily as needed for muscle spasms    Acute midline low back pain with left-sided sciatica       ibuprofen 800 MG tablet    ADVIL/MOTRIN    90 tablet    Take 1 tablet (800 mg) by mouth 3 times daily as needed for moderate pain    Acute midline low back pain with left-sided sciatica

## 2017-10-25 NOTE — NURSING NOTE
"Chief Complaint   Patient presents with     Physical     Patient is fasting and would like MRI results.       Initial /72 (BP Location: Left arm, Patient Position: Sitting, Cuff Size: Adult Regular)  Pulse 80  Temp 97.6  F (36.4  C) (Tympanic)  Resp 14  Ht 5' 3\" (1.6 m)  Wt 156 lb (70.8 kg)  LMP 04/12/2017 (Approximate)  BMI 27.63 kg/m2 Estimated body mass index is 27.63 kg/(m^2) as calculated from the following:    Height as of this encounter: 5' 3\" (1.6 m).    Weight as of this encounter: 156 lb (70.8 kg).  Medication Reconciliation: complete   Zabrina Rubio      "

## 2017-11-28 ENCOUNTER — TELEPHONE (OUTPATIENT)
Dept: FAMILY MEDICINE | Facility: OTHER | Age: 47
End: 2017-11-28

## 2017-11-28 DIAGNOSIS — J06.9 VIRAL URI WITH COUGH: ICD-10-CM

## 2017-11-28 RX ORDER — ALBUTEROL SULFATE 90 UG/1
2 AEROSOL, METERED RESPIRATORY (INHALATION) EVERY 4 HOURS PRN
Qty: 1 INHALER | Refills: 1 | Status: SHIPPED | OUTPATIENT
Start: 2017-11-28 | End: 2020-10-01

## 2017-11-28 NOTE — TELEPHONE ENCOUNTER
Patient calling back in regards to inhaler and needs the refill for the ventolin HFA and to please send it to [facility].

## 2017-11-28 NOTE — TELEPHONE ENCOUNTER
11:10 AM    Reason for Call: Phone Call    Description: Elena needs her inhaler prescription filled and needs to change pharmacy for it. Please call Raine    Was an appointment offered for this call?  No    Preferred method for responding to this message: [phone]    If we cannot reach you directly, may we leave a detailed response at the number you provided?  Yes    Can this message wait until your PCP/provider returns, if available today?   Yes    Tracey Orozco

## 2018-02-26 ENCOUNTER — TELEPHONE (OUTPATIENT)
Dept: FAMILY MEDICINE | Facility: OTHER | Age: 48
End: 2018-02-26

## 2018-02-26 NOTE — TELEPHONE ENCOUNTER
Raine states she has UTI for over a week and would like an antibiotic if possible.  She is not wanting to come in for an office call.  Please call her at [phone].

## 2018-02-26 NOTE — TELEPHONE ENCOUNTER
"Updated on below and she said ,\"no too many medical bills,will keep drinking water and eating yogurt\".Encouraged to call for appointment.  "

## 2018-08-08 ENCOUNTER — HOSPITAL ENCOUNTER (EMERGENCY)
Facility: HOSPITAL | Age: 48
Discharge: HOME | End: 2018-08-08
Attending: FAMILY MEDICINE | Admitting: FAMILY MEDICINE
Payer: COMMERCIAL

## 2018-08-08 ENCOUNTER — TELEPHONE (OUTPATIENT)
Dept: FAMILY MEDICINE | Facility: OTHER | Age: 48
End: 2018-08-08

## 2018-08-08 ENCOUNTER — APPOINTMENT (OUTPATIENT)
Dept: GENERAL RADIOLOGY | Facility: HOSPITAL | Age: 48
End: 2018-08-08
Attending: FAMILY MEDICINE
Payer: COMMERCIAL

## 2018-08-08 VITALS
SYSTOLIC BLOOD PRESSURE: 129 MMHG | RESPIRATION RATE: 16 BRPM | OXYGEN SATURATION: 98 % | HEART RATE: 68 BPM | DIASTOLIC BLOOD PRESSURE: 81 MMHG | TEMPERATURE: 98.7 F

## 2018-08-08 DIAGNOSIS — R07.89 NON-CARDIAC CHEST PAIN: ICD-10-CM

## 2018-08-08 LAB
ALBUMIN SERPL-MCNC: 3.8 G/DL (ref 3.4–5)
ALP SERPL-CCNC: 48 U/L (ref 40–150)
ALT SERPL W P-5'-P-CCNC: 33 U/L (ref 0–50)
ANION GAP SERPL CALCULATED.3IONS-SCNC: 7 MMOL/L (ref 3–14)
AST SERPL W P-5'-P-CCNC: 24 U/L (ref 0–45)
BASOPHILS # BLD AUTO: 0.1 10E9/L (ref 0–0.2)
BASOPHILS NFR BLD AUTO: 0.7 %
BILIRUB SERPL-MCNC: 0.4 MG/DL (ref 0.2–1.3)
BUN SERPL-MCNC: 11 MG/DL (ref 7–30)
CALCIUM SERPL-MCNC: 8.4 MG/DL (ref 8.5–10.1)
CHLORIDE SERPL-SCNC: 107 MMOL/L (ref 94–109)
CO2 SERPL-SCNC: 24 MMOL/L (ref 20–32)
CREAT SERPL-MCNC: 0.66 MG/DL (ref 0.52–1.04)
D DIMER PPP DDU-MCNC: 260 NG/ML D-DU (ref 0–300)
DIFFERENTIAL METHOD BLD: ABNORMAL
EOSINOPHIL # BLD AUTO: 0.1 10E9/L (ref 0–0.7)
EOSINOPHIL NFR BLD AUTO: 1 %
ERYTHROCYTE [DISTWIDTH] IN BLOOD BY AUTOMATED COUNT: 12.5 % (ref 10–15)
GFR SERPL CREATININE-BSD FRML MDRD: >90 ML/MIN/1.7M2
GLUCOSE SERPL-MCNC: 106 MG/DL (ref 70–99)
HCT VFR BLD AUTO: 39.5 % (ref 35–47)
HGB BLD-MCNC: 13.8 G/DL (ref 11.7–15.7)
IMM GRANULOCYTES # BLD: 0 10E9/L (ref 0–0.4)
IMM GRANULOCYTES NFR BLD: 0.3 %
LYMPHOCYTES # BLD AUTO: 1.5 10E9/L (ref 0.8–5.3)
LYMPHOCYTES NFR BLD AUTO: 22.2 %
MCH RBC QN AUTO: 33.3 PG (ref 26.5–33)
MCHC RBC AUTO-ENTMCNC: 34.9 G/DL (ref 31.5–36.5)
MCV RBC AUTO: 95 FL (ref 78–100)
MONOCYTES # BLD AUTO: 0.7 10E9/L (ref 0–1.3)
MONOCYTES NFR BLD AUTO: 10.6 %
NEUTROPHILS # BLD AUTO: 4.3 10E9/L (ref 1.6–8.3)
NEUTROPHILS NFR BLD AUTO: 65.2 %
NRBC # BLD AUTO: 0 10*3/UL
NRBC BLD AUTO-RTO: 0 /100
NT-PROBNP SERPL-MCNC: 38 PG/ML (ref 0–450)
PLATELET # BLD AUTO: 189 10E9/L (ref 150–450)
POTASSIUM SERPL-SCNC: 4.5 MMOL/L (ref 3.4–5.3)
PROT SERPL-MCNC: 7.4 G/DL (ref 6.8–8.8)
RBC # BLD AUTO: 4.15 10E12/L (ref 3.8–5.2)
SODIUM SERPL-SCNC: 138 MMOL/L (ref 133–144)
TROPONIN I SERPL-MCNC: <0.015 UG/L (ref 0–0.04)
WBC # BLD AUTO: 6.7 10E9/L (ref 4–11)

## 2018-08-08 PROCEDURE — 83880 ASSAY OF NATRIURETIC PEPTIDE: CPT | Performed by: FAMILY MEDICINE

## 2018-08-08 PROCEDURE — 85379 FIBRIN DEGRADATION QUANT: CPT | Performed by: FAMILY MEDICINE

## 2018-08-08 PROCEDURE — 96372 THER/PROPH/DIAG INJ SC/IM: CPT

## 2018-08-08 PROCEDURE — 71046 X-RAY EXAM CHEST 2 VIEWS: CPT | Mod: TC

## 2018-08-08 PROCEDURE — 85025 COMPLETE CBC W/AUTO DIFF WBC: CPT | Performed by: FAMILY MEDICINE

## 2018-08-08 PROCEDURE — 93010 ELECTROCARDIOGRAM REPORT: CPT | Performed by: INTERNAL MEDICINE

## 2018-08-08 PROCEDURE — 99285 EMERGENCY DEPT VISIT HI MDM: CPT | Mod: 25

## 2018-08-08 PROCEDURE — 25000128 H RX IP 250 OP 636: Performed by: FAMILY MEDICINE

## 2018-08-08 PROCEDURE — 94640 AIRWAY INHALATION TREATMENT: CPT

## 2018-08-08 PROCEDURE — 36415 COLL VENOUS BLD VENIPUNCTURE: CPT | Performed by: FAMILY MEDICINE

## 2018-08-08 PROCEDURE — 80053 COMPREHEN METABOLIC PANEL: CPT | Performed by: FAMILY MEDICINE

## 2018-08-08 PROCEDURE — 25000125 ZZHC RX 250: Performed by: FAMILY MEDICINE

## 2018-08-08 PROCEDURE — 99284 EMERGENCY DEPT VISIT MOD MDM: CPT | Performed by: FAMILY MEDICINE

## 2018-08-08 PROCEDURE — 93005 ELECTROCARDIOGRAM TRACING: CPT

## 2018-08-08 PROCEDURE — 84484 ASSAY OF TROPONIN QUANT: CPT | Performed by: FAMILY MEDICINE

## 2018-08-08 RX ORDER — METHYLPREDNISOLONE SODIUM SUCCINATE 125 MG/2ML
125 INJECTION, POWDER, LYOPHILIZED, FOR SOLUTION INTRAMUSCULAR; INTRAVENOUS ONCE
Status: COMPLETED | OUTPATIENT
Start: 2018-08-08 | End: 2018-08-08

## 2018-08-08 RX ORDER — IPRATROPIUM BROMIDE AND ALBUTEROL SULFATE 2.5; .5 MG/3ML; MG/3ML
3 SOLUTION RESPIRATORY (INHALATION) ONCE
Status: COMPLETED | OUTPATIENT
Start: 2018-08-08 | End: 2018-08-08

## 2018-08-08 RX ADMIN — IPRATROPIUM BROMIDE AND ALBUTEROL SULFATE 3 ML: .5; 3 SOLUTION RESPIRATORY (INHALATION) at 13:25

## 2018-08-08 RX ADMIN — METHYLPREDNISOLONE SODIUM SUCCINATE 125 MG: 125 INJECTION, POWDER, FOR SOLUTION INTRAMUSCULAR; INTRAVENOUS at 14:09

## 2018-08-08 ASSESSMENT — ENCOUNTER SYMPTOMS
FATIGUE: 0
NAUSEA: 0
DYSURIA: 0
VOMITING: 0
SHORTNESS OF BREATH: 1
FEVER: 0
DIARRHEA: 0
MUSCULOSKELETAL NEGATIVE: 1
ACTIVITY CHANGE: 1
DIAPHORESIS: 0
NEUROLOGICAL NEGATIVE: 1
NERVOUS/ANXIOUS: 1
COUGH: 0
WHEEZING: 0
CONSTIPATION: 0
ABDOMINAL PAIN: 0

## 2018-08-08 NOTE — ED AVS SNAPSHOT
[facility]    [address] [address]    Phone:  [phone]                                       Raine Ayala   MRN: [id number]    Department:  [facility]   Date of Visit:  8/8/2018           Patient Information     Date Of Birth          1970        Your diagnoses for this visit were:     Non-cardiac chest pain        You were seen by Trinh Dong MD.      Follow-up Information     Follow up with Kaila Henry NP In 2 days.    Specialty:  Family Practice    Why:  Follow up ED visit - 9:15 AM    Contact information:    [address] [facility] [address]  [phone]          Discharge Instructions         *CHEST PAIN, NONCARDIAC    Based on your visit today, the exact cause of your chest pain is not certain. Your condition does not seem serious and your pain does not appear to be coming from your heart. However, sometimes the signs of a serious problem take more time to appear. Therefore, please watch for the warning signs listed below.  HOME CARE:  1. Rest today and avoid strenuous activity.  2. Take any prescribed medicine as directed.  FOLLOW UP with your doctor in 1-3 days.   GET PROMPT MEDICAL ATTENTION if any of the following occur:    A change in the type of pain: if it feels different, becomes more severe, lasts longer, or begins to spread into your shoulder, arm, neck, jaw or back    Shortness of breath or increased pain with breathing    Cough with blood or dark colored sputum (phlegm)    Weakness, dizziness, or fainting    Fever over 101  F (38.3  C)    Swelling, pain or redness in one leg    4097-9138 [employer]. [address] [place] [address]. All rights reserved. This information is not intended as a substitute for professional medical care. Always follow your healthcare professional's instructions.  This information has been modified by your health care provider with  permission from the publisher.      Discharge References/Attachments     SHORTNESS OF BREATH (DYSPNEA) (ENGLISH)      Your next 10 appointments already scheduled     Aug 10, 2018  9:30 AM CDT   (Arrive by 9:15 AM)   SHORT with Kaila Henry NP   [facility] ([facility] )    [address]  [place] [address]   [phone]            Aug 15, 2018  9:00 AM CDT   (Arrive by 8:45 AM)   Office Visit with Kaila Henry NP   [facility] ([facility] )    [address]  [place] [address]   [phone]           Bring a current list of meds and any records pertaining to this visit.  For Physicals, please bring immunization records and any forms needing to be filled out.  Please arrive 15 minutes early to complete paperwork and register.                 Review of your medicines      Our records show that you are taking the medicines listed below. If these are incorrect, please call your family doctor or clinic.        Dose / Directions Last dose taken    albuterol 108 (90 Base) MCG/ACT Inhaler   Commonly known as:  PROAIR HFA/PROVENTIL HFA/VENTOLIN HFA   Dose:  2 puff   Quantity:  1 Inhaler        Inhale 2 puffs into the lungs every 4 hours as needed for shortness of breath / dyspnea or wheezing   Refills:  1        ibuprofen 800 MG tablet   Commonly known as:  ADVIL/MOTRIN   Dose:  800 mg   Quantity:  90 tablet        Take 1 tablet (800 mg) by mouth 3 times daily as needed for moderate pain   Refills:  1                Procedures and tests performed during your visit     CBC with platelets differential    Comprehensive metabolic panel    D-Dimer ([facility])    EKG 12-lead, tracing only    Nt probnp inpatient    Pulse oximetry nursing    Troponin I    Vital signs    XR Chest 2 Views      Orders Needing Specimen Collection     None      Pending Results     No orders found from 8/6/2018 to 8/9/2018.             Pending Culture Results     No orders found from 8/6/2018 to 8/9/2018.            Thank you for choosing [facility]       Thank you for choosing [facility] for your care. Our goal is always to provide you with excellent care. Hearing back from our patients is one way we can continue to improve our services. Please take a few minutes to complete the written survey that you may receive in the mail after you visit with us. Thank you!        "[employer] Information     [employer] gives you secure access to your electronic health record. If you see a primary care provider, you can also send messages to your care team and make appointments. If you have questions, please call your primary care clinic.  If you do not have a primary care provider, please call [phone] and they will assist you.        Care EveryWhere ID     This is your Care EveryWhere ID. This could be used by other organizations to access your [facility] medical records  [id number]        Equal Access to Services     DIVINA OLIVEIRA : Justen Shepherd, oralia koenig, miguel donald . So [facility] [phone].    ATENCIÓN: Si habla español, tiene a akers disposición servicios gratuitos de asistencia lingüística. Llame al [phone].    We comply with applicable federal civil rights laws and [place] laws. We do not discriminate on the basis of race, color, national origin, age, disability, sex, sexual orientation, or gender identity.            After Visit Summary       This is your record. Keep this with you and show to your community pharmacist(s) and doctor(s) at your next visit.

## 2018-08-08 NOTE — PATIENT INSTRUCTIONS
ASSESSMENT/PLAN:       1. Dyspnea on exertion  - General PFT Lab (Please always keep checked); Future  - Pulmonary Function Test; Future  - General PFT Lab (Please always keep checked)    2. Family history of early CAD  Reviewed risk factors, may consider cardiac stress test  Start 81mg aspirin daily    3. Gastroesophageal reflux disease, esophagitis presence not specified  symptomatic  - omeprazole (PRILOSEC) 20 MG CR capsule; Take 1 capsule (20 mg) by mouth daily  Dispense: 90 capsule; Refill: 3        FUTURE APPOINTMENTS:       - Follow-up visit as needed     Kaila Henry, NP  [facility]

## 2018-08-08 NOTE — TELEPHONE ENCOUNTER
9:12 AM    Reason for Call: OVERBOOK    Patient is having the following symptoms: shortness of breath and not feeling well for 11 months.    The patient is requesting an appointment for 08/08/2018 with Kevin Castaneda.    Was an appointment offered for this call? No  If yes : Appointment type              Date    Preferred method for responding to this message: Telephone Call  What is your phone number ?  [phone]  If we cannot reach you directly, may we leave a detailed response at the number you provided? Yes    Can this message wait until your PCP/provider returns, if unavailable today? No,     Chey Mahajan

## 2018-08-08 NOTE — PROGRESS NOTES
SUBJECTIVE:   Raine Ayala is a 48 year old female who presents to clinic today for the following health issues:      ED/UC Followup:    Facility:  [facility] [place]  Date of visit: 18  Reason for visit: Chest Pain non cardiac   Current Status: Still feeling SOB everyday , doesn't feel that the inhaler is working, tried the nebulizer in the ER and felt that did not help as well     She has just felt off.  She found out that several members of her family have had an MI prior to age 50; she is worried about her risk.  She does smoke, blood pressure and weight are normal.  She does not take an aspirin.  Lipids: The 10-year ASCVD risk score (Ashleyalejandro KINGSLEY Jr, et al., 2013) is: 2%    Values used to calculate the score:      Age: 48 years      Sex: Female      Is Non- : No      Diabetic: No      Tobacco smoker: Yes      Systolic Blood Pressure: 115 mmHg      Is BP treated: No      HDL Cholesterol: 55 mg/dL      Total Cholesterol: 183 mg/dL    Her over all risk is low, but for genetic risk.      She denies chest pain, just wheezing and shortness of breath.  The work-up completed at the ED is negative for a cardiac issue; PFT's were recommended.      She does report worsening heartburn, takes TUMS with some relief.  She does watch diet and avoid tomato based food as they will exacerbate her symptoms.      Problem list and histories reviewed & adjusted, as indicated.  Additional history: as documented    Patient Active Problem List   Diagnosis     ACP (advance care planning)     Excessive or frequent menstruation     Encounter for IUD insertion     Uterine cramping     Surgery, elective     S/P vaginal hysterectomy     Past Surgical History:   Procedure Laterality Date      SECTION       COLONOSCOPY      Recall colonoscopy 5 years     COLONOSCOPY      Recall colonoscopy 5 years     colonoscopy with polypectomy repeat 2 years  2007     CYSTOSCOPY N/A 2017    Procedure: CYSTOSCOPY;;   "Surgeon: Antoni Trinh MD;  Location: [facility]     HYSTERECTOMY VAGINAL N/A 2017    Procedure: HYSTERECTOMY VAGINAL;  VAGINAL HYSTERECTOMY, CYSTOSCOPY;  Surgeon: Antoni Trinh MD;  Location: [facility] OR            wisdom teeth extraction         Social History   Substance Use Topics     Smoking status: Current Every Day Smoker     Packs/day: 0.50     Years: 20.00     Types: Cigarettes     Smokeless tobacco: Never Used      Comment: Tried to Quit (YES); Passive Exposure (NO); Longest Free (2 years)     Alcohol use Yes      Comment: Occasionally     Family History   Problem Relation Age of Onset     Diabetes Mother      C.A.D. Father      Lipids Father      Hyperlipidemia     Hypertension Father          Current Outpatient Prescriptions   Medication Sig Dispense Refill     albuterol (PROAIR HFA/PROVENTIL HFA/VENTOLIN HFA) 108 (90 BASE) MCG/ACT Inhaler Inhale 2 puffs into the lungs every 4 hours as needed for shortness of breath / dyspnea or wheezing 1 Inhaler 1     ibuprofen (ADVIL/MOTRIN) 800 MG tablet Take 1 tablet (800 mg) by mouth 3 times daily as needed for moderate pain 90 tablet 1     [DISCONTINUED] NO ACTIVE MEDICATIONS        Allergies   Allergen Reactions     Codeine Other (See Comments)     \"Out of it\"     Penicillins Other (See Comments)     Can't Breathe     Recent Labs   Lab Test  18   1204  10/25/17   1002  17   0518   17   0957  06/15/16   1019  05/15/15   1042  13   0803   LDL   --   108*   --    --    --   106*   --   85   HDL   --   55   --    --    --   63   --   58   TRIG   --   101   --    --    --   87   --   93   ALT  33   --    --    --    --    --   24  25   CR  0.66   --   0.60   < >   --   0.62  0.67  0.65   GFRESTIMATED  >90   --   >90  Non  GFR Calc     < >   --   >90  Non  GFR Calc    >90  Non  GFR Calc    >90   GFRESTBLACK  >90   --   >90   GFR Calc     < >   --   >90   GFR " Calc    >90   GFR Calc    >90   POTASSIUM  4.5   --   4.3   < >   --   4.1  4.6  4.5   TSH   --   1.00   --    --   1.19  1.00  1.14  1.22    < > = values in this interval not displayed.      BP Readings from Last 3 Encounters:   08/10/18 115/78   08/08/18 129/81   10/25/17 124/72    Wt Readings from Last 3 Encounters:   08/10/18 160 lb 6.4 oz (72.8 kg)   10/25/17 156 lb (70.8 kg)   08/22/17 150 lb 12.8 oz (68.4 kg)                    Reviewed and updated as needed this visit by clinical staff       Reviewed and updated as needed this visit by Provider      The 10-year ASCVD risk score (Ashleyalejandro KINGSLEY Jr, et al., 2013) is: 2%    Values used to calculate the score:      Age: 48 years      Sex: Female      Is Non- : No      Diabetic: No      Tobacco smoker: Yes      Systolic Blood Pressure: 115 mmHg      Is BP treated: No      HDL Cholesterol: 55 mg/dL      Total Cholesterol: 183 mg/dL      ROS:  Constitutional, HEENT, cardiovascular, pulmonary, gi and gu systems are negative, except as otherwise noted.    OBJECTIVE:     /78 (BP Location: Left arm, Patient Position: Chair, Cuff Size: Adult Regular)  Pulse 65  Temp 98.9  F (37.2  C) (Tympanic)  Wt 160 lb 6.4 oz (72.8 kg)  LMP 04/12/2017 (Approximate)  SpO2 96%  BMI 28.41 kg/m2  Body mass index is 28.41 kg/(m^2).  GENERAL: healthy, alert and no distress  HENT: ear canals and TM's normal, nose and mouth without ulcers or lesions  NECK: no adenopathy, no asymmetry, masses, or scars, thyroid normal to palpation and no carotid bruits  RESP: lungs clear to auscultation - no rales, rhonchi or wheezes  CV: regular rate and rhythm, normal S1 S2, no S3 or S4, no murmur, click or rub, no peripheral edema and peripheral pulses strong  MS: no gross musculoskeletal defects noted, no edema  NEURO: Normal strength and tone, mentation intact and speech normal  PSYCH: mentation appears normal, affect normal/bright  Results for orders placed  or performed during the hospital encounter of 08/08/18   XR Chest 2 Views    Narrative    XR CHEST 2 VW    HISTORY: 48 years Female Shortness of breath;     COMPARISON: 4/14/2017    TECHNIQUE: 2 views of the chest were obtained.    FINDINGS: Two views of the chest were obtained. Heart size and  pulmonary vascularity are within normal limits, lungs are clear both  views. No consolidating air space opacities are present.          Impression    IMPRESSION: Clear chest.    SHANTE MCPHERSON MD   CBC with platelets differential   Result Value Ref Range    WBC 6.7 4.0 - 11.0 10e9/L    RBC Count 4.15 3.8 - 5.2 10e12/L    Hemoglobin 13.8 11.7 - 15.7 g/dL    Hematocrit 39.5 35.0 - 47.0 %    MCV 95 78 - 100 fl    MCH 33.3 (H) 26.5 - 33.0 pg    MCHC 34.9 31.5 - 36.5 g/dL    RDW 12.5 10.0 - 15.0 %    Platelet Count 189 150 - 450 10e9/L    Diff Method Automated Method     % Neutrophils 65.2 %    % Lymphocytes 22.2 %    % Monocytes 10.6 %    % Eosinophils 1.0 %    % Basophils 0.7 %    % Immature Granulocytes 0.3 %    Nucleated RBCs 0 0 /100    Absolute Neutrophil 4.3 1.6 - 8.3 10e9/L    Absolute Lymphocytes 1.5 0.8 - 5.3 10e9/L    Absolute Monocytes 0.7 0.0 - 1.3 10e9/L    Absolute Eosinophils 0.1 0.0 - 0.7 10e9/L    Absolute Basophils 0.1 0.0 - 0.2 10e9/L    Abs Immature Granulocytes 0.0 0 - 0.4 10e9/L    Absolute Nucleated RBC 0.0    Comprehensive metabolic panel   Result Value Ref Range    Sodium 138 133 - 144 mmol/L    Potassium 4.5 3.4 - 5.3 mmol/L    Chloride 107 94 - 109 mmol/L    Carbon Dioxide 24 20 - 32 mmol/L    Anion Gap 7 3 - 14 mmol/L    Glucose 106 (H) 70 - 99 mg/dL    Urea Nitrogen 11 7 - 30 mg/dL    Creatinine 0.66 0.52 - 1.04 mg/dL    GFR Estimate >90 >60 mL/min/1.7m2    GFR Estimate If Black >90 >60 mL/min/1.7m2    Calcium 8.4 (L) 8.5 - 10.1 mg/dL    Bilirubin Total 0.4 0.2 - 1.3 mg/dL    Albumin 3.8 3.4 - 5.0 g/dL    Protein Total 7.4 6.8 - 8.8 g/dL    Alkaline Phosphatase 48 40 - 150 U/L    ALT 33 0 - 50 U/L     AST 24 0 - 45 U/L   Troponin I   Result Value Ref Range    Troponin I ES <0.015 0.000 - 0.045 ug/L   Nt probnp inpatient   Result Value Ref Range    N-Terminal Pro BNP Inpatient 38 0 - 450 pg/mL   D-Dimer ([facility])   Result Value Ref Range    D-Dimer ng/mL 260 0 - 300 ng/ml D-DU         ASSESSMENT/PLAN:       1. Dyspnea on exertion  - General PFT Lab (Please always keep checked); Future  - Pulmonary Function Test; Future  - General PFT Lab (Please always keep checked)    2. Family history of early CAD  Reviewed risk factors, may consider cardiac stress test  Start 81mg aspirin daily    3. Gastroesophageal reflux disease, esophagitis presence not specified  Symptomatic - discussed this could be the cause of her symptoms.  Will obtain PFT's to rule out underlying lung disease.  Will start omeprazole as a trial to see if symptoms improve.    - omeprazole (PRILOSEC) 20 MG CR capsule; Take 1 capsule (20 mg) by mouth daily  Dispense: 90 capsule; Refill: 3        FUTURE APPOINTMENTS:       - Follow-up visit as needed     Kaila Henry, NP  [facility]

## 2018-08-08 NOTE — TELEPHONE ENCOUNTER
Talked to pt and she states feels weird today short of breath noted pain in upper shoulder blades and a little nausea just doesn't feel right tired and no energy noted to go to ER for evaluation and keep f/u appointment for next week  Pamela M Lechevalier LPN

## 2018-08-08 NOTE — ED AVS SNAPSHOT
[facility]    [address] [address]    Phone:  [phone]                                       Raine Ayala   MRN: [id number]    Department:  [facility]   Date of Visit:  8/8/2018           After Visit Summary Signature Page     I have received my discharge instructions, and my questions have been answered. I have discussed any challenges I see with this plan with the nurse or doctor.    ..........................................................................................................................................  Patient/Patient Representative Signature      ..........................................................................................................................................  Patient Representative Print Name and Relationship to Patient    ..................................................               ................................................  Date                                            Time    ..........................................................................................................................................  Reviewed by Signature/Title    ...................................................              ..............................................  Date                                                            Time

## 2018-08-08 NOTE — ED PROVIDER NOTES
"  History     Chief Complaint   Patient presents with     Chest Pain     felt like knives in her heart, couldnt take deep breaths, and arms were tingly. pt has been SOB for last 6 weeks. pain 2/10 now and was 7/10 earlier today     HPI  Raine Ayala is a 48 year old female who presents to the emergency room for sharp chest pain that felt like \"knives\" in her chest. It was midsternal, she stated she could not take deep breaths when it was occurring, and she had tingling in her arms.  She is also had shortness of breath for the last 6 weeks, states that she has to stop and take a deep breath when she is going for a walk with her friend, something that is not normal for her.  Her pain today is 2 of 10 at this time, was 7/10 when she first noted the problem.  She is concerned about her heart as her brother just had severe heart problems at age 37. She does have a history of asthma, has an albuterol inhaler, has been using it for shortness of breath without effect.  She has stopped smoking, however she smoked half pack per day for 20 years prior to stopping.  She has no pressure like chest pain, has had no associated symptoms with the exception of the shortness of breath.    Problem List:    Patient Active Problem List    Diagnosis Date Noted     S/P vaginal hysterectomy 04/27/2017     Priority: Medium     Surgery, elective 04/26/2017     Priority: Medium     Uterine cramping 03/16/2017     Priority: Medium     Excessive uterine cramping with IUD       Encounter for IUD insertion 02/27/2017     Priority: Medium     Mirena on 2/27/17       Excessive or frequent menstruation 02/21/2017     Priority: Medium     Bleeding for 21 days.  EMB 2/27/17       ACP (advance care planning) 06/15/2016     Priority: Medium     Advance Care Planning 6/15/2016: ACP Review of Chart / Resources Provided:  Reviewed chart for advance care plan.  Raine Ayala has no plan or code status on file. Discussed available resources and " provided with information. Confirmed code status reflects current choices pending further ACP discussions.  Confirmed/documented legally designated decision makers.  Added by CHUCKY CARR                Past Medical History:    Past Medical History:   Diagnosis Date     Lumbago 2013       Past Surgical History:    Past Surgical History:   Procedure Laterality Date      SECTION       COLONOSCOPY      Recall colonoscopy 5 years     COLONOSCOPY      Recall colonoscopy 5 years     colonoscopy with polypectomy repeat 2 years       CYSTOSCOPY N/A 2017    Procedure: CYSTOSCOPY;;  Surgeon: Antoni Trinh MD;  Location: [facility]     HYSTERECTOMY VAGINAL N/A 2017    Procedure: HYSTERECTOMY VAGINAL;  VAGINAL HYSTERECTOMY, CYSTOSCOPY;  Surgeon: Antoni Trinh MD;  Location: [facility]            wisdom teeth extraction         Family History:    Family History   Problem Relation Age of Onset     Diabetes Mother      C.A.D. Father      Lipids Father      Hyperlipidemia     Hypertension Father        Social History:  Marital Status:  Single [1]  Social History   Substance Use Topics     Smoking status: Current Every Day Smoker     Packs/day: 0.50     Years: 20.00     Types: Cigarettes     Smokeless tobacco: Never Used      Comment: Tried to Quit (YES); Passive Exposure (NO); Longest Free (2 years)     Alcohol use Yes      Comment: Occasionally        Medications:      albuterol (PROAIR HFA/PROVENTIL HFA/VENTOLIN HFA) 108 (90 BASE) MCG/ACT Inhaler   ibuprofen (ADVIL/MOTRIN) 800 MG tablet   [DISCONTINUED] NO ACTIVE MEDICATIONS         Review of Systems   Constitutional: Positive for activity change. Negative for diaphoresis, fatigue and fever.   HENT: Negative.    Respiratory: Positive for shortness of breath. Negative for cough and wheezing.    Cardiovascular: Positive for chest pain.   Gastrointestinal: Negative for abdominal pain, constipation, diarrhea, nausea and vomiting.    Genitourinary: Negative for dysuria.   Musculoskeletal: Negative.    Skin: Negative.    Neurological: Negative.    Psychiatric/Behavioral: The patient is nervous/anxious.        Physical Exam   BP: 136/96  Pulse: 62  Heart Rate: 55  Temp: 97.8  F (36.6  C)  Resp: 16  SpO2: 97 %      Physical Exam   Constitutional: She is oriented to person, place, and time. She appears well-developed and well-nourished. No distress.   HENT:   Head: Normocephalic and atraumatic.   Neck: Normal range of motion. Neck supple.   Cardiovascular: Normal rate, regular rhythm, normal heart sounds and intact distal pulses.    No murmur heard.  Pulmonary/Chest: Effort normal and breath sounds normal. No respiratory distress. She has no wheezes. She has no rales.   Abdominal: Soft. Bowel sounds are normal. She exhibits no distension. There is no tenderness.   Musculoskeletal: Normal range of motion. She exhibits no edema.   Neurological: She is alert and oriented to person, place, and time.   Skin: Skin is warm and dry.   Psychiatric: She has a normal mood and affect.   Nursing note and vitals reviewed.      ED Course     ED Course     Procedures         EKG Interpretation:      Interpreted by Trinh Rick  Time reviewed: 1215  Symptoms at time of EKG: sharp chest pain, dyspnea   Rhythm: normal sinus   Rate: normal  Axis: normal  Ectopy: none  Conduction: normal  ST Segments/ T Waves: No ST-T wave changes  Q Waves: none  Comparison to prior: Unchanged    Clinical Impression: normal EKG    Results for orders placed or performed during the hospital encounter of 08/08/18 (from the past 24 hour(s))   CBC with platelets differential   Result Value Ref Range    WBC 6.7 4.0 - 11.0 10e9/L    RBC Count 4.15 3.8 - 5.2 10e12/L    Hemoglobin 13.8 11.7 - 15.7 g/dL    Hematocrit 39.5 35.0 - 47.0 %    MCV 95 78 - 100 fl    MCH 33.3 (H) 26.5 - 33.0 pg    MCHC 34.9 31.5 - 36.5 g/dL    RDW 12.5 10.0 - 15.0 %    Platelet Count 189 150 - 450 10e9/L     Diff Method Automated Method     % Neutrophils 65.2 %    % Lymphocytes 22.2 %    % Monocytes 10.6 %    % Eosinophils 1.0 %    % Basophils 0.7 %    % Immature Granulocytes 0.3 %    Nucleated RBCs 0 0 /100    Absolute Neutrophil 4.3 1.6 - 8.3 10e9/L    Absolute Lymphocytes 1.5 0.8 - 5.3 10e9/L    Absolute Monocytes 0.7 0.0 - 1.3 10e9/L    Absolute Eosinophils 0.1 0.0 - 0.7 10e9/L    Absolute Basophils 0.1 0.0 - 0.2 10e9/L    Abs Immature Granulocytes 0.0 0 - 0.4 10e9/L    Absolute Nucleated RBC 0.0    Comprehensive metabolic panel   Result Value Ref Range    Sodium 138 133 - 144 mmol/L    Potassium 4.5 3.4 - 5.3 mmol/L    Chloride 107 94 - 109 mmol/L    Carbon Dioxide 24 20 - 32 mmol/L    Anion Gap 7 3 - 14 mmol/L    Glucose 106 (H) 70 - 99 mg/dL    Urea Nitrogen 11 7 - 30 mg/dL    Creatinine 0.66 0.52 - 1.04 mg/dL    GFR Estimate >90 >60 mL/min/1.7m2    GFR Estimate If Black >90 >60 mL/min/1.7m2    Calcium 8.4 (L) 8.5 - 10.1 mg/dL    Bilirubin Total 0.4 0.2 - 1.3 mg/dL    Albumin 3.8 3.4 - 5.0 g/dL    Protein Total 7.4 6.8 - 8.8 g/dL    Alkaline Phosphatase 48 40 - 150 U/L    ALT 33 0 - 50 U/L    AST 24 0 - 45 U/L   Troponin I   Result Value Ref Range    Troponin I ES <0.015 0.000 - 0.045 ug/L   Nt probnp inpatient   Result Value Ref Range    N-Terminal Pro BNP Inpatient 38 0 - 450 pg/mL   D-Dimer ([facility])   Result Value Ref Range    D-Dimer ng/mL 260 0 - 300 ng/ml D-DU   XR Chest 2 Views    Narrative    XR CHEST 2 VW    HISTORY: 48 years Female Shortness of breath;     COMPARISON: 4/14/2017    TECHNIQUE: 2 views of the chest were obtained.    FINDINGS: Two views of the chest were obtained. Heart size and  pulmonary vascularity are within normal limits, lungs are clear both  views. No consolidating air space opacities are present.          Impression    IMPRESSION: Clear chest.    SHANTE KY, MD       Medications   ipratropium - albuterol 0.5 mg/2.5 mg/3 mL (DUONEB) neb solution 3 mL (3 mLs Nebulization Given  8/8/18 1325)   methylPREDNISolone sodium succinate (solu-MEDROL) injection 125 mg (125 mg Intramuscular Given 8/8/18 1409)       Assessments & Plan (with Medical Decision Making)   Patient's labs are entirely within normal limits including troponin, proBNP, and d-dimer.  She has no elevation in her white count.  Chemistries are essentially normal.  EKG shows no evidence of infarction, no abnormalities whatsoever.  Reassured patient that her heart is not causing problems, suspect this was chest wall pain, but is certainly noncardiac.  Plan to have the patient follow-up with primary care as needed, she is in need of pulmonary function testing and her primary will arrange this.  No new medications at this time, patient was given a DuoNeb which had no effect and Solu-Medrol which will be absorbing over the next 24 hours.    I have reviewed the nursing notes.    I have reviewed the findings, diagnosis, plan and need for follow up with the patient.  Discharge Medication List as of 8/8/2018  2:41 PM          Final diagnoses:   Non-cardiac chest pain       8/8/2018   [facility]     Trinh Dong MD  08/08/18 [id number]

## 2018-08-10 ENCOUNTER — OFFICE VISIT (OUTPATIENT)
Dept: FAMILY MEDICINE | Facility: OTHER | Age: 48
End: 2018-08-10
Attending: NURSE PRACTITIONER
Payer: COMMERCIAL

## 2018-08-10 VITALS
OXYGEN SATURATION: 96 % | BODY MASS INDEX: 28.41 KG/M2 | TEMPERATURE: 98.9 F | DIASTOLIC BLOOD PRESSURE: 78 MMHG | SYSTOLIC BLOOD PRESSURE: 115 MMHG | HEART RATE: 65 BPM | WEIGHT: 160.4 LBS

## 2018-08-10 DIAGNOSIS — R06.09 DYSPNEA ON EXERTION: Primary | ICD-10-CM

## 2018-08-10 DIAGNOSIS — Z82.49 FAMILY HISTORY OF EARLY CAD: ICD-10-CM

## 2018-08-10 DIAGNOSIS — K21.9 GASTROESOPHAGEAL REFLUX DISEASE, ESOPHAGITIS PRESENCE NOT SPECIFIED: ICD-10-CM

## 2018-08-10 PROCEDURE — 99214 OFFICE O/P EST MOD 30 MIN: CPT | Performed by: NURSE PRACTITIONER

## 2018-08-10 ASSESSMENT — ANXIETY QUESTIONNAIRES
7. FEELING AFRAID AS IF SOMETHING AWFUL MIGHT HAPPEN: NOT AT ALL
6. BECOMING EASILY ANNOYED OR IRRITABLE: NOT AT ALL
2. NOT BEING ABLE TO STOP OR CONTROL WORRYING: NOT AT ALL
4. TROUBLE RELAXING: NOT AT ALL
IF YOU CHECKED OFF ANY PROBLEMS ON THIS QUESTIONNAIRE, HOW DIFFICULT HAVE THESE PROBLEMS MADE IT FOR YOU TO DO YOUR WORK, TAKE CARE OF THINGS AT HOME, OR GET ALONG WITH OTHER PEOPLE: NOT DIFFICULT AT ALL
1. FEELING NERVOUS, ANXIOUS, OR ON EDGE: NOT AT ALL
GAD7 TOTAL SCORE: 2
5. BEING SO RESTLESS THAT IT IS HARD TO SIT STILL: SEVERAL DAYS
3. WORRYING TOO MUCH ABOUT DIFFERENT THINGS: SEVERAL DAYS

## 2018-08-10 ASSESSMENT — PAIN SCALES - GENERAL: PAINLEVEL: NO PAIN (0)

## 2018-08-10 NOTE — NURSING NOTE
"Chief Complaint   Patient presents with     Hospital F/U     [facility] 8/8/18     Nicotine Dependence       Initial /78 (BP Location: Left arm, Patient Position: Chair, Cuff Size: Adult Regular)  Pulse 65  Temp 98.9  F (37.2  C) (Tympanic)  Wt 160 lb 6.4 oz (72.8 kg)  LMP 04/12/2017 (Approximate)  SpO2 96%  BMI 28.41 kg/m2 Estimated body mass index is 28.41 kg/(m^2) as calculated from the following:    Height as of 10/25/17: 5' 3\" (1.6 m).    Weight as of this encounter: 160 lb 6.4 oz (72.8 kg).  Medication Reconciliation: annie Jaramillo    "

## 2018-08-10 NOTE — MR AVS SNAPSHOT
After Visit Summary   8/10/2018    Raine Ayala    MRN: [id number]           Patient Information     Date Of Birth          1970        Visit Information        Provider Department      8/10/2018 9:30 AM Kaila Henry NP [facility]        Today's Diagnoses     Dyspnea on exertion    -  1    Family history of early CAD        Gastroesophageal reflux disease, esophagitis presence not specified          Care Instructions        ASSESSMENT/PLAN:       1. Dyspnea on exertion  - General PFT Lab (Please always keep checked); Future  - Pulmonary Function Test; Future  - General PFT Lab (Please always keep checked)    2. Family history of early CAD  Reviewed risk factors, may consider cardiac stress test  Start 81mg aspirin daily    3. Gastroesophageal reflux disease, esophagitis presence not specified  symptomatic  - omeprazole (PRILOSEC) 20 MG CR capsule; Take 1 capsule (20 mg) by mouth daily  Dispense: 90 capsule; Refill: 3        FUTURE APPOINTMENTS:       - Follow-up visit as needed     Kaila Henry NP  [facility]            Follow-ups after your visit        Your next 10 appointments already scheduled     Aug 15, 2018  9:00 AM CDT   (Arrive by 8:45 AM)   Office Visit with Kaila Henry NP   [facility] ([facility] )    [address]  [facility] [address]   [phone]           Bring a current list of meds and any records pertaining to this visit.  For Physicals, please bring immunization records and any forms needing to be filled out.  Please arrive 15 minutes early to complete paperwork and register.              Future tests that were ordered for you today     Open Future Orders        Priority Expected Expires Ordered    Pulmonary Function Test Routine  8/10/2019 8/10/2018            Who to contact     If you have questions or need follow up information about today's clinic visit  or your schedule please contact [facility] directly at [phone].  Normal or non-critical lab and imaging results will be communicated to you by MyChart, letter or phone within 4 business days after the clinic has received the results. If you do not hear from us within 7 days, please contact the clinic through My Online Camphart or phone. If you have a critical or abnormal lab result, we will notify you by phone as soon as possible.  Submit refill requests through 4INFO or call your pharmacy and they will forward the refill request to us. Please allow 3 business days for your refill to be completed.          Additional Information About Your Visit        My Online CampharSpotivate Information     4INFO gives you secure access to your electronic health record. If you see a primary care provider, you can also send messages to your care team and make appointments. If you have questions, please call your primary care clinic.  If you do not have a primary care provider, please call [phone] and they will assist you.        Care EveryWhere ID     This is your Care EveryWhere ID. This could be used by other organizations to access your [place] medical records  [id number]        Your Vitals Were     Pulse Temperature Last Period Pulse Oximetry BMI (Body Mass Index)       65 98.9  F (37.2  C) (Tympanic) 04/12/2017 (Approximate) 96% 28.41 kg/m2        Blood Pressure from Last 3 Encounters:   08/10/18 115/78   08/08/18 129/81   10/25/17 124/72    Weight from Last 3 Encounters:   08/10/18 160 lb 6.4 oz (72.8 kg)   10/25/17 156 lb (70.8 kg)   08/22/17 150 lb 12.8 oz (68.4 kg)                 Today's Medication Changes          These changes are accurate as of 8/10/18 10:06 AM.  If you have any questions, ask your nurse or doctor.               Start taking these medicines.        Dose/Directions    omeprazole 20 MG CR capsule   Commonly known as:  priLOSEC   Used for:  Gastroesophageal reflux disease, esophagitis presence not  specified   Started by:  Kaila Henry NP        Dose:  20 mg   Take 1 capsule (20 mg) by mouth daily   Quantity:  90 capsule   Refills:  3            Where to get your medicines      These medications were sent to [facility] - [place], [place] - [address]  [address] [address]     Phone:  [phone]     omeprazole 20 MG CR capsule                Primary Care Provider Office Phone # Fax #    Kaila Henry [phone] [facility] [address]        Equal Access to Services     [facility]: Hadii aad ku hadasho Soomaali, waaxda luqadaha, qaybta kaalmada adeegyada, miguel dahl . So [facility] [phone].    ATENCIÓN: Si habla español, tiene a akers disposición servicios gratuitos de asistencia lingüística. [facility] [phone].    We comply with applicable federal civil rights laws and [place] laws. We do not discriminate on the basis of race, color, national origin, age, disability, sex, sexual orientation, or gender identity.            Thank you!     Thank you for choosing [facility]  for your care. Our goal is always to provide you with excellent care. Hearing back from our patients is one way we can continue to improve our services. Please take a few minutes to complete the written survey that you may receive in the mail after your visit with us. Thank you!             Your Updated Medication List - Protect others around you: Learn how to safely use, store and throw away your medicines at www.disposemymeds.org.          This list is accurate as of 8/10/18 10:06 AM.  Always use your most recent med list.                   Brand Name Dispense Instructions for use Diagnosis    albuterol 108 (90 Base) MCG/ACT Inhaler    PROAIR HFA/PROVENTIL HFA/VENTOLIN HFA    1 Inhaler    Inhale 2 puffs into the lungs every 4 hours as needed for shortness of breath / dyspnea or  wheezing    Viral URI with cough       ibuprofen 800 MG tablet    ADVIL/MOTRIN    90 tablet    Take 1 tablet (800 mg) by mouth 3 times daily as needed for moderate pain    Acute midline low back pain with left-sided sciatica       omeprazole 20 MG CR capsule    priLOSEC    90 capsule    Take 1 capsule (20 mg) by mouth daily    Gastroesophageal reflux disease, esophagitis presence not specified

## 2018-08-11 ASSESSMENT — PATIENT HEALTH QUESTIONNAIRE - PHQ9: SUM OF ALL RESPONSES TO PHQ QUESTIONS 1-9: 2

## 2018-08-11 ASSESSMENT — ANXIETY QUESTIONNAIRES: GAD7 TOTAL SCORE: 2

## 2018-09-05 ENCOUNTER — HOSPITAL ENCOUNTER (OUTPATIENT)
Dept: RESPIRATORY THERAPY | Facility: HOSPITAL | Age: 48
Discharge: HOME | End: 2018-09-05
Attending: NURSE PRACTITIONER | Admitting: NURSE PRACTITIONER
Payer: COMMERCIAL

## 2018-09-05 DIAGNOSIS — R06.09 DYSPNEA ON EXERTION: ICD-10-CM

## 2018-09-05 PROCEDURE — 94010 BREATHING CAPACITY TEST: CPT | Mod: 26 | Performed by: INTERNAL MEDICINE

## 2018-09-05 PROCEDURE — 94010 BREATHING CAPACITY TEST: CPT

## 2018-09-05 PROCEDURE — 94726 PLETHYSMOGRAPHY LUNG VOLUMES: CPT | Mod: 26 | Performed by: INTERNAL MEDICINE

## 2018-09-05 PROCEDURE — 94726 PLETHYSMOGRAPHY LUNG VOLUMES: CPT

## 2018-09-05 PROCEDURE — 94729 DIFFUSING CAPACITY: CPT

## 2018-09-05 PROCEDURE — 94729 DIFFUSING CAPACITY: CPT | Mod: 26 | Performed by: INTERNAL MEDICINE

## 2018-09-05 RX ORDER — ALBUTEROL SULFATE 0.83 MG/ML
2.5 SOLUTION RESPIRATORY (INHALATION)
Status: DISCONTINUED | OUTPATIENT
Start: 2018-09-05 | End: 2018-09-06 | Stop reason: HOSPADM

## 2018-09-11 ENCOUNTER — MYC MEDICAL ADVICE (OUTPATIENT)
Dept: FAMILY MEDICINE | Facility: OTHER | Age: 48
End: 2018-09-11

## 2018-09-11 DIAGNOSIS — R06.09 DYSPNEA ON EXERTION: Primary | ICD-10-CM

## 2018-09-24 ENCOUNTER — HOSPITAL ENCOUNTER (OUTPATIENT)
Dept: CARDIOLOGY | Facility: HOSPITAL | Age: 48
Setting detail: NUCLEAR MEDICINE
End: 2018-09-24
Attending: NURSE PRACTITIONER
Payer: COMMERCIAL

## 2018-09-24 ENCOUNTER — HOSPITAL ENCOUNTER (OUTPATIENT)
Dept: NUCLEAR MEDICINE | Facility: HOSPITAL | Age: 48
Setting detail: NUCLEAR MEDICINE
End: 2018-09-24
Attending: NURSE PRACTITIONER
Payer: COMMERCIAL

## 2018-09-24 ENCOUNTER — HOSPITAL ENCOUNTER (OUTPATIENT)
Dept: NUCLEAR MEDICINE | Facility: HOSPITAL | Age: 48
Setting detail: NUCLEAR MEDICINE
Discharge: HOME | End: 2018-09-24
Attending: NURSE PRACTITIONER | Admitting: NURSE PRACTITIONER
Payer: COMMERCIAL

## 2018-09-24 DIAGNOSIS — R06.09 DYSPNEA ON EXERTION: ICD-10-CM

## 2018-09-24 PROCEDURE — 78452 HT MUSCLE IMAGE SPECT MULT: CPT | Mod: TC

## 2018-09-24 PROCEDURE — 25000128 H RX IP 250 OP 636: Performed by: INTERNAL MEDICINE

## 2018-09-24 PROCEDURE — 93018 CV STRESS TEST I&R ONLY: CPT | Performed by: INTERNAL MEDICINE

## 2018-09-24 PROCEDURE — 93017 CV STRESS TEST TRACING ONLY: CPT

## 2018-09-24 PROCEDURE — 93016 CV STRESS TEST SUPVJ ONLY: CPT | Performed by: INTERNAL MEDICINE

## 2018-09-24 PROCEDURE — A9500 TC99M SESTAMIBI: HCPCS | Performed by: RADIOLOGY

## 2018-09-24 PROCEDURE — 34300033 ZZH RX 343: Performed by: RADIOLOGY

## 2018-09-24 RX ORDER — SODIUM CHLORIDE 9 MG/ML
INJECTION, SOLUTION INTRAVENOUS ONCE
Status: COMPLETED | OUTPATIENT
Start: 2018-09-24 | End: 2018-09-24

## 2018-09-24 RX ADMIN — Medication 30 MILLICURIE: at 08:59

## 2018-09-24 RX ADMIN — Medication 10 MILLICURIE: at 07:00

## 2018-09-24 RX ADMIN — SODIUM CHLORIDE: 9 INJECTION, SOLUTION INTRAVENOUS at 08:38

## 2020-08-24 ENCOUNTER — OFFICE VISIT (OUTPATIENT)
Dept: FAMILY MEDICINE | Facility: OTHER | Age: 50
End: 2020-08-24
Attending: NURSE PRACTITIONER
Payer: COMMERCIAL

## 2020-08-24 ENCOUNTER — NURSE TRIAGE (OUTPATIENT)
Dept: FAMILY MEDICINE | Facility: OTHER | Age: 50
End: 2020-08-24

## 2020-08-24 DIAGNOSIS — Z20.822 EXPOSURE TO COVID-19 VIRUS: Primary | ICD-10-CM

## 2020-08-24 PROCEDURE — U0003 INFECTIOUS AGENT DETECTION BY NUCLEIC ACID (DNA OR RNA); SEVERE ACUTE RESPIRATORY SYNDROME CORONAVIRUS 2 (SARS-COV-2) (CORONAVIRUS DISEASE [COVID-19]), AMPLIFIED PROBE TECHNIQUE, MAKING USE OF HIGH THROUGHPUT TECHNOLOGIES AS DESCRIBED BY CMS-2020-01-R: HCPCS | Performed by: NURSE PRACTITIONER

## 2020-08-24 NOTE — TELEPHONE ENCOUNTER
COVID 19 Nurse Triage Plan/Patient Instructions    Please be aware that novel coronavirus (COVID-19) may be circulating in the community. If you develop symptoms such as fever, cough, or SOB or if you have concerns about the presence of another infection including coronavirus (COVID-19), please contact your health care provider or visit [email or web address].     Disposition/Instructions    Home care recommended. Follow home care protocol based instructions.    Thank you for taking steps to prevent the spread of this virus.  o Limit your contact with others.  o Wear a simple mask to cover your cough.  o Wash your hands well and often.    Resources    [facility]: About COVID-19: [email or web address]    CDC: What to Do If You're Sick: www.cdc.gov/coronavirus/2019-ncov/about/steps-when-sick.html    CDC: Ending Home Isolation: www.cdc.gov/coronavirus/2019-ncov/hcp/disposition-in-home-patients.html     CDC: Caring for Someone: www.cdc.gov/coronavirus/2019-ncov/if-you-are-sick/care-for-someone.html     [facility]: Interim Guidance for Hospital Discharge to Home: www.[facility].[place]/diseases/coronavirus/hcp/hospdischarge.pdf    [facility] clinical trials (COVID-19 research studies): clinicalaffairs.[facility]-clinical-trials     Below are the COVID-19 hotlines at the [place] Department of Health ([facility]). Interpreters are available.   o For health questions: Call 252-439-0460 or 1-300.542.2286 (7 a.m. to 7 p.m.)  o For questions about schools and childcare: Call 060-330-0436 or 1-293.790.7311 (7 a.m. to 7 p.m.)                         Reason for Disposition    [1] COVID-19 EXPOSURE (Close Contact) within last 14 days AND [2] needs COVID-19 lab test to return to work AND [3] NO symptoms    Additional Information    Negative: COVID-19 has been diagnosed by a healthcare provider (HCP)    Negative: COVID-19 lab test positive    Negative: [1] Symptoms of COVID-19 (e.g., cough, fever, SOB, or others) AND [2]  "lives in an area with community spread    Negative: [1] Symptoms of COVID-19 (e.g., cough, fever, SOB, or others) AND [2] within 14 days of EXPOSURE (close contact) with diagnosed or suspected COVID-19 patient    Negative: [1] Symptoms of COVID-19 (e.g., cough, fever, SOB, or others) AND [2] within 14 days of travel from high-risk area for COVID-19 community spread (identified by CDC)    Negative: [1] Difficulty breathing (shortness of breath) occurs AND [2] onset > 14 days after COVID-19 EXPOSURE (Close Contact) AND [3] no community spread where patient lives    Negative: [1] Dry cough occurs AND [2] onset > 14 days after COVID-19 EXPOSURE AND [3] no community spread where patient lives    Negative: [1] Wet cough (i.e., white-yellow, yellow, green, or anat colored sputum) AND [2] onset > 14 days after COVID-19 EXPOSURE AND [3] no community spread where patient lives    Negative: [1] Common cold symptoms AND [2] onset > 14 days after COVID-19 EXPOSURE AND [3] no community spread where patient lives    Answer Assessment - Initial Assessment Questions  1. CLOSE CONTACT: \"Who is the person with the confirmed or suspected COVID-19 infection that you were exposed to?\"      coworker  2. PLACE of CONTACT: \"Where were you when you were exposed to COVID-19?\" (e.g., home, school, medical waiting room; which city?)      work  3. TYPE of CONTACT: \"How much contact was there?\" (e.g., sitting next to, live in same house, work in same office, same building)      Close, sitting next eachohter  4. DURATION of CONTACT: \"How long were you in contact with the COVID-19 patient?\" (e.g., a few seconds, passed by person, a few minutes, live with the patient)      hours  5. DATE of CONTACT: \"When did you have contact with a COVID-19 patient?\" (e.g., how many days ago)      8/19/20  6. TRAVEL: \"Have you traveled out of the country recently?\" If so, \"When and where?\"      * Also ask about out-of-state travel, since the CDC has identified " "some high-risk cities for community spread in the .      * Note: Travel becomes less relevant if there is widespread community transmission where the patient lives.      no  7. COMMUNITY SPREAD: \"Are there lots of cases of COVID-19 (community spread) where you live?\" (See public health department website, if unsure)        minor  8. SYMPTOMS: \"Do you have any symptoms?\" (e.g., fever, cough, breathing difficulty)      denies  9. PREGNANCY OR POSTPARTUM: \"Is there any chance you are pregnant?\" \"When was your last menstrual period?\" \"Did you deliver in the last 2 weeks?\"      no  10. HIGH RISK: \"Do you have any heart or lung problems? Do you have a weak immune system?\" (e.g., CHF, COPD, asthma, HIV positive, chemotherapy, renal failure, diabetes mellitus, sickle cell anemia)        denies    Protocols used: CORONAVIRUS (COVID-19) EXPOSURE-A- 5.16.20      "

## 2020-08-26 LAB
SARS-COV-2 RNA SPEC QL NAA+PROBE: NOT DETECTED
SPECIMEN SOURCE: NORMAL

## 2020-09-22 DIAGNOSIS — Z12.31 VISIT FOR SCREENING MAMMOGRAM: Primary | ICD-10-CM

## 2020-09-24 NOTE — PROGRESS NOTES
"   SUBJECTIVE:   CC: Raine Ayala is an 50 year old woman who presents for preventive health visit.     Patient has been advised of split billing requirements and indicates understanding: Yes  Healthy Habits:    Do you get at least three servings of calcium containing foods daily (dairy, green leafy vegetables, etc.)? yes    Amount of exercise or daily activities, outside of work: 1 hour(s) per day    Problems taking medications regularly No    Medication side effects: No    Have you had an eye exam in the past two years? yes    Do you see a dentist twice per year? yes    Do you have sleep apnea, excessive snoring or daytime drowsiness?no      She hit the back of her head on a bunk bed December 2018, pain was immediate and intense.  She could not turn her head, pain was \"paralizing\".  She oralia to the chiropractor, feels it made it worse.  She has also used ice, heat, essential oils.  She did not do physical therapy, she did not go in.  Pain is intermittent, headaches are rare.  When pain occurs, she describes it as tense, tight, dull aching.  She does not use tylenol or ibuprofen unless it is really bad.        Hot flashes most nights, cannot sleep and wakes up \"drenched\" in sweat.  Wonders if there are any non-estrogen options.      Takes omeprazole daily for heartburn, works well and needs a refill today.      Will obtain flu vaccine at work.     Today's PHQ-2 Score: No flowsheet data found.    Abuse: Current or Past(Physical, Sexual or Emotional)- No  Do you feel safe in your environment? Yes        Social History     Tobacco Use     Smoking status: Current Every Day Smoker     Packs/day: 0.50     Years: 20.00     Pack years: 10.00     Types: Cigarettes     Smokeless tobacco: Never Used     Tobacco comment: Tried to Quit (YES); Passive Exposure (NO); Longest Free (2 years)   Substance Use Topics     Alcohol use: Yes     Comment: Occasionally     If you drink alcohol do you typically have >3 drinks per day or " >7 drinks per week? Yes - AUDIT SCORE:                          Reviewed orders with patient.  Reviewed health maintenance and updated orders accordingly - Yes  BP Readings from Last 3 Encounters:   10/01/20 128/72   08/10/18 115/78   18 129/81    Wt Readings from Last 3 Encounters:   10/01/20 73 kg (161 lb)   08/10/18 72.8 kg (160 lb 6.4 oz)   10/25/17 70.8 kg (156 lb)                  Patient Active Problem List   Diagnosis     ACP (advance care planning)     Excessive or frequent menstruation     Encounter for IUD insertion     Uterine cramping     Surgery, elective     S/P vaginal hysterectomy     Past Surgical History:   Procedure Laterality Date      SECTION       COLONOSCOPY      Recall colonoscopy 5 years     COLONOSCOPY      Recall colonoscopy 5 years     colonoscopy with polypectomy repeat 2 years       CYSTOSCOPY N/A 2017    Procedure: CYSTOSCOPY;;  Surgeon: Antoni Trinh MD;  Location: [facility]     HYSTERECTOMY VAGINAL N/A 2017    Procedure: HYSTERECTOMY VAGINAL;  VAGINAL HYSTERECTOMY, CYSTOSCOPY;  Surgeon: Antoni Trinh MD;  Location: [facility] OR            wisdom teeth extraction         Social History     Tobacco Use     Smoking status: Former Smoker     Packs/day: 0.50     Years: 20.00     Pack years: 10.00     Types: Cigarettes     Start date: 1990     Quit date: 3/1/2017     Years since quitting: 3.5     Smokeless tobacco: Never Used     Tobacco comment: Tried to Quit (YES); Passive Exposure (NO); Longest Free (2 years)   Substance Use Topics     Alcohol use: Yes     Comment: Occasionally     Family History   Problem Relation Age of Onset     Diabetes Mother      C.A.D. Father      Lipids Father         Hyperlipidemia     Hypertension Father          Current Outpatient Medications   Medication Sig Dispense Refill     venlafaxine (EFFEXOR) 37.5 MG tablet Take 1 tablet (37.5 mg) by mouth At Bedtime 90 tablet 1     ibuprofen (ADVIL/MOTRIN) 800 MG tablet  Take 1 tablet (800 mg) by mouth 3 times daily as needed for moderate pain (Patient not taking: Reported on 10/1/2020) 90 tablet 1     omeprazole (PRILOSEC) 20 MG DR capsule TAKE 1 CAPSULE (20MG) BY MOUTH DAILY 90 capsule 0       Mammogram is scheduled.      Pertinent mammograms are reviewed under the imaging tab.  History of abnormal Pap smear: hx hysterectomy   PAP / HPV Latest Ref Rng & Units 2/27/2017 6/15/2016   PAP - NIL NIL   HPV 16 DNA NEG Negative -   HPV 18 DNA NEG Negative -   OTHER HR HPV NEG Negative -     Reviewed and updated as needed this visit by clinical staff  Tobacco  Allergies  Meds              Reviewed and updated as needed this visit by Provider                    ROS:  CONSTITUTIONAL: NEGATIVE for fever, chills, change in weight  INTEGUMENTARY/SKIN: NEGATIVE for worrisome rashes, moles or lesions  EYES: NEGATIVE for vision changes or irritation  ENT: NEGATIVE for ear, mouth and throat problems  RESP: NEGATIVE for significant cough or SOB  BREAST: NEGATIVE for masses, tenderness or discharge  CV: NEGATIVE for chest pain, palpitations or peripheral edema  GI: NEGATIVE for nausea, abdominal pain, heartburn, or change in bowel habits  : NEGATIVE for unusual urinary or vaginal symptoms. No vaginal bleeding.  MUSCULOSKELETAL: NEGATIVE for significant arthralgias or myalgia  NEURO: NEGATIVE for weakness, dizziness or paresthesias  ENDOCRINE: hot flashes  PSYCHIATRIC: NEGATIVE for changes in mood or affect     OBJECTIVE:   /72 (BP Location: Right arm, Patient Position: Chair, Cuff Size: Adult Regular)   Pulse 65   Temp 97.3  F (36.3  C) (Tympanic)   Wt 73 kg (161 lb)   LMP 04/12/2017 (Approximate)   SpO2 97%   BMI 28.52 kg/m    EXAM:  GENERAL: healthy, alert and no distress  EYES: Eyes grossly normal to inspection, PERRL and conjunctivae and sclerae normal  HENT: ear canals and TM's normal, nose and mouth without ulcers or lesions  NECK: no adenopathy, no asymmetry, masses, or scars  "and thyroid normal to palpation  RESP: lungs clear to auscultation - no rales, rhonchi or wheezes  CV: regular rate and rhythm, normal S1 S2, no S3 or S4, no murmur, click or rub, no peripheral edema and peripheral pulses strong  ABDOMEN: soft, nontender, no hepatosplenomegaly, no masses and bowel sounds normal  MS: no gross musculoskeletal defects noted, no edema  SKIN: no suspicious lesions or rashes  NEURO: Normal strength and tone, mentation intact and speech normal  PSYCH: mentation appears normal, affect normal/bright  Declined breast exam      ASSESSMENT/PLAN:   1. Routine general medical examination at a health care facility  Exam completed  - TSH with free T4 reflex  - Basic metabolic panel  - Lipid Profile (Chol, Trig, HDL, LDL calc)    2. Cervicalgia  - MR Cervical Spine w/o Contrast    3. Menopausal syndrome (hot flashes)  - venlafaxine (EFFEXOR) 37.5 MG tablet; Take 1 tablet (37.5 mg) by mouth At Bedtime  Dispense: 90 tablet; Refill: 1    4. Need for vaccination  - Pneumococcal vaccine 23 valent PPSV23  (Pneumovax) [11040]  - 1st  Administration  [51474]  Will obtain flu vaccine at work.     COUNSELING:   Reviewed preventive health counseling, as reflected in patient instructions       Regular exercise       Healthy diet/nutrition       Vision screening       Hearing screening       Immunizations    Vaccinated for: Pneumococcal             Colon cancer screening    Estimated body mass index is 28.52 kg/m  as calculated from the following:    Height as of 10/25/17: 1.6 m (5' 3\").    Weight as of this encounter: 73 kg (161 lb).    Weight management plan: Discussed healthy diet and exercise guidelines    She reports that she has been smoking cigarettes. She has a 10.00 pack-year smoking history. She has never used smokeless tobacco.  Tobacco Cessation Action Plan:   She has quit smoking 3 years ago, HM is updated.        Counseling Resources:  ATP IV Guidelines  Pooled Cohorts Equation Calculator  Breast " Cancer Risk Calculator  BRCA-Related Cancer Risk Assessment: FHS-7 Tool  FRAX Risk Assessment  ICSI Preventive Guidelines  Dietary Guidelines for Americans, 2010  USDA's MyPlate  ASA Prophylaxis  Lung CA Screening    Kaila Henry, NP  [facility]

## 2020-10-01 ENCOUNTER — OFFICE VISIT (OUTPATIENT)
Dept: FAMILY MEDICINE | Facility: OTHER | Age: 50
End: 2020-10-01
Attending: NURSE PRACTITIONER
Payer: COMMERCIAL

## 2020-10-01 ENCOUNTER — MYC REFILL (OUTPATIENT)
Dept: FAMILY MEDICINE | Facility: OTHER | Age: 50
End: 2020-10-01

## 2020-10-01 VITALS
WEIGHT: 161 LBS | OXYGEN SATURATION: 97 % | HEART RATE: 65 BPM | TEMPERATURE: 97.3 F | SYSTOLIC BLOOD PRESSURE: 128 MMHG | DIASTOLIC BLOOD PRESSURE: 72 MMHG | BODY MASS INDEX: 28.52 KG/M2

## 2020-10-01 DIAGNOSIS — K21.9 GASTROESOPHAGEAL REFLUX DISEASE WITHOUT ESOPHAGITIS: ICD-10-CM

## 2020-10-01 DIAGNOSIS — Z00.00 ROUTINE GENERAL MEDICAL EXAMINATION AT A HEALTH CARE FACILITY: Primary | ICD-10-CM

## 2020-10-01 DIAGNOSIS — Z23 NEED FOR VACCINATION: ICD-10-CM

## 2020-10-01 DIAGNOSIS — M54.2 CERVICALGIA: ICD-10-CM

## 2020-10-01 DIAGNOSIS — N95.1 MENOPAUSAL SYNDROME (HOT FLASHES): ICD-10-CM

## 2020-10-01 LAB
ANION GAP SERPL CALCULATED.3IONS-SCNC: 7 MMOL/L (ref 3–14)
BUN SERPL-MCNC: 14 MG/DL (ref 7–30)
CALCIUM SERPL-MCNC: 9.2 MG/DL (ref 8.5–10.1)
CHLORIDE SERPL-SCNC: 107 MMOL/L (ref 94–109)
CHOLEST SERPL-MCNC: 238 MG/DL
CO2 SERPL-SCNC: 27 MMOL/L (ref 20–32)
CREAT SERPL-MCNC: 0.66 MG/DL (ref 0.52–1.04)
GFR SERPL CREATININE-BSD FRML MDRD: >90 ML/MIN/{1.73_M2}
GLUCOSE SERPL-MCNC: 111 MG/DL (ref 70–99)
HDLC SERPL-MCNC: 48 MG/DL
LDLC SERPL CALC-MCNC: 152 MG/DL
NONHDLC SERPL-MCNC: 190 MG/DL
POTASSIUM SERPL-SCNC: 4 MMOL/L (ref 3.4–5.3)
SODIUM SERPL-SCNC: 141 MMOL/L (ref 133–144)
TRIGL SERPL-MCNC: 190 MG/DL
TSH SERPL DL<=0.005 MIU/L-ACNC: 0.76 MU/L (ref 0.4–4)

## 2020-10-01 PROCEDURE — 84443 ASSAY THYROID STIM HORMONE: CPT | Performed by: NURSE PRACTITIONER

## 2020-10-01 PROCEDURE — 80048 BASIC METABOLIC PNL TOTAL CA: CPT | Performed by: NURSE PRACTITIONER

## 2020-10-01 PROCEDURE — 80061 LIPID PANEL: CPT | Performed by: NURSE PRACTITIONER

## 2020-10-01 PROCEDURE — 36415 COLL VENOUS BLD VENIPUNCTURE: CPT | Performed by: NURSE PRACTITIONER

## 2020-10-01 PROCEDURE — 99396 PREV VISIT EST AGE 40-64: CPT | Performed by: NURSE PRACTITIONER

## 2020-10-01 PROCEDURE — 90732 PPSV23 VACC 2 YRS+ SUBQ/IM: CPT | Performed by: NURSE PRACTITIONER

## 2020-10-01 PROCEDURE — G0463 HOSPITAL OUTPT CLINIC VISIT: HCPCS | Mod: 25

## 2020-10-01 RX ORDER — VENLAFAXINE 37.5 MG/1
37.5 TABLET ORAL AT BEDTIME
Qty: 90 TABLET | Refills: 1 | Status: SHIPPED | OUTPATIENT
Start: 2020-10-01 | End: 2021-03-26

## 2020-10-01 ASSESSMENT — ANXIETY QUESTIONNAIRES
5. BEING SO RESTLESS THAT IT IS HARD TO SIT STILL: SEVERAL DAYS
2. NOT BEING ABLE TO STOP OR CONTROL WORRYING: SEVERAL DAYS
GAD7 TOTAL SCORE: 6
IF YOU CHECKED OFF ANY PROBLEMS ON THIS QUESTIONNAIRE, HOW DIFFICULT HAVE THESE PROBLEMS MADE IT FOR YOU TO DO YOUR WORK, TAKE CARE OF THINGS AT HOME, OR GET ALONG WITH OTHER PEOPLE: NOT DIFFICULT AT ALL
1. FEELING NERVOUS, ANXIOUS, OR ON EDGE: SEVERAL DAYS
3. WORRYING TOO MUCH ABOUT DIFFERENT THINGS: SEVERAL DAYS
4. TROUBLE RELAXING: SEVERAL DAYS
6. BECOMING EASILY ANNOYED OR IRRITABLE: SEVERAL DAYS
7. FEELING AFRAID AS IF SOMETHING AWFUL MIGHT HAPPEN: NOT AT ALL

## 2020-10-01 ASSESSMENT — PATIENT HEALTH QUESTIONNAIRE - PHQ9: SUM OF ALL RESPONSES TO PHQ QUESTIONS 1-9: 9

## 2020-10-01 ASSESSMENT — PAIN SCALES - GENERAL: PAINLEVEL: NO PAIN (0)

## 2020-10-01 NOTE — TELEPHONE ENCOUNTER
Omeprazole  Last Written Prescription Date: 7/26/19  Last Fill Quantity: 90 # of Refills: 0  Last Office Visit: 10/1/20

## 2020-10-01 NOTE — NURSING NOTE
"Chief Complaint   Patient presents with     Physical       Initial /72 (BP Location: Right arm, Patient Position: Chair, Cuff Size: Adult Regular)   Pulse 65   Temp 97.3  F (36.3  C) (Tympanic)   Wt 73 kg (161 lb)   LMP 04/12/2017 (Approximate)   SpO2 97%   BMI 28.52 kg/m   Estimated body mass index is 28.52 kg/m  as calculated from the following:    Height as of 10/25/17: 1.6 m (5' 3\").    Weight as of this encounter: 73 kg (161 lb).  Medication Reconciliation: complete  Nasima Leach LPN    "

## 2020-10-02 ASSESSMENT — ANXIETY QUESTIONNAIRES: GAD7 TOTAL SCORE: 6

## 2020-10-23 ENCOUNTER — ANCILLARY PROCEDURE (OUTPATIENT)
Dept: MAMMOGRAPHY | Facility: OTHER | Age: 50
End: 2020-10-23
Attending: NURSE PRACTITIONER
Payer: COMMERCIAL

## 2020-10-23 ENCOUNTER — HOSPITAL ENCOUNTER (OUTPATIENT)
Dept: MRI IMAGING | Facility: HOSPITAL | Age: 50
End: 2020-10-23
Attending: NURSE PRACTITIONER
Payer: COMMERCIAL

## 2020-10-23 DIAGNOSIS — Z12.31 VISIT FOR SCREENING MAMMOGRAM: ICD-10-CM

## 2020-10-23 PROCEDURE — 77067 SCR MAMMO BI INCL CAD: CPT | Mod: TC | Performed by: RADIOLOGY

## 2020-10-23 PROCEDURE — 72141 MRI NECK SPINE W/O DYE: CPT

## 2020-10-23 PROCEDURE — 77063 BREAST TOMOSYNTHESIS BI: CPT | Mod: TC | Performed by: RADIOLOGY

## 2020-11-16 ENCOUNTER — HEALTH MAINTENANCE LETTER (OUTPATIENT)
Age: 50
End: 2020-11-16

## 2021-03-25 DIAGNOSIS — N95.1 MENOPAUSAL SYNDROME (HOT FLASHES): ICD-10-CM

## 2021-03-26 RX ORDER — VENLAFAXINE 37.5 MG/1
37.5 TABLET ORAL AT BEDTIME
Qty: 90 TABLET | Refills: 1 | Status: SHIPPED | OUTPATIENT
Start: 2021-03-26 | End: 2021-10-22

## 2021-04-07 ENCOUNTER — IMMUNIZATION (OUTPATIENT)
Dept: FAMILY MEDICINE | Facility: OTHER | Age: 51
End: 2021-04-07
Attending: NURSE PRACTITIONER
Payer: COMMERCIAL

## 2021-04-07 PROCEDURE — 91300 PR COVID VAC PFIZER DIL RECON 30 MCG/0.3 ML IM: CPT

## 2021-04-07 PROCEDURE — 0001A PR COVID VAC PFIZER DIL RECON 30 MCG/0.3 ML IM: CPT

## 2021-04-26 ENCOUNTER — IMMUNIZATION (OUTPATIENT)
Dept: FAMILY MEDICINE | Facility: OTHER | Age: 51
End: 2021-04-26
Attending: FAMILY MEDICINE
Payer: COMMERCIAL

## 2021-04-26 PROCEDURE — 91300 PR COVID VAC PFIZER DIL RECON 30 MCG/0.3 ML IM: CPT

## 2021-04-26 PROCEDURE — 0002A PR COVID VAC PFIZER DIL RECON 30 MCG/0.3 ML IM: CPT

## 2021-09-18 ENCOUNTER — HEALTH MAINTENANCE LETTER (OUTPATIENT)
Age: 51
End: 2021-09-18

## 2021-10-21 DIAGNOSIS — N95.1 MENOPAUSAL SYNDROME (HOT FLASHES): ICD-10-CM

## 2021-10-22 RX ORDER — VENLAFAXINE 37.5 MG/1
37.5 TABLET ORAL AT BEDTIME
Qty: 90 TABLET | Refills: 0 | Status: SHIPPED | OUTPATIENT
Start: 2021-10-22 | End: 2021-11-22

## 2021-10-22 NOTE — TELEPHONE ENCOUNTER
Effexor       Last Written Prescription Date:  3/26/2021  Last Fill Quantity: 90,   # refills: 1  Last Office Visit: 10/1/2021  Future Office visit:

## 2021-11-13 ENCOUNTER — HEALTH MAINTENANCE LETTER (OUTPATIENT)
Age: 51
End: 2021-11-13

## 2021-11-19 NOTE — PROGRESS NOTES
"  Assessment & Plan     1. Menopausal syndrome (hot flashes)  Dose increase   - venlafaxine (EFFEXOR) 75 MG tablet; Take 1 tablet (75 mg) by mouth At Bedtime  Dispense: 90 tablet; Refill: 1  - TSH with free T4 reflex; Future    2. Gastroesophageal reflux disease without esophagitis  Dose increase, EGD  - omeprazole (PRILOSEC) 40 MG DR capsule; Take 1 capsule (40 mg) by mouth daily  Dispense: 90 capsule; Refill: 1  - sucralfate (CARAFATE) 1 GM/10ML suspension; Take 10 mLs (1 g) by mouth 4 times daily as needed (heartburn)  Dispense: 420 mL; Refill: 1  - Adult General Surg Referral; Future    3. Family history of type 2 diabetes mellitus  - Basic metabolic panel; Future    4. Family history of ischemic heart disease  - Lipid Profile; Future         BMI:   Estimated body mass index is 27.98 kg/m  as calculated from the following:    Height as of this encounter: 1.626 m (5' 4\").    Weight as of this encounter: 73.9 kg (163 lb).   Weight management plan: Discussed healthy diet and exercise guidelines    Will return for fasting labs tomorrow morning.    Will notify of results as they are returned.     Kaila Henry NP  [facility] - SOLED[place] quintanilla is a 51 year old who presents for the following health issues     HPI     Anxiety Follow-Up    How are you doing with your anxiety since your last visit? No change    Are you having other symptoms that might be associated with anxiety? No    Have you had a significant life event? No     Are you feeling depressed? No    Do you have any concerns with your use of alcohol or other drugs? No    Social History     Tobacco Use     Smoking status: Former Smoker     Packs/day: 0.50     Years: 20.00     Pack years: 10.00     Types: Cigarettes     Start date: 1990     Quit date: 3/1/2017     Years since quittin.7     Smokeless tobacco: Never Used     Tobacco comment: Tried to Quit (YES); Passive Exposure (NO); Longest Free (2 years) "   Substance Use Topics     Alcohol use: Yes     Comment: Occasionally     Drug use: No     ZAKIYA-7 SCORE 8/10/2018 10/1/2020 11/22/2021   Total Score 2 6 0     PHQ 8/10/2018 10/1/2020 11/22/2021   PHQ-9 Total Score 2 9 1   Q9: Thoughts of better off dead/self-harm past 2 weeks Not at all Not at all Not at all     Last PHQ-9 11/22/2021   1.  Little interest or pleasure in doing things 0   2.  Feeling down, depressed, or hopeless 0   3.  Trouble falling or staying asleep, or sleeping too much 1   4.  Feeling tired or having little energy 0   5.  Poor appetite or overeating 0   6.  Feeling bad about yourself 0   7.  Trouble concentrating 0   8.  Moving slowly or restless 0   Q9: Thoughts of better off dead/self-harm past 2 weeks 0   PHQ-9 Total Score 1   Difficulty at work, home, or with people Not difficult at all     ZAKIYA-7  11/22/2021   1. Feeling nervous, anxious, or on edge 0   2. Not being able to stop or control worrying 0   3. Worrying too much about different things 0   4. Trouble relaxing 0   5. Being so restless that it is hard to sit still 0   6. Becoming easily annoyed or irritable 0   7. Feeling afraid, as if something awful might happen 0   ZAKIYA-7 Total Score 0   If you checked any problems, how difficult have they made it for you to do your work, take care of things at home, or get along with other people? Not difficult at all         How many servings of fruits and vegetables do you eat daily?  2-3    On average, how many sweetened beverages do you drink each day (Examples: soda, juice, sweet tea, etc.  Do NOT count diet or artificially sweetened beverages)?   2    How many days per week do you exercise enough to make your heart beat faster? 7    How many minutes a day do you exercise enough to make your heart beat faster? 10 - 19    How many days per week do you miss taking your medication? 0    Heartburn in worsening.  Has been taking omeprazole 20mg daily.  Continues to have issues..  She has not had an  "EGD.      Patient Active Problem List   Diagnosis     ACP (advance care planning)     Excessive or frequent menstruation     Encounter for IUD insertion     Uterine cramping     Surgery, elective     S/P vaginal hysterectomy     Dysplastic nevi     Past Surgical History:   Procedure Laterality Date      SECTION  1997     COLONOSCOPY  2012    Recall colonoscopy 5 years     COLONOSCOPY  2012    Recall colonoscopy 5 years     colonoscopy with polypectomy repeat 2 years  2007     CYSTOSCOPY N/A 2017    Procedure: CYSTOSCOPY;;  Surgeon: Antoni Trinh MD;  Location: [facility]     HYSTERECTOMY VAGINAL N/A 2017    Cervix removed. Procedure: HYSTERECTOMY VAGINAL;  VAGINAL HYSTERECTOMY, CYSTOSCOPY;  Surgeon: Antoni Trinh MD;  Location: [facility]     HYSTERECTOMY, PAP NO LONGER INDICATED N/A 2017    Cervix removed.       1995     wisdom teeth extraction         Social History     Tobacco Use     Smoking status: Former Smoker     Packs/day: 0.50     Years: 20.00     Pack years: 10.00     Types: Cigarettes     Start date: 1990     Quit date: 3/1/2017     Years since quittin.7     Smokeless tobacco: Never Used     Tobacco comment: Tried to Quit (YES); Passive Exposure (NO); Longest Free (2 years)   Substance Use Topics     Alcohol use: Yes     Comment: Occasionally     Family History   Problem Relation Age of Onset     Diabetes Mother      C.A.D. Father      Lipids Father         Hyperlipidemia     Hypertension Father          Current Outpatient Medications   Medication Sig Dispense Refill     Collagen-Vitamin C 1000-10 MG TABS        omeprazole (PRILOSEC) 20 MG DR capsule Take 1 capsule (20 mg) by mouth daily 90 capsule 1     venlafaxine (EFFEXOR) 37.5 MG tablet TAKE 1 TABLET (37.5 MG) BY MOUTH AT BEDTIME 90 tablet 0     Allergies   Allergen Reactions     Codeine Other (See Comments)     \"Out of it\"     Penicillins Other (See Comments)     Can't Breathe     Recent " "Labs   Lab Test 10/01/20  0854 08/08/18  1204 10/25/17  1002 02/21/17  0957 06/15/16  1019 05/15/15  1042 11/21/13  0803   *  --  108*  --  106*  --  85   HDL 48*  --  55  --  63  --  58   TRIG 190*  --  101  --  87  --  93   ALT  --  33  --   --   --  24 25   CR 0.66 0.66  --    < > 0.62 0.67 0.65   GFRESTIMATED >90 >90  --    < > >90  Non  GFR Calc   >90  Non  GFR Calc   >90   GFRESTBLACK >90 >90  --    < > >90   GFR Calc   >90   GFR Calc   >90   POTASSIUM 4.0 4.5  --    < > 4.1 4.6 4.5   TSH 0.76  --  1.00   < > 1.00 1.14 1.22    < > = values in this interval not displayed.      BP Readings from Last 3 Encounters:   11/22/21 110/64   10/01/20 128/72   08/10/18 115/78    Wt Readings from Last 3 Encounters:   11/22/21 73.9 kg (163 lb)   10/01/20 73 kg (161 lb)   08/10/18 72.8 kg (160 lb 6.4 oz)                   Review of Systems   Constitutional, HEENT, cardiovascular, pulmonary, gi and gu systems are negative, except as otherwise noted.      Objective    /64 (BP Location: Right arm, Patient Position: Chair, Cuff Size: Adult Regular)   Pulse 69   Temp 98.1  F (36.7  C) (Tympanic)   Ht 1.626 m (5' 4\")   Wt 73.9 kg (163 lb)   LMP 04/12/2017 (Approximate)   SpO2 98%   BMI 27.98 kg/m    Body mass index is 27.98 kg/m .  Physical Exam   GENERAL: healthy, alert and no distress  NECK: no adenopathy, no asymmetry, masses, or scars, thyroid normal to palpation and no carotid bruits  RESP: lungs clear to auscultation - no rales, rhonchi or wheezes  CV: regular rate and rhythm, normal S1 S2, no S3 or S4, no murmur, click or rub, no peripheral edema and peripheral pulses strong  MS: no gross musculoskeletal defects noted, no edema  PSYCH: mentation appears normal, affect normal/bright        "

## 2021-11-22 ENCOUNTER — OFFICE VISIT (OUTPATIENT)
Dept: FAMILY MEDICINE | Facility: OTHER | Age: 51
End: 2021-11-22
Attending: NURSE PRACTITIONER
Payer: COMMERCIAL

## 2021-11-22 VITALS
WEIGHT: 163 LBS | BODY MASS INDEX: 27.83 KG/M2 | HEIGHT: 64 IN | HEART RATE: 69 BPM | TEMPERATURE: 98.1 F | DIASTOLIC BLOOD PRESSURE: 64 MMHG | OXYGEN SATURATION: 98 % | SYSTOLIC BLOOD PRESSURE: 110 MMHG

## 2021-11-22 DIAGNOSIS — K21.9 GASTROESOPHAGEAL REFLUX DISEASE WITHOUT ESOPHAGITIS: ICD-10-CM

## 2021-11-22 DIAGNOSIS — Z83.3 FAMILY HISTORY OF TYPE 2 DIABETES MELLITUS: ICD-10-CM

## 2021-11-22 DIAGNOSIS — Z82.49 FAMILY HISTORY OF ISCHEMIC HEART DISEASE: ICD-10-CM

## 2021-11-22 DIAGNOSIS — N95.1 MENOPAUSAL SYNDROME (HOT FLASHES): Primary | ICD-10-CM

## 2021-11-22 PROCEDURE — 99214 OFFICE O/P EST MOD 30 MIN: CPT | Performed by: NURSE PRACTITIONER

## 2021-11-22 RX ORDER — VENLAFAXINE 75 MG/1
75 TABLET ORAL AT BEDTIME
Qty: 90 TABLET | Refills: 1 | Status: SHIPPED | OUTPATIENT
Start: 2021-11-22 | End: 2022-06-01

## 2021-11-22 RX ORDER — OMEPRAZOLE 40 MG/1
40 CAPSULE, DELAYED RELEASE ORAL DAILY
Qty: 90 CAPSULE | Refills: 1 | Status: SHIPPED | OUTPATIENT
Start: 2021-11-22 | End: 2022-06-01

## 2021-11-22 RX ORDER — SUCRALFATE ORAL 1 G/10ML
1 SUSPENSION ORAL 4 TIMES DAILY PRN
Qty: 420 ML | Refills: 1 | Status: SHIPPED | OUTPATIENT
Start: 2021-11-22 | End: 2023-03-14

## 2021-11-22 ASSESSMENT — ANXIETY QUESTIONNAIRES
6. BECOMING EASILY ANNOYED OR IRRITABLE: NOT AT ALL
4. TROUBLE RELAXING: NOT AT ALL
IF YOU CHECKED OFF ANY PROBLEMS ON THIS QUESTIONNAIRE, HOW DIFFICULT HAVE THESE PROBLEMS MADE IT FOR YOU TO DO YOUR WORK, TAKE CARE OF THINGS AT HOME, OR GET ALONG WITH OTHER PEOPLE: NOT DIFFICULT AT ALL
GAD7 TOTAL SCORE: 0
5. BEING SO RESTLESS THAT IT IS HARD TO SIT STILL: NOT AT ALL
1. FEELING NERVOUS, ANXIOUS, OR ON EDGE: NOT AT ALL
2. NOT BEING ABLE TO STOP OR CONTROL WORRYING: NOT AT ALL
3. WORRYING TOO MUCH ABOUT DIFFERENT THINGS: NOT AT ALL
7. FEELING AFRAID AS IF SOMETHING AWFUL MIGHT HAPPEN: NOT AT ALL

## 2021-11-22 ASSESSMENT — PAIN SCALES - GENERAL: PAINLEVEL: NO PAIN (0)

## 2021-11-22 ASSESSMENT — MIFFLIN-ST. JEOR: SCORE: 1349.36

## 2021-11-22 NOTE — PATIENT INSTRUCTIONS
"  Assessment & Plan     1. Menopausal syndrome (hot flashes)  Dose increase   - venlafaxine (EFFEXOR) 75 MG tablet; Take 1 tablet (75 mg) by mouth At Bedtime  Dispense: 90 tablet; Refill: 1  - TSH with free T4 reflex; Future    2. Gastroesophageal reflux disease without esophagitis  Dose increase, EGD  - omeprazole (PRILOSEC) 40 MG DR capsule; Take 1 capsule (40 mg) by mouth daily  Dispense: 90 capsule; Refill: 1  - sucralfate (CARAFATE) 1 GM/10ML suspension; Take 10 mLs (1 g) by mouth 4 times daily as needed (heartburn)  Dispense: 420 mL; Refill: 1  - Adult General Surg Referral; Future    3. Family history of type 2 diabetes mellitus  - Basic metabolic panel; Future    4. Family history of ischemic heart disease  - Lipid Profile; Future         BMI:   Estimated body mass index is 27.98 kg/m  as calculated from the following:    Height as of this encounter: 1.626 m (5' 4\").    Weight as of this encounter: 73.9 kg (163 lb).   Weight management plan: Discussed healthy diet and exercise guidelines        No follow-ups on file.    Kaila Henry NP  [facility]      "

## 2021-11-22 NOTE — NURSING NOTE
"Chief Complaint   Patient presents with     Anxiety       Initial /64 (BP Location: Right arm, Patient Position: Chair, Cuff Size: Adult Regular)   Pulse 69   Temp 98.1  F (36.7  C) (Tympanic)   Ht 1.626 m (5' 4\")   Wt 73.9 kg (163 lb)   LMP 04/12/2017 (Approximate)   SpO2 98%   BMI 27.98 kg/m   Estimated body mass index is 27.98 kg/m  as calculated from the following:    Height as of this encounter: 1.626 m (5' 4\").    Weight as of this encounter: 73.9 kg (163 lb).  Medication Reconciliation: complete  Nasima Leach LPN    "

## 2021-11-23 ENCOUNTER — LAB (OUTPATIENT)
Dept: LAB | Facility: OTHER | Age: 51
End: 2021-11-23
Payer: COMMERCIAL

## 2021-11-23 DIAGNOSIS — Z83.3 FAMILY HISTORY OF TYPE 2 DIABETES MELLITUS: ICD-10-CM

## 2021-11-23 DIAGNOSIS — N95.1 MENOPAUSAL SYNDROME (HOT FLASHES): ICD-10-CM

## 2021-11-23 DIAGNOSIS — Z82.49 FAMILY HISTORY OF ISCHEMIC HEART DISEASE: ICD-10-CM

## 2021-11-23 LAB
ANION GAP SERPL CALCULATED.3IONS-SCNC: 3 MMOL/L (ref 3–14)
BUN SERPL-MCNC: 13 MG/DL (ref 7–30)
CALCIUM SERPL-MCNC: 9 MG/DL (ref 8.5–10.1)
CHLORIDE BLD-SCNC: 108 MMOL/L (ref 94–109)
CHOLEST SERPL-MCNC: 258 MG/DL
CO2 SERPL-SCNC: 29 MMOL/L (ref 20–32)
CREAT SERPL-MCNC: 0.82 MG/DL (ref 0.52–1.04)
FASTING STATUS PATIENT QL REPORTED: YES
GFR SERPL CREATININE-BSD FRML MDRD: 84 ML/MIN/1.73M2
GLUCOSE BLD-MCNC: 121 MG/DL (ref 70–99)
HDLC SERPL-MCNC: 50 MG/DL
LDLC SERPL CALC-MCNC: 179 MG/DL
NONHDLC SERPL-MCNC: 208 MG/DL
POTASSIUM BLD-SCNC: 4.1 MMOL/L (ref 3.4–5.3)
SODIUM SERPL-SCNC: 140 MMOL/L (ref 133–144)
TRIGL SERPL-MCNC: 147 MG/DL
TSH SERPL DL<=0.005 MIU/L-ACNC: 1 MU/L (ref 0.4–4)

## 2021-11-23 PROCEDURE — 36415 COLL VENOUS BLD VENIPUNCTURE: CPT

## 2021-11-23 PROCEDURE — 84443 ASSAY THYROID STIM HORMONE: CPT

## 2021-11-23 PROCEDURE — 80061 LIPID PANEL: CPT

## 2021-11-23 PROCEDURE — 80048 BASIC METABOLIC PNL TOTAL CA: CPT

## 2021-11-23 ASSESSMENT — ANXIETY QUESTIONNAIRES: GAD7 TOTAL SCORE: 0

## 2021-11-23 ASSESSMENT — PATIENT HEALTH QUESTIONNAIRE - PHQ9: SUM OF ALL RESPONSES TO PHQ QUESTIONS 1-9: 1

## 2021-11-24 DIAGNOSIS — R73.01 ELEVATED FASTING GLUCOSE: Primary | ICD-10-CM

## 2021-12-03 ENCOUNTER — OFFICE VISIT (OUTPATIENT)
Dept: SURGERY | Facility: OTHER | Age: 51
End: 2021-12-03
Attending: NURSE PRACTITIONER
Payer: COMMERCIAL

## 2021-12-03 ENCOUNTER — PREP FOR PROCEDURE (OUTPATIENT)
Dept: SURGERY | Facility: OTHER | Age: 51
End: 2021-12-03

## 2021-12-03 VITALS
HEIGHT: 64 IN | WEIGHT: 163.4 LBS | BODY MASS INDEX: 27.9 KG/M2 | TEMPERATURE: 96.7 F | HEART RATE: 64 BPM | OXYGEN SATURATION: 98 % | DIASTOLIC BLOOD PRESSURE: 62 MMHG | SYSTOLIC BLOOD PRESSURE: 104 MMHG

## 2021-12-03 DIAGNOSIS — K21.9 ESOPHAGEAL REFLUX: Primary | ICD-10-CM

## 2021-12-03 DIAGNOSIS — K21.9 GASTROESOPHAGEAL REFLUX DISEASE WITHOUT ESOPHAGITIS: ICD-10-CM

## 2021-12-03 PROCEDURE — 99213 OFFICE O/P EST LOW 20 MIN: CPT | Performed by: CLINICAL NURSE SPECIALIST

## 2021-12-03 ASSESSMENT — PAIN SCALES - GENERAL: PAINLEVEL: NO PAIN (0)

## 2021-12-03 ASSESSMENT — MIFFLIN-ST. JEOR: SCORE: 1351.18

## 2021-12-03 NOTE — PROGRESS NOTES
Surgery Consult Clinic Note      RE: Raine Ayala  : 1970  VEDA: 12/3/2021      Chief Complaint:  Reflux    History of Present Illness:  I am seeing Raine for evaluation regarding reflux symptoms and consideration for esophagogastroduodenoscopy.  She has been treated for reflux with omeprazole, recently increased dosage from 20 to 40 mg with better control.  She has carafate for an as needed basis, but has not used it yet.  She reports modifying her diet to eliminate trigger foods and she will sometimes wake up at night with an acid taste in her mouth and burning in the back of her throat.  She has never had an EGD.  She doesn't use tobacco, drinks 1-2 cups of coffee a day, social ETOH drinker.  Previous abdominal surgeries include hysterectomy.  She specifically denies fever, chills, nausea, vomiting, chest pain, shortness of breath or palpitations.     Medical history:  Past Medical History:   Diagnosis Date     Lumbago 2013       Surgical history:  Past Surgical History:   Procedure Laterality Date      SECTION  1997     COLONOSCOPY  2012    Recall colonoscopy 5 years     COLONOSCOPY  2012    Recall colonoscopy 5 years     colonoscopy with polypectomy repeat 2 years  2007     CYSTOSCOPY N/A 2017    Procedure: CYSTOSCOPY;;  Surgeon: Antoni Trinh MD;  Location: [facility]     HYSTERECTOMY VAGINAL N/A 2017    Cervix removed. Procedure: HYSTERECTOMY VAGINAL;  VAGINAL HYSTERECTOMY, CYSTOSCOPY;  Surgeon: Antoni Trinh MD;  Location: [facility]     HYSTERECTOMY, PAP NO LONGER INDICATED N/A 2017    Cervix removed.       1995     wisdom teeth extraction         Family history:  Family History   Problem Relation Age of Onset     Diabetes Mother      C.A.D. Father      Lipids Father         Hyperlipidemia     Hypertension Father        Medications:  Prior to Admission medications    Medication Sig Start Date End Date Taking? Authorizing Provider    Collagen-Vitamin C 1000-10 MG TABS    Yes Reported, Patient   omeprazole (PRILOSEC) 40 MG DR capsule Take 1 capsule (40 mg) by mouth daily 21  Yes Kaila Henry NP   venlafaxine (EFFEXOR) 75 MG tablet Take 1 tablet (75 mg) by mouth At Bedtime 21  Yes Kaila Henry NP   sucralfate (CARAFATE) 1 GM/10ML suspension Take 10 mLs (1 g) by mouth 4 times daily as needed (heartburn)  Patient not taking: Reported on 12/3/2021 11/22/21   Kaila Henry NP   NO ACTIVE MEDICATIONS   13  Reported, Patient       Allergies:  The patient is allergic to codeine and penicillins.  .  Social history:  Social History     Tobacco Use     Smoking status: Former Smoker     Packs/day: 0.50     Years: 20.00     Pack years: 10.00     Types: Cigarettes     Start date: 1990     Quit date: 3/1/2017     Years since quittin.7     Smokeless tobacco: Never Used     Tobacco comment: Tried to Quit (YES); Passive Exposure (NO); Longest Free (2 years)   Substance Use Topics     Alcohol use: Yes     Comment: Occasionally     Marital status: .    Review of Systems:    Constitutional: Negative for fever, chills   HENT: Negative for ear pain, congestion, sore throat    Eyes: Negative for blurred vision, double vision   Respiratory: Negative for cough, hemoptysis, shortness of breath, wheezing and stridor.  Cardiovascular: Negative for chest pain, palpitations and orthopnea.   Gastrointestinal: Negative for nausea, vomiting, and blood in stool.   Genitourinary: Negative for urgency, frequency and hematuria.   Musculoskeletal: Negative for myalgias, back pain and joint pain.   Neurological: Negative for tingling, speech change    Endo/Heme/Allergies: Does not bruise/bleed easily.   Psychiatric/Behavioral: Negative for depression, suicidal ideas and hallucinations. The patient is not nervous/anxious.    Physical Examination:  /62   Pulse 64   Temp (!) 96.7  F (35.9  C)   Ht 1.626 m (5'  "4\")   Wt 74.1 kg (163 lb 6.4 oz)   LMP 04/12/2017 (Approximate)   SpO2 98%   BMI 28.05 kg/m    General: Alert and orientedx4, no acute distress, well-developed/well-nourished, ambulating without assistance  HEENT: normocephalic atraumatic, extraocular movements intact, PERRL Sclerae anicteric; Trachea mideline, no JVD  Chest:   Clear to auscultation bilaterally.  Cardiac: S1S2 , regular rate and rhythm without additional sounds  Abdomen: Soft, non-tender, non-distended  Extremities: Cursory exam unremarkable.  No peripheral edema noted.  Skin: Warm, dry, less than 2 sec cap refill  Neuro: CN 2-12 grossly intact, no focal deficit, GCS 15  Psych: Pleasant, calm, asks appropriate questions    Assessment/Plan:  #1 Esophagogastroduodenoscopy  #2 Reflux     We discussed lifestyle changes including refraining from coffee, tomato-based foods, fatty foods (especially fast food), citrus, peppermint, chocolate, alcohol, NSAIDS, tobacco, and carbonated beverages.  We discussed elevating the head of her bed, maintaining a healthy weight, and refraining from eating within 3 hours of lying down.  I educated her on taking omeprazole 30\"-60\" before eating.  She verbalizes understanding.      The indications, risks, benefits and technical aspects of esophagogastroduodenoscopy were reviewed, her questions were asked and answered. Antral biopsy for histologic examination will be performed and the place of H. pylori in gastritis was discussed. Preoperative preparation, npo after midnight preceding the date was discussed and a request made to hold aspirin containing agents one week prior to ameliorate antiplatelet effect. Will proceed with scheduling exam with Dr. Sotelo.      [facility]  [address] [place] [address]    [id number]    Referring Provider:  Kaila Henry NP  [address] [place] [address]     Primary Care " Provider:  Kaila Henry

## 2021-12-03 NOTE — NURSING NOTE
"Chief Complaint   Patient presents with     Consult For     GERD. Referred by Kevin Castaneda.       Initial /62   Pulse 64   Temp (!) 96.7  F (35.9  C)   Ht 1.626 m (5' 4\")   Wt 74.1 kg (163 lb 6.4 oz)   LMP 04/12/2017 (Approximate)   SpO2 98%   BMI 28.05 kg/m   Estimated body mass index is 28.05 kg/m  as calculated from the following:    Height as of this encounter: 1.626 m (5' 4\").    Weight as of this encounter: 74.1 kg (163 lb 6.4 oz).  Medication Reconciliation: complete  Carlita Lim LPN    "

## 2021-12-03 NOTE — PATIENT INSTRUCTIONS
Thank you for allowing JENI Kyle and our surgical team to participate in your care. Please call our health unit coordinator at [phone] with scheduling questions or the nurse at [phone] with any other questions or concerns.    Thank you for allowing our surgical team to participate in your care. Please review the following instructions to prepare for your upcoming Upper Endoscopy. You may call any of the numbers listed below with questions you may have.  [facility] Health Unit Coordinator: [phone]  [facility] Surgery Nurse (Delores): [phone]  Surgery Education Nurse: [phone]    Date of Procedure: 01/20/2022 with Dr. Sotelo  Admit time: Hospital surgery will call you the day before your procedure by 5pm with your admit time. If your surgery is on Monday, please expect a call on Friday. If we were unable to reach you by 5PM, you may call  [phone] for your arrival time.    COVID-19 test is needed 4 days before procedure. This testing is done at the upper level of [facility] (weekdays and weekends-East entrance) or at the [facility] (weekday mornings only).  Follow the signage for parking and bring your mobile phone (if you have one) to call the phone number ([phone]) on the sign outside the testing site for check-in. Stay in your vehicle until you are directed to enter. If you do not have a cell phone, please call [phone] prior to leaving home with a description of the vehicle you are taking.   This test has been scheduled for 01/16/2022 at 10:45 at the [facility] site.      Please call the Surgery Education Nurses 1 week prior to your surgery date at  [phone] for further instructions. Have your medication/allergy lists ready.    Do not take Aspirin (325mg), other NSAIDs (Ibuprofen, Motrin, Aleve, Celebrex, Naproxen, etc.) vitamins/supplements 7 days before your surgery.   If you are on blood thinners or insulin, please call your primary  care provider for instruction. If you are prescribed a daily 81 mg Aspirin, you may continue.    Please call the clinic surgery nurse or your regular doctor if you get sick within 2 weeks of the procedure. (vomiting, diarrhea, fever, cough, cold or any other symptoms of illness)    Do not eat any solid foods or milk products after 10pm the night prior to surgery.   You may have clear liquids only up until 4 hours prior to surgery.   Please see the table below for a list of clear liquids.     You will need a responsible adult available to drive you home and stay with you for at least 4 hours after you leave the hospital. You will not be allowed to drive yourself. If you need to take a taxi or the bus you must have a responsible person to ride with you (not the taxi/). Your procedure will be cancelled if you do not bring a responsible adult.    Questions or concerns can be directed to the clinic or surgery education nurse at any of the numbers listed above. If you have a scheduling or appointment question, please call the Health Unit Coordinator between 8am and 4pm Monday through Friday. After hours or on weekends, please call [phone] to postpone.         Clear Liquid Diet  You may have: Do NOT have:     ? Tea, coffee (no cream)   Milk or milk products such as ice cream, malts or shakes     ? Water, Vitamin Water, Smart Water, Coconut Water, PowerAde, Propel, Soda pop, (Sprite, 7 UP, Ginger Ale, Gatorade (not red or purple)   Red or purple drinks of any kind such as  Cranberry juice or grape juice.     ? Clear nutrition drinks (Resource Breeze, Ensure Active protein drink (peach flavor)   Red or purple Jell-O, Popsicles, Pablo-Aid, Sorbet and candy.     ? Jell-O, Popsicles (no milk or fruit pieces) or Italian ice (not red or purple)   Juices with pulp such as orange, grapefruit, pineapple or tomato juice               Honey, Sugar   Cream soups of any kind     ? Fat-free soup broth or bouillon   Alcohol      ? Plain hard candy, such as clear Life Savers (not red or purple)      ? Powdered Lemonade such as Crystal Light, Country Time      ? Clear juices and fruit-flavored drinks such as apple juice, white grape juice, Hi-C and Pablo-Aid (not red or purple)                  SURGERY HANDBOOK            Your surgery is scheduled:    Date: 01/20/2022  ________________________________    Time: hospital surgery will call the day prior to your procedure with arrival time  ________________________________      Surgeon's Name: Dr. Sotelo  _______________________        Pre-Op Physical Fax Numbers:          Pre-Admissions  [phone]        Your surgery is located at:  [place] [address]         Before Your Surgery  For Patients and Visitors at [place]    Welcome  As you get ready for surgery, you may have a lot of questions.  This brochure will help you know what to expect before and after surgery.  You and your family are the most important members of your health care team.  You will need to take an active role in your care.    Be sure to ask questions and learn all that you can about your surgery.  If you have any safety concerns, we urge you to tell a nurse as soon as possible.   This brochure is for information only.  It does not replace the advice of your doctor.  Always follow your doctor's advice.    If you or your  are deaf or hard of hearing, or prefer a language other than English, please let us know.  We have many free services, including interpreters and other aids to help you communicate. You may ask for help  through any member of your care team or by calling Language Services at [phone], option 2.    GETTING READY FOR SURGERY  Always follow your surgeon's instructions.  If you don't, your surgery could be canceled.  Please use the following checklist.  You have been scheduled for surgery and we would like to give you some information that  will assist in helping get the best possible outcome.      Before Surgery:   If for any reason you decide not to have the surgery, please contact your surgeon's office.  We can easily cancel or reschedule the procedure. If it is after hours, please call [phone].    Please keep in mind that the time of surgery is subject to change.  Make sure you have nothing to eat after midnight. If your surgery is later in the afternoon, this recommendation might change, but not until the day before surgery after the actual time of the surgery has been established.      Within 30 Days of Surgery:     Have a pre-surgery physical exam with your family doctor or partner.    If you use a [facility], all of your information from the pre-op physical will be in the Epic computer system.    Ask the doctor to send all of your results to the hospital before the surgery.  The doctor also may ask you to bring the results with you on the day of surgery or you can fax them to [phone].    Tell the doctor if:    You are allergic to latex or rubber  (Latex and rubber gloves are often used in medical care).    You are taking any medicines (including aspirin), vitamins (Vitamin E, Fish Oil, Omegas) or herbal products.  You will need to stop taking some medicines before surgery.    You have any medical problems (allergies, diabetes or heart disease, for example).    You have a pacemaker or an AICD (automatic implanted cardiac defibrillator).  If you do, please bring the ID card with you on the day of surgery.    You are a smoker.  People who smoke have a higher risk of infection after surgery.  Ask your doctor how you can quit smoking.    Within 7 days of Surgery:  Prior to your surgical procedure, a nurse will be contacting you to obtain a health history. A nurse will call you within a few days of surgery to go over these and other instructions.  If you do not hear from them, please call them at [phone].        Call your  insurance company.  Ask if you need pre-approval for your surgery.  If you do not have insurance, please let us know.    Arrange for someone to drive you home after surgery.  If you will have same-day surgery, you may not drive or take public transportation home by yourself.    Arrange for someone to stay with you for 24 hours after you go home.  This person must be a responsible adult (ie- Family member or friend).    The Day Before Surgery:     Call your surgeon if there are any changes in your health.  This includes signs of a cold or flu (such as a sore throat, runny nose, cough, rash or fever).    Do not smoke, drink alcohol or take over-the-counter medicine (unless your surgeon tells you to) for 24 hours before and after surgery.    If you take prescribed drugs:  You may need to stop them until after the surgery.  Follow your doctor's orders.  You may resume Aspirin and/or blood thinners after your surgery as directed by your physician/surgeon.    NO SOLID FOOD. CLEAR LIQUIDS ONLY.  Follow your surgeon's orders for eating and drinking.  You need to have an empty stomach before surgery.  This will make the surgery as safe as possible.  If you don't follow your doctor's orders, your surgery could be changed to another date.    The Day of Surgery:    Please remove deodorant, cologne, scented lotion, makeup, nail polish and jewelry (including rings and body piercings).  If you wear artificial nails, please remove at least one nail before coming to the hospital.    Wear clean, loose clothing to the hospital.    Bring these items to the hospital:  1. Your insurance card.  2. A list of all the medicines you take.  Include vitamins, minerals, herbs and over-the-counter drugs.  Note any drug allergies.  3. A copy of your advance health care directive, if you have one.  This tells us what treatment you would want -- and who would make health care decisions -- if you could no longer speak for yourself.  You may request  this form in advance or download it from [email or web address].  4. A case for any glasses, contact lenses, hearing aids or dentures.  5. Your inhaler or CPAP machine, if you use these at home.  Leave extra cash, jewelry and other valuables at home.    When You Arrive:  When you get to the hospital, you will:    Check in.  If you are under age 18, you must be with a parent or legal guardian.    Electronically sign consent forms, if you haven't already.  These electronic forms state that you know the risks and benefits of surgery.  When you sign the forms, you give us permission to do the surgery.  Do not sign them unless you understand what will happen during and after your surgery.  If you have any questions about your surgery, ask to speak with your doctor before you sign the forms.  If you don't understand the answers, ask again.    Receive a copy of the Patients Bill of Rights.  If you do not receive a copy, please ask for one.    Change into hospital clothes.  Your belongings will be placed in a bag.  We will return them to you after surgery.    Meet with the anesthesia provider.  He or she will tell you what kind of anesthesia (medicine) will be used to keep you comfortable during surgery.  Remember: It's okay to remind doctors and nurses to wash their hands before touching you.   In most cases, your surgeon will use a marker to write his or her initials on the surgery site.  This ensures that the exact site is operated on.  For safety reasons, we will ask you the same questions many times.  For example, we may ask your name and birth date over and over again.  Friends and family can stay with you until it's time for surgery.  While you're in surgery, they will be in the waiting area.  Please note that cell phones are not allowed in some patient care areas.  If you have questions about what will happen in the operating room, talk to your care team.    After Surgery:    We will move you to a recovery room  where we will watch you closely.  If you have any pain or discomfort, tell your nurse.  He or she will try to make you comfortable.      If you are staying overnight we will move you to your hospital room after you are awake.    If you are going home we will move you to another room.  Friends and family may be able to join you.  The length of time you spend in recovery depends on the type of medicine you received, your medical condition, and the type of surgery you had.    Going Home:  We will let you know when you're ready to leave the hospital.  Before you leave, we will tell you how to care for yourself at home.  If you do not understand something, please say so.  We will answer any questions you have.  We will then help you get ready to leave.  When you are discharged from the recovery room, the nurses will review instructions with you and your caregiver.  You must have an adult with you for the first 4 hours after you leave the hospital. Take it easy when you get home.  You will need some time to recover -- you may be more tired than you realize at first.  Rest and relax for rest of the day at home.  You'll feel better and heal faster if you take good care of yourself.  Symptoms of infection need to be reported to your surgery office. Please call your Surgeon.      Thank you for following these important instructions.

## 2021-12-14 ENCOUNTER — NURSE TRIAGE (OUTPATIENT)
Dept: FAMILY MEDICINE | Facility: OTHER | Age: 51
End: 2021-12-14
Payer: COMMERCIAL

## 2021-12-14 DIAGNOSIS — R35.0 URINARY FREQUENCY: Primary | ICD-10-CM

## 2021-12-14 NOTE — TELEPHONE ENCOUNTER
Pt calling and started to have s/s of UTI Sunday. Frequency,and discomfort-like she always has to go.When she wipes there light pink blood but not every time.No blood noted in the urine. NO fever,no flank pain. She used to have these as a kid and this is the same thing. Per care advice she needs to be seen in 24 hours.She would not be able to come to appt today or tomorrow d/t to work. She could come do a urine sample.Then updated will need to be scheduled lab for this.She states she does not know if she could do a scheduled lab.    Please advise.    Pended lab-please review if this is accurate lab.    Call back [phone] to leave message.    Loreta Rivas RN        Reason for Disposition    Blood in the urine is main symptom    Blood in urine  (Exception: could be normal menstrual bleeding)    Additional Information    Negative: Shock suspected (e.g., cold/pale/clammy skin, too weak to stand, low BP, rapid pulse)    Negative: Sounds like a life-threatening emergency to the triager    Negative: Followed a genital area injury    Negative: Followed a genital area injury (penis, scrotum)    Negative: Vaginal discharge    Negative: Pus (white, yellow) or bloody discharge from end of penis    Negative: [1] Taking antibiotic for urinary tract infection (UTI) AND [2] female    Negative: [1] Taking antibiotic for urinary tract infection (UTI) AND [2] male    Negative: [1] Discomfort (pain, burning or stinging) when passing urine AND [2] pregnant    Negative: [1] Discomfort (pain, burning or stinging) when passing urine AND [2] postpartum (from 0 to 6 weeks after delivery)    Negative: [1] Discomfort (pain, burning or stinging) when passing urine AND [2] female    Negative: [1] Discomfort (pain, burning or stinging) when passing urine AND [2] male    Negative: Pain or itching in the vulvar area    Negative: Pain in scrotum is main symptom    Negative: Symptoms arising from use of a urinary catheter (Verma or Coude)     "Negative: [1] Unable to urinate (or only a few drops) > 4 hours AND     [2] bladder feels very full (e.g., palpable bladder or strong urge to urinate)    Negative: [1] Decreased urination and [2] drinking very little AND [2] dehydration suspected (e.g., dark urine, no urine > 12 hours, very dry mouth, very lightheaded)    Negative: Patient sounds very sick or weak to the triager    Negative: Fever > 100.4 F (38.0 C)    Negative: Shock suspected (e.g., cold/pale/clammy skin, too weak to stand, low BP, rapid pulse)    Negative: Sounds like a life-threatening emergency to the triager    Negative: Urinary catheter, questions about    Negative: Recent back or abdominal injury    Negative: Recent genital injury    Negative: [1] Unable to urinate (or only a few drops) > 4 hours AND [2] bladder feels very full (e.g., palpable bladder or strong urge to urinate)    Negative: Passing pure blood or large blood clots (i.e., size > a dime) (Exception: shirley or small strands)    Negative: Fever > 100.4 F (38.0 C)    Negative: Patient sounds very sick or weak to the triager    Negative: Known sickle cell disease    Negative: Taking Coumadin (warfarin) or other strong blood thinner, or known bleeding disorder (e.g., thrombocytopenia)    Negative: Side (flank) or back pain present    Negative: Pain or burning with passing urine    Negative: [1] Pink or red-colored urine and likely from food (beets, rhubarb, red food dye) AND [2] lasts > 24 hours after stopping food    Answer Assessment - Initial Assessment Questions  1. SYMPTOM: \"What's the main symptom you're concerned about?\" (e.g., frequency, incontinence)      Frequency ,little bit of blood,pain with urination  2. ONSET: \"When did the  start?\"      Sunday  3. PAIN: \"Is there any pain?\" If so, ask: \"How bad is it?\" (Scale: 1-10; mild, moderate, severe)      Not so much pain just like bladder is full  4. CAUSE: \"What do you think is causing the symptoms?\"      UTI  5. OTHER " "SYMPTOMS: \"Do you have any other symptoms?\" (e.g., fever, flank pain, blood in urine, pain with urination)      Blood in urine just when she wipes-just a little  6. PREGNANCY: \"Is there any chance you are pregnant?\" \"When was your last menstrual period?\"      no    Protocols used: URINARY SYMPTOMS-A-AH, URINE - BLOOD IN-A-AH      "

## 2021-12-14 NOTE — TELEPHONE ENCOUNTER
Talked to patient and she is unable to leave work and give sample noted [facility] can [facility] if still having issues tomorrow to call and we would see what we can do for her

## 2021-12-14 NOTE — TELEPHONE ENCOUNTER
UA ordered   Should have a visit or a virtual once UA is done    [facility]  [phone]

## 2022-01-08 ENCOUNTER — HEALTH MAINTENANCE LETTER (OUTPATIENT)
Age: 52
End: 2022-01-08

## 2022-01-12 ENCOUNTER — ANESTHESIA EVENT (OUTPATIENT)
Dept: SURGERY | Facility: HOSPITAL | Age: 52
End: 2022-01-12
Payer: COMMERCIAL

## 2022-01-12 ASSESSMENT — LIFESTYLE VARIABLES: TOBACCO_USE: 1

## 2022-01-12 NOTE — ANESTHESIA PREPROCEDURE EVALUATION
"Anesthesia Pre-Procedure Evaluation    Patient: Raine Ayala   MRN: [id number] : 1970        Preoperative Diagnosis: Esophageal reflux [K21.9]    Procedure : Procedure(s):  ESOPHAGOGASTRODUODENOSCOPY (EGD)          Past Medical History:   Diagnosis Date     Lumbago 2013      Past Surgical History:   Procedure Laterality Date      SECTION  1997     COLONOSCOPY  2012    Recall colonoscopy 5 years     COLONOSCOPY  2012    Recall colonoscopy 5 years     colonoscopy with polypectomy repeat 2 years  2007     CYSTOSCOPY N/A 2017    Procedure: CYSTOSCOPY;;  Surgeon: Antoni Trinh MD;  Location: [facility] OR     HYSTERECTOMY VAGINAL N/A 2017    Cervix removed. Procedure: HYSTERECTOMY VAGINAL;  VAGINAL HYSTERECTOMY, CYSTOSCOPY;  Surgeon: Antoni Trinh MD;  Location: [facility] OR     HYSTERECTOMY, PAP NO LONGER INDICATED N/A 2017    Cervix removed.       1995     wisdom teeth extraction        Allergies   Allergen Reactions     Codeine Other (See Comments)     \"Out of it\"     Penicillins Other (See Comments)     Can't Breathe      Social History     Tobacco Use     Smoking status: Former Smoker     Packs/day: 0.50     Years: 20.00     Pack years: 10.00     Types: Cigarettes     Start date: 1990     Quit date: 3/1/2017     Years since quittin.8     Smokeless tobacco: Never Used     Tobacco comment: Tried to Quit (YES); Passive Exposure (NO); Longest Free (2 years)   Substance Use Topics     Alcohol use: Yes     Comment: Occasionally      Wt Readings from Last 1 Encounters:   21 74.1 kg (163 lb 6.4 oz)        Anesthesia Evaluation   Pt has had prior anesthetic. Type: Regional, MAC and General.    No history of anesthetic complications       ROS/MED HX  ENT/Pulmonary:     (+) tobacco use, Past use,     Neurologic:  - neg neurologic ROS     Cardiovascular:  - neg cardiovascular ROS     METS/Exercise Tolerance: >4 METS    Hematologic:  - neg hematologic "  ROS     Musculoskeletal: Comment: lumbago - neg musculoskeletal ROS     GI/Hepatic:  - neg GI/hepatic ROS     Renal/Genitourinary:  - neg Renal ROS     Endo:       Psychiatric/Substance Use:  - neg psychiatric ROS     Infectious Disease:  - neg infectious disease ROS     Malignancy:  - neg malignancy ROS     Other:  - neg other ROS          Physical Exam    Airway        Mallampati: I   TM distance: > 3 FB   Neck ROM: full   Mouth opening: > 3 cm    Respiratory Devices and Support         Dental       (+) implants      Cardiovascular   cardiovascular exam normal       Rhythm and rate: regular and normal     Pulmonary   pulmonary exam normal        breath sounds clear to auscultation           OUTSIDE LABS:  CBC:   Lab Results   Component Value Date    WBC 6.7 08/08/2018    WBC 12.3 (H) 04/27/2017    HGB 13.8 08/08/2018    HGB 10.7 (L) 04/27/2017    HCT 39.5 08/08/2018    HCT 31.0 (L) 04/27/2017     08/08/2018     04/27/2017     BMP:   Lab Results   Component Value Date     11/23/2021     10/01/2020    POTASSIUM 4.1 11/23/2021    POTASSIUM 4.0 10/01/2020    CHLORIDE 108 11/23/2021    CHLORIDE 107 10/01/2020    CO2 29 11/23/2021    CO2 27 10/01/2020    BUN 13 11/23/2021    BUN 14 10/01/2020    CR 0.82 11/23/2021    CR 0.66 10/01/2020     (H) 11/23/2021     (H) 10/01/2020     COAGS:   Lab Results   Component Value Date    PTT 27 02/21/2017    INR 0.91 02/21/2017     POC:   Lab Results   Component Value Date    HCG Negative 04/14/2017    HCGS Negative 04/26/2017     HEPATIC:   Lab Results   Component Value Date    ALBUMIN 3.8 08/08/2018    PROTTOTAL 7.4 08/08/2018    ALT 33 08/08/2018    AST 24 08/08/2018    ALKPHOS 48 08/08/2018    BILITOTAL 0.4 08/08/2018     OTHER:   Lab Results   Component Value Date    EDWARD 9.0 11/23/2021    TSH 1.00 11/23/2021       Anesthesia Plan    ASA Status:  2   NPO Status:  NPO Appropriate    Anesthesia Type: MAC.     - Reason for MAC: straight local  not clinically adequate   Induction: Intravenous, Propofol.   Maintenance: Balanced.        Consents    Anesthesia Plan(s) and associated risks, benefits, and realistic alternatives discussed. Questions answered and patient/representative(s) expressed understanding.     - Discussed: Risks, Benefits and Alternatives for BOTH SEDATION and the PROCEDURE were discussed     - Discussed with:  Patient      - Extended Intubation/Ventilatory Support Discussed: No.      - Patient is DNR/DNI Status: No    Use of blood products discussed: No .     Postoperative Care            Comments:                NEGRO Maldonado CRNA

## 2022-01-16 ENCOUNTER — OFFICE VISIT (OUTPATIENT)
Dept: FAMILY MEDICINE | Facility: OTHER | Age: 52
End: 2022-01-16
Attending: NURSE PRACTITIONER
Payer: COMMERCIAL

## 2022-01-16 DIAGNOSIS — Z20.822 COVID-19 RULED OUT: Primary | ICD-10-CM

## 2022-01-16 PROCEDURE — U0005 INFEC AGEN DETEC AMPLI PROBE: HCPCS

## 2022-01-16 PROCEDURE — U0003 INFECTIOUS AGENT DETECTION BY NUCLEIC ACID (DNA OR RNA); SEVERE ACUTE RESPIRATORY SYNDROME CORONAVIRUS 2 (SARS-COV-2) (CORONAVIRUS DISEASE [COVID-19]), AMPLIFIED PROBE TECHNIQUE, MAKING USE OF HIGH THROUGHPUT TECHNOLOGIES AS DESCRIBED BY CMS-2020-01-R: HCPCS

## 2022-01-17 LAB — SARS-COV-2 RNA RESP QL NAA+PROBE: NEGATIVE

## 2022-01-20 ENCOUNTER — ANESTHESIA (OUTPATIENT)
Dept: SURGERY | Facility: HOSPITAL | Age: 52
End: 2022-01-20
Payer: COMMERCIAL

## 2022-01-20 ENCOUNTER — HOSPITAL ENCOUNTER (OUTPATIENT)
Facility: HOSPITAL | Age: 52
Discharge: HOME | End: 2022-01-20
Attending: SURGERY | Admitting: SURGERY
Payer: COMMERCIAL

## 2022-01-20 VITALS
TEMPERATURE: 97.6 F | OXYGEN SATURATION: 98 % | HEART RATE: 64 BPM | BODY MASS INDEX: 28.51 KG/M2 | SYSTOLIC BLOOD PRESSURE: 131 MMHG | RESPIRATION RATE: 20 BRPM | WEIGHT: 167 LBS | DIASTOLIC BLOOD PRESSURE: 93 MMHG | HEIGHT: 64 IN

## 2022-01-20 PROCEDURE — 250N000011 HC RX IP 250 OP 636

## 2022-01-20 PROCEDURE — 43251 EGD REMOVE LESION SNARE: CPT | Performed by: SURGERY

## 2022-01-20 PROCEDURE — 250N000009 HC RX 250: Performed by: SURGERY

## 2022-01-20 PROCEDURE — 88305 TISSUE EXAM BY PATHOLOGIST: CPT | Mod: 26 | Performed by: PATHOLOGY

## 2022-01-20 PROCEDURE — 360N000075 HC SURGERY LEVEL 2, PER MIN: Performed by: SURGERY

## 2022-01-20 PROCEDURE — 999N000141 HC STATISTIC PRE-PROCEDURE NURSING ASSESSMENT: Performed by: SURGERY

## 2022-01-20 PROCEDURE — 370N000017 HC ANESTHESIA TECHNICAL FEE, PER MIN: Performed by: SURGERY

## 2022-01-20 PROCEDURE — 88305 TISSUE EXAM BY PATHOLOGIST: CPT | Mod: TC | Performed by: SURGERY

## 2022-01-20 PROCEDURE — 258N000003 HC RX IP 258 OP 636: Performed by: NURSE ANESTHETIST, CERTIFIED REGISTERED

## 2022-01-20 PROCEDURE — 43239 EGD BIOPSY SINGLE/MULTIPLE: CPT | Performed by: NURSE ANESTHETIST, CERTIFIED REGISTERED

## 2022-01-20 PROCEDURE — 272N000001 HC OR GENERAL SUPPLY STERILE: Performed by: SURGERY

## 2022-01-20 PROCEDURE — 43239 EGD BIOPSY SINGLE/MULTIPLE: CPT | Mod: 59 | Performed by: SURGERY

## 2022-01-20 PROCEDURE — 710N000012 HC RECOVERY PHASE 2, PER MINUTE: Performed by: SURGERY

## 2022-01-20 PROCEDURE — 250N000009 HC RX 250

## 2022-01-20 RX ORDER — SODIUM CHLORIDE, SODIUM LACTATE, POTASSIUM CHLORIDE, CALCIUM CHLORIDE 600; 310; 30; 20 MG/100ML; MG/100ML; MG/100ML; MG/100ML
INJECTION, SOLUTION INTRAVENOUS CONTINUOUS
Status: DISCONTINUED | OUTPATIENT
Start: 2022-01-20 | End: 2022-01-20 | Stop reason: HOSPADM

## 2022-01-20 RX ORDER — ONDANSETRON 4 MG/1
4 TABLET, ORALLY DISINTEGRATING ORAL EVERY 30 MIN PRN
Status: DISCONTINUED | OUTPATIENT
Start: 2022-01-20 | End: 2022-01-20 | Stop reason: HOSPADM

## 2022-01-20 RX ORDER — LIDOCAINE 40 MG/G
CREAM TOPICAL
Status: DISCONTINUED | OUTPATIENT
Start: 2022-01-20 | End: 2022-01-20 | Stop reason: HOSPADM

## 2022-01-20 RX ORDER — PROPOFOL 10 MG/ML
INJECTION, EMULSION INTRAVENOUS PRN
Status: DISCONTINUED | OUTPATIENT
Start: 2022-01-20 | End: 2022-01-20

## 2022-01-20 RX ORDER — LABETALOL 20 MG/4 ML (5 MG/ML) INTRAVENOUS SYRINGE
10
Status: DISCONTINUED | OUTPATIENT
Start: 2022-01-20 | End: 2022-01-20 | Stop reason: HOSPADM

## 2022-01-20 RX ORDER — ONDANSETRON 2 MG/ML
4 INJECTION INTRAMUSCULAR; INTRAVENOUS EVERY 30 MIN PRN
Status: DISCONTINUED | OUTPATIENT
Start: 2022-01-20 | End: 2022-01-20 | Stop reason: HOSPADM

## 2022-01-20 RX ORDER — LIDOCAINE HYDROCHLORIDE 20 MG/ML
INJECTION, SOLUTION INFILTRATION; PERINEURAL PRN
Status: DISCONTINUED | OUTPATIENT
Start: 2022-01-20 | End: 2022-01-20

## 2022-01-20 RX ORDER — HYDRALAZINE HYDROCHLORIDE 20 MG/ML
2.5-5 INJECTION INTRAMUSCULAR; INTRAVENOUS EVERY 10 MIN PRN
Status: DISCONTINUED | OUTPATIENT
Start: 2022-01-20 | End: 2022-01-20 | Stop reason: HOSPADM

## 2022-01-20 RX ADMIN — PROPOFOL 50 MG: 10 INJECTION, EMULSION INTRAVENOUS at 12:02

## 2022-01-20 RX ADMIN — PROPOFOL 50 MG: 10 INJECTION, EMULSION INTRAVENOUS at 11:59

## 2022-01-20 RX ADMIN — PROPOFOL 50 MG: 10 INJECTION, EMULSION INTRAVENOUS at 11:49

## 2022-01-20 RX ADMIN — PROPOFOL 20 MG: 10 INJECTION, EMULSION INTRAVENOUS at 12:12

## 2022-01-20 RX ADMIN — PROPOFOL 50 MG: 10 INJECTION, EMULSION INTRAVENOUS at 11:52

## 2022-01-20 RX ADMIN — PROPOFOL 50 MG: 10 INJECTION, EMULSION INTRAVENOUS at 12:08

## 2022-01-20 RX ADMIN — PROPOFOL 50 MG: 10 INJECTION, EMULSION INTRAVENOUS at 11:55

## 2022-01-20 RX ADMIN — PROPOFOL 50 MG: 10 INJECTION, EMULSION INTRAVENOUS at 11:50

## 2022-01-20 RX ADMIN — SODIUM CHLORIDE, POTASSIUM CHLORIDE, SODIUM LACTATE AND CALCIUM CHLORIDE: 600; 310; 30; 20 INJECTION, SOLUTION INTRAVENOUS at 10:42

## 2022-01-20 RX ADMIN — PROPOFOL 50 MG: 10 INJECTION, EMULSION INTRAVENOUS at 11:47

## 2022-01-20 RX ADMIN — PROPOFOL 50 MG: 10 INJECTION, EMULSION INTRAVENOUS at 11:58

## 2022-01-20 RX ADMIN — LIDOCAINE HYDROCHLORIDE 40 MG: 20 INJECTION, SOLUTION INFILTRATION; PERINEURAL at 11:47

## 2022-01-20 RX ADMIN — PROPOFOL 50 MG: 10 INJECTION, EMULSION INTRAVENOUS at 12:05

## 2022-01-20 ASSESSMENT — MIFFLIN-ST. JEOR: SCORE: 1367.51

## 2022-01-20 NOTE — ANESTHESIA CARE TRANSFER NOTE
Patient: Raine Ayala    Procedure: Procedure(s):  ESOPHAGOGASTRODUODENOSCOPY (EGD) WITH BIOPSY AND POLYPECTOMY       Diagnosis: Esophageal reflux [K21.9]  Diagnosis Additional Information: No value filed.    Anesthesia Type:   MAC     Note:    Oropharynx: oropharynx clear of all foreign objects and spontaneously breathing  Level of Consciousness: drowsy  Oxygen Supplementation: nasal cannula  Level of Supplemental Oxygen (L/min / FiO2): 1  Independent Airway: airway patency satisfactory and stable  Dentition: dentition unchanged  Vital Signs Stable: post-procedure vital signs reviewed and stable  Report to RN Given: handoff report given  Patient transferred to: Phase II    Handoff Report: Identifed the Patient, Identified the Reponsible Provider, Reviewed the pertinent medical history, Discussed the surgical course, Reviewed Intra-OP anesthesia mangement and issues during anesthesia, Set expectations for post-procedure period and Allowed opportunity for questions and acknowledgement of understanding      Vitals:  Vitals Value Taken Time   BP     Temp     Pulse     Resp     SpO2         Electronically Signed By: Kieran Fregoso  January 20, 2022  12:15 PM

## 2022-01-20 NOTE — ANESTHESIA POSTPROCEDURE EVALUATION
Patient: Raine Ayala    Procedure: Procedure(s):  ESOPHAGOGASTRODUODENOSCOPY (EGD) WITH BIOPSY AND POLYPECTOMY       Diagnosis:Esophageal reflux [K21.9]  Diagnosis Additional Information: No value filed.    Anesthesia Type:  MAC    Note:  Disposition: Outpatient   Postop Pain Control: Uneventful            Sign Out: Well controlled pain   PONV: No   Neuro/Psych: Uneventful            Sign Out: Acceptable/Baseline neuro status   Airway/Respiratory: Uneventful            Sign Out: Acceptable/Baseline resp. status   CV/Hemodynamics: Uneventful            Sign Out: Acceptable CV status; No obvious hypovolemia; No obvious fluid overload   Other NRE: NONE   DID A NON-ROUTINE EVENT OCCUR? No           Last vitals:  Vitals Value Taken Time   BP 80/48 01/20/22 1218   Temp     Pulse 62 01/20/22 1218   Resp     SpO2 94 % 01/20/22 1220   Vitals shown include unvalidated device data.    Electronically Signed By: NEGRO Medina CRNA  January 20, 2022  12:22 PM

## 2022-01-20 NOTE — DISCHARGE INSTRUCTIONS
UPPER ENDOSCOPY    AFTER THE PROCEDURE    You will return to Same Day Surgery to rest for about an hour before you go home.    The doctor will talk with you and your family.    A family member/friend may visit with you.    You may burp up any air remaining in your stomach.    You may feel dizzy or light-headed from the medicine.    Your nurse will go over the discharge instructions with you and your caregiver and answer any of your questions.      You will be contacted the next day to check on how you are doing.    If biopsies were taken, you will be contacted with the results usually within 3 days.  BACK AT HOME    Rest for an hour or two after you get home.    When your throat is no longer numb and you have a gag reflex, take a few sips of cool water.  If you can swallow comfortably, you may start eating again.    You may have a mild sore throat for the rest of the day.    You will be contacted with results by the surgeons office within a week. If not contacted in one week, call the surgeons office.  WHAT TO WATCH FOR:  Problems rarely occur after the exam, but it is important to be aware of the early signs of a complication.  Call your doctor immediately if you have:    Difficulty swallowing or breathing    Unusual pain in your stomach or chest    Vomiting blood or dark material that looks like coffee grounds    Black or tarry stools    Temperature over 101.5 degrees    MORE QUESTIONS?  Please ask your doctor or nurse before the exam begins  or call your doctor at the clinic.    IF YOU MUST CANCEL YOUR PROCEDURE THE EVENING/NIGHT BEFORE, PLEASE CALL HOSPITAL ADMITTING -[phone] OR TOLL FREE [phone], [phone].    Phone Numbers:  [facility] - [phone] [phone]  [facility] - [phone]  Surgery Patient Education - [phone] or toll free [phone]    Post-Anesthesia Patient Instructions    IMMEDIATELY FOLLOWING SURGERY:  Do not drive or operate machinery for the first twenty  four hours after surgery.  Do not make any important decisions for twenty four hours after surgery or while taking narcotic pain medications or sedatives.  If you develop intractable nausea and vomiting or a severe headache please notify your doctor immediately.    FOLLOW-UP:  Please make an appointment with your surgeon as instructed. You do not need to follow up with anesthesia unless specifically instructed to do so.    WOUND CARE INSTRUCTIONS (if applicable):  Keep a dry clean dressing on the anesthesia/puncture wound site if there is drainage.  Once the wound has quit draining you may leave it open to air.  Generally you should leave the bandage intact for twenty four hours unless there is drainage.  If the epidural site drains for more than 36-48 hours please call the anesthesia department.    QUESTIONS?:  Please feel free to call your physician or the hospital  if you have any questions, and they will be happy to assist you.

## 2022-01-20 NOTE — OP NOTE
Raine Ayala MRN# [id number]   YOB: 1970 Age: 51 year old      Date of Admission:  1/20/2022    Primary care provider: Kaila Henry    PREOPERATIVE DIAGNOSIS:  Acid Reflux       POSTOPERATIVE DIAGNOSES:    Mild gastritis, possible esophagitis, fundic polyps       PROCEDURE:  Esophagogastroduodenoscopy with cold forceps biopsies.       HISTORY OF PRESENT ILLNESS:  See clinic note      OPERATIVE FINDINGS:  The gastroesophageal junction was noted 38 cm from the incisors.  There was noted to be mild gastritis, duodenum normal, several large polyps, some removed for sampling, likely consistent with long term PPI use. Biopsies taken in esophagus and GE junction.     Specimen(s) submitted to pathology:  Antral biopsies, fundic polyps, GE junction, random esophageal    PROCEDURE:  With the patient in the supine position on the transport cart, IV sedation was administered by the nurse anesthetist.  The patient's correct identity and planned procedure were confirmed during a requisite timeout pause.  A bite block was placed and the fiberoptic endoscope was introduced and negotiated through the cricopharyngeus without difficulty.  The length of the esophagus was examined without findings.  The gastroesophageal junction was noted 38 cm from the incisors; the Z line was regular without ulceration, bleeding source. There was possible mild peptic stricture.   There was no  evidence of Cheek's change.  The stomach was entered and the pylorus was traversed easily.  The gastric mucosa had some erythema; there was no evidence of, ulcer or bleeding source. There were several large polyps two were removed with cold snare and retrieved.   The duodenum was without finding.  The endoscope was returned to the body of the stomach and retroflex confirmed the absence of abnormality in the cardia other than previously mentioned polyps.      Random cold forceps biopsies were obtained of the antrum for H. pylori.   Hemostasis was judged adequate on visualization.   The endoscope was returned to the GE junction.  Circumferential biopsies were obtained; hemostasis was satisfactory.  A second circumferential examination of the esophagus was performed on slow withdrawal of the endoscope without additional finding. Random esophageal biopsies were taken on the way out to rule out eosinophilic esophagitis.  The procedure was satisfactorially tolerated; there was no appreciable blood loss and the patient was returned to Day Surgery in good condition.      Richy Sotelo MD  1/20/2022  12:17 PM

## 2022-01-20 NOTE — H&P
"[facility] Surgery Consultation    CC:  Reflux    HPI:  I am seeing Raine for evaluation regarding reflux symptoms and consideration for esophagogastroduodenoscopy.  She has been treated for reflux with omeprazole, recently increased dosage from 20 to 40 mg with better control.  She has carafate for an as needed basis, but has not used it yet.  She reports modifying her diet to eliminate trigger foods and she will sometimes wake up at night with an acid taste in her mouth and burning in the back of her throat.  She has never had an EGD.  She doesn't use tobacco, drinks 1-2 cups of coffee a day, social ETOH drinker.  Previous abdominal surgeries include hysterectomy.  She specifically denies fever, chills, nausea, vomiting, chest pain, shortness of breath or palpitations.    Past Medical History:   Diagnosis Date     Lumbago 2013       Past Surgical History:   Procedure Laterality Date      SECTION  1997     COLONOSCOPY  2012    Recall colonoscopy 5 years     COLONOSCOPY  2012    Recall colonoscopy 5 years     colonoscopy with polypectomy repeat 2 years  2007     CYSTOSCOPY N/A 2017    Procedure: CYSTOSCOPY;;  Surgeon: Antoni Trinh MD;  Location: [facility] OR     HYSTERECTOMY VAGINAL N/A 2017    Cervix removed. Procedure: HYSTERECTOMY VAGINAL;  VAGINAL HYSTERECTOMY, CYSTOSCOPY;  Surgeon: Antoni Trinh MD;  Location: [facility] OR     HYSTERECTOMY, PAP NO LONGER INDICATED N/A 2017    Cervix removed.       1995     wisdom teeth extraction         Allergies   Allergen Reactions     Codeine Other (See Comments)     \"Out of it\"     Penicillins Other (See Comments)     Can't Breathe       Current Facility-Administered Medications   Medication     lactated ringers infusion     lidocaine (LMX4) cream     lidocaine 1 % 0.1-1 mL     sodium chloride (PF) 0.9% PF flush 3 mL     sodium chloride (PF) 0.9% PF flush 3 mL       HABITS:    Social History     Tobacco Use     " "Smoking status: Former Smoker     Packs/day: 0.50     Years: 20.00     Pack years: 10.00     Types: Cigarettes     Start date: 1990     Quit date: 3/1/2017     Years since quittin.8     Smokeless tobacco: Never Used     Tobacco comment: Tried to Quit (YES); Passive Exposure (NO); Longest Free (2 years)   Substance Use Topics     Alcohol use: Yes     Comment: Occasionally     Drug use: No       Family History   Problem Relation Age of Onset     Diabetes Mother      C.A.D. Father      Lipids Father         Hyperlipidemia     Hypertension Father        REVIEW OF SYSTEMS:  Ten point review of systems negative except those mentioned in the HPI.     OBJECTIVE:    /89   Temp 98.3  F (36.8  C) (Tympanic)   Resp 16   Ht 1.626 m (5' 4\")   Wt 75.8 kg (167 lb)   LMP 2017 (Approximate)   SpO2 97%   BMI 28.67 kg/m      GENERAL: Generally appears well, in no distress with appropriate affect.  HEENT:   Sclerae anicteric - normocephalic atraumatic   Respiratory:  No acute distress, no splinting, CTAB   Cardiovascular:  Regular Rate and Rhythm, no murmur   Abdomen: non-distended   :  deferred  Extremities:  Extremities normal. No deformities, edema, or skin discoloration.  Skin:  no suspicious lesions or rashes  Neurological: grossly intact  Psych:  Alert, oriented, affect appropriate with normal decision making ability.    IMPRESSION:    No new concerns since seen on 12/3, will proceed as planned.     PLAN:    Upper endoscopy.     Richy Sotelo MD,     2022  11:30 AM      "

## 2022-01-20 NOTE — OR NURSING
Discharge instructions given to patient and patient's spouse. No questions. Ambulated out of unit. Denies pain, nausea or dizziness.  IV dced after drinking and eating without nausea or vomiting.

## 2022-01-24 LAB
PATH REPORT.COMMENTS IMP SPEC: NORMAL
PATH REPORT.COMMENTS IMP SPEC: NORMAL
PATH REPORT.FINAL DX SPEC: NORMAL
PATH REPORT.GROSS SPEC: NORMAL
PATH REPORT.MICROSCOPIC SPEC OTHER STN: NORMAL
PATH REPORT.RELEVANT HX SPEC: NORMAL
PHOTO IMAGE: NORMAL

## 2022-05-31 DIAGNOSIS — N95.1 MENOPAUSAL SYNDROME (HOT FLASHES): ICD-10-CM

## 2022-05-31 DIAGNOSIS — K21.9 GASTROESOPHAGEAL REFLUX DISEASE WITHOUT ESOPHAGITIS: ICD-10-CM

## 2022-06-01 RX ORDER — VENLAFAXINE 75 MG/1
75 TABLET ORAL AT BEDTIME
Qty: 90 TABLET | Refills: 1 | Status: SHIPPED | OUTPATIENT
Start: 2022-06-01 | End: 2022-12-06

## 2022-06-01 RX ORDER — OMEPRAZOLE 40 MG/1
40 CAPSULE, DELAYED RELEASE ORAL DAILY
Qty: 90 CAPSULE | Refills: 1 | Status: SHIPPED | OUTPATIENT
Start: 2022-06-01 | End: 2022-12-06

## 2022-06-01 NOTE — TELEPHONE ENCOUNTER
Omeprazole      Last Written Prescription Date:  11/22/2021  Last Fill Quantity: 90,   # refills: 1  Last Office Visit: 11/22/2021  Future Office visit:         Venlafaxine      Last Written Prescription Date:  11/22/2021  Last Fill Quantity: 90,   # refills: 1  Last Office Visit: 11/22/2021  Future Office visit:

## 2022-08-10 NOTE — DISCHARGE INSTRUCTIONS
*CHEST PAIN, NONCARDIAC    Based on your visit today, the exact cause of your chest pain is not certain. Your condition does not seem serious and your pain does not appear to be coming from your heart. However, sometimes the signs of a serious problem take more time to appear. Therefore, please watch for the warning signs listed below.  HOME CARE:  1. Rest today and avoid strenuous activity.  2. Take any prescribed medicine as directed.  FOLLOW UP with your doctor in 1-3 days.   GET PROMPT MEDICAL ATTENTION if any of the following occur:    A change in the type of pain: if it feels different, becomes more severe, lasts longer, or begins to spread into your shoulder, arm, neck, jaw or back    Shortness of breath or increased pain with breathing    Cough with blood or dark colored sputum (phlegm)    Weakness, dizziness, or fainting    Fever over 101  F (38.3  C)    Swelling, pain or redness in one leg    2150-4493 [employer]. [address] [place] [address]. All rights reserved. This information is not intended as a substitute for professional medical care. Always follow your healthcare professional's instructions.  This information has been modified by your health care provider with permission from the publisher.     Valtrex Counseling: I discussed with the patient the risks of valacyclovir including but not limited to kidney damage, nausea, vomiting and severe allergy.  The patient understands that if the infection seems to be worsening or is not improving, they are to call.

## 2022-11-19 ENCOUNTER — HEALTH MAINTENANCE LETTER (OUTPATIENT)
Age: 52
End: 2022-11-19

## 2023-01-31 ENCOUNTER — ANCILLARY PROCEDURE (OUTPATIENT)
Dept: MAMMOGRAPHY | Facility: OTHER | Age: 53
End: 2023-01-31
Attending: NURSE PRACTITIONER
Payer: COMMERCIAL

## 2023-01-31 ENCOUNTER — TELEPHONE (OUTPATIENT)
Dept: MAMMOGRAPHY | Facility: OTHER | Age: 53
End: 2023-01-31

## 2023-01-31 DIAGNOSIS — Z12.31 VISIT FOR SCREENING MAMMOGRAM: ICD-10-CM

## 2023-01-31 PROCEDURE — 77063 BREAST TOMOSYNTHESIS BI: CPT | Mod: TC | Performed by: RADIOLOGY

## 2023-01-31 PROCEDURE — 77067 SCR MAMMO BI INCL CAD: CPT | Mod: TC | Performed by: RADIOLOGY

## 2023-03-10 NOTE — PROGRESS NOTES
"  Assessment & Plan     1. Gastroesophageal reflux disease without esophagitis  - sucralfate (CARAFATE) 1 GM/10ML suspension; Take 10 mLs (1 g) by mouth 4 times daily as needed (heartburn)  Dispense: 420 mL; Refill: 1  - omeprazole (PRILOSEC) 40 MG DR capsule; Take 1 capsule (40 mg) by mouth daily  Dispense: 90 capsule; Refill: 1    2. Menopausal syndrome (hot flashes)  Dose decrease   - venlafaxine (EFFEXOR) 50 MG tablet; Take 1 tablet (50 mg) by mouth At Bedtime  Dispense: 90 tablet; Refill: 1    3. Screen for colon cancer  - Colonoscopy Screening  Referral; Future    4. Elevated fasting glucose  - Hemoglobin A1c; Future  - Lipid Profile; Future  - TSH with free T4 reflex; Future  - Basic metabolic panel; Future    5. Chronic rhinitis  - cetirizine (ZYRTEC) 10 MG tablet; Take 1 tablet (10 mg) by mouth daily  Dispense: 90 tablet; Refill: 3      Review of the result(s) of each unique test - mammogram         BMI:   Estimated body mass index is 29.64 kg/m  as calculated from the following:    Height as of this encounter: 1.626 m (5' 4\").    Weight as of this encounter: 78.3 kg (172 lb 11.2 oz).   Weight management plan: Discussed healthy diet and exercise guidelines    Follow-up annually or as needed     Kaila Henry NP  [facility] [place] JUAN quintanilla is a 52 year old, presenting for the following health issues:  No chief complaint on file.      HPI     GERD/Heartburn  Onset: Many years     Description:     Burning in chest: no     Intensity: mild    Progression of Symptoms: same    Accompanying Signs & Symptoms:  Does it feel like food gets stuck: no   Nausea: no   Vomiting (bloody?): no   Abdominal Pain: no   Black-Tarry stools: no :  Bloody stools: no     History:   Previous ulcers: no     Precipitating factors:   Caffeine use: YES  Alcohol use: YES  NSAID/Aspirin use: no   Tobacco use: no   Worse with spicy foods.    Alleviating factors:  omeprazole and Carafate " "    Therapies Tried and outcome:as above, working well.         Hot flashes:  Has been taking effexor with reduction in hot flashes but has gained weight.  Her mother has diabetes, Raine has a history of elevated fasting glucose, due for A1c today.      She has been taking zyrtec daily for chronic allergies, requesting refill today to see if it will be covered by her insurance.      Recent Labs   Lab Test 11/23/21  0816 10/01/20  0854 08/08/18  1204 10/25/17  1002 06/15/16  1019 05/15/15  1042   * 152*  --  108*   < >  --    HDL 50 48*  --  55   < >  --    TRIG 147 190*  --  101   < >  --    ALT  --   --  33  --   --  24   CR 0.82 0.66 0.66  --    < > 0.67   GFRESTIMATED 84 >90 >90  --    < > >90  Non  GFR Calc     GFRESTBLACK  --  >90 >90  --    < > >90   GFR Calc     POTASSIUM 4.1 4.0 4.5  --    < > 4.6   TSH 1.00 0.76  --  1.00   < > 1.14    < > = values in this interval not displayed.      BP Readings from Last 3 Encounters:   03/14/23 116/72   01/20/22 131/93   12/03/21 104/62    Wt Readings from Last 3 Encounters:   03/14/23 78.3 kg (172 lb 11.2 oz)   01/20/22 75.8 kg (167 lb)   12/03/21 74.1 kg (163 lb 6.4 oz)                      Review of Systems   Constitutional, HEENT, cardiovascular, pulmonary, gi and gu systems are negative, except as otherwise noted.      Objective    /72 (BP Location: Right arm, Patient Position: Chair, Cuff Size: Adult Regular)   Pulse 59   Temp 97.2  F (36.2  C) (Tympanic)   Resp 16   Ht 1.626 m (5' 4\")   Wt 78.3 kg (172 lb 11.2 oz)   LMP 04/12/2017 (Approximate)   SpO2 99%   BMI 29.64 kg/m    Body mass index is 29.64 kg/m .  Physical Exam   GENERAL: healthy, alert and no distress  NECK: no adenopathy, no asymmetry, masses, or scars, thyroid normal to palpation and no carotid bruits  RESP: lungs clear to auscultation - no rales, rhonchi or wheezes  CV: regular rate and rhythm, normal S1 S2, no S3 or S4, no murmur, click or rub, " no peripheral edema and peripheral pulses strong  MS: no gross musculoskeletal defects noted, no edema  PSYCH: mentation appears normal, affect normal/bright

## 2023-03-14 ENCOUNTER — OFFICE VISIT (OUTPATIENT)
Dept: FAMILY MEDICINE | Facility: OTHER | Age: 53
End: 2023-03-14
Attending: NURSE PRACTITIONER
Payer: COMMERCIAL

## 2023-03-14 VITALS
BODY MASS INDEX: 29.49 KG/M2 | RESPIRATION RATE: 16 BRPM | OXYGEN SATURATION: 99 % | HEIGHT: 64 IN | TEMPERATURE: 97.2 F | SYSTOLIC BLOOD PRESSURE: 116 MMHG | HEART RATE: 59 BPM | WEIGHT: 172.7 LBS | DIASTOLIC BLOOD PRESSURE: 72 MMHG

## 2023-03-14 DIAGNOSIS — K21.9 GASTROESOPHAGEAL REFLUX DISEASE WITHOUT ESOPHAGITIS: Primary | ICD-10-CM

## 2023-03-14 DIAGNOSIS — R73.01 ELEVATED FASTING GLUCOSE: ICD-10-CM

## 2023-03-14 DIAGNOSIS — J31.0 CHRONIC RHINITIS: ICD-10-CM

## 2023-03-14 DIAGNOSIS — Z12.11 SCREEN FOR COLON CANCER: ICD-10-CM

## 2023-03-14 DIAGNOSIS — N95.1 MENOPAUSAL SYNDROME (HOT FLASHES): ICD-10-CM

## 2023-03-14 LAB
ANION GAP SERPL CALCULATED.3IONS-SCNC: 11 MMOL/L (ref 7–15)
BUN SERPL-MCNC: 12.1 MG/DL (ref 6–20)
CALCIUM SERPL-MCNC: 9.5 MG/DL (ref 8.6–10)
CHLORIDE SERPL-SCNC: 102 MMOL/L (ref 98–107)
CHOLEST SERPL-MCNC: 241 MG/DL
CREAT SERPL-MCNC: 0.74 MG/DL (ref 0.51–0.95)
DEPRECATED HCO3 PLAS-SCNC: 26 MMOL/L (ref 22–29)
EST. AVERAGE GLUCOSE BLD GHB EST-MCNC: 126 MG/DL
GFR SERPL CREATININE-BSD FRML MDRD: >90 ML/MIN/1.73M2
GLUCOSE SERPL-MCNC: 127 MG/DL (ref 70–99)
HBA1C MFR BLD: 6 %
HDLC SERPL-MCNC: 45 MG/DL
LDLC SERPL CALC-MCNC: 151 MG/DL
NONHDLC SERPL-MCNC: 196 MG/DL
POTASSIUM SERPL-SCNC: 4.1 MMOL/L (ref 3.4–5.3)
SODIUM SERPL-SCNC: 139 MMOL/L (ref 136–145)
TRIGL SERPL-MCNC: 226 MG/DL
TSH SERPL DL<=0.005 MIU/L-ACNC: 1.34 UIU/ML (ref 0.3–4.2)

## 2023-03-14 PROCEDURE — 36415 COLL VENOUS BLD VENIPUNCTURE: CPT | Performed by: NURSE PRACTITIONER

## 2023-03-14 PROCEDURE — 84443 ASSAY THYROID STIM HORMONE: CPT | Performed by: NURSE PRACTITIONER

## 2023-03-14 PROCEDURE — 80048 BASIC METABOLIC PNL TOTAL CA: CPT | Performed by: NURSE PRACTITIONER

## 2023-03-14 PROCEDURE — 80061 LIPID PANEL: CPT | Performed by: NURSE PRACTITIONER

## 2023-03-14 PROCEDURE — 83036 HEMOGLOBIN GLYCOSYLATED A1C: CPT | Performed by: NURSE PRACTITIONER

## 2023-03-14 PROCEDURE — 99214 OFFICE O/P EST MOD 30 MIN: CPT | Performed by: NURSE PRACTITIONER

## 2023-03-14 RX ORDER — ASCORBIC ACID/VITAMIN E/BIOTIN 7.5MG-125
TABLET,CHEWABLE ORAL
COMMUNITY
Start: 2021-01-01

## 2023-03-14 RX ORDER — VENLAFAXINE 50 MG/1
50 TABLET ORAL AT BEDTIME
Qty: 90 TABLET | Refills: 1 | Status: SHIPPED | OUTPATIENT
Start: 2023-03-14 | End: 2023-09-19

## 2023-03-14 RX ORDER — CETIRIZINE HYDROCHLORIDE 10 MG/1
10 TABLET ORAL DAILY
Qty: 90 TABLET | Refills: 3 | Status: SHIPPED | OUTPATIENT
Start: 2023-03-14 | End: 2024-03-25

## 2023-03-14 RX ORDER — CETIRIZINE HYDROCHLORIDE 10 MG/1
TABLET ORAL
COMMUNITY
Start: 2019-01-01 | End: 2023-03-14

## 2023-03-14 RX ORDER — OMEPRAZOLE 40 MG/1
40 CAPSULE, DELAYED RELEASE ORAL DAILY
Qty: 90 CAPSULE | Refills: 1 | Status: SHIPPED | OUTPATIENT
Start: 2023-03-14 | End: 2023-06-29

## 2023-03-14 RX ORDER — SUCRALFATE ORAL 1 G/10ML
1 SUSPENSION ORAL 4 TIMES DAILY PRN
Qty: 420 ML | Refills: 1 | Status: SHIPPED | OUTPATIENT
Start: 2023-03-14

## 2023-03-14 ASSESSMENT — PAIN SCALES - GENERAL: PAINLEVEL: NO PAIN (0)

## 2023-03-14 NOTE — PATIENT INSTRUCTIONS
"  Assessment & Plan     1. Gastroesophageal reflux disease without esophagitis  - sucralfate (CARAFATE) 1 GM/10ML suspension; Take 10 mLs (1 g) by mouth 4 times daily as needed (heartburn)  Dispense: 420 mL; Refill: 1  - omeprazole (PRILOSEC) 40 MG DR capsule; Take 1 capsule (40 mg) by mouth daily  Dispense: 90 capsule; Refill: 1    2. Menopausal syndrome (hot flashes)  Dose decrease   - venlafaxine (EFFEXOR) 50 MG tablet; Take 1 tablet (50 mg) by mouth At Bedtime  Dispense: 90 tablet; Refill: 1    3. Screen for colon cancer  - Colonoscopy Screening  Referral; Future    4. Elevated fasting glucose  - Hemoglobin A1c; Future  - Lipid Profile; Future  - TSH with free T4 reflex; Future  - Basic metabolic panel; Future    5. Chronic rhinitis  - cetirizine (ZYRTEC) 10 MG tablet; Take 1 tablet (10 mg) by mouth daily  Dispense: 90 tablet; Refill: 3      Review of the result(s) of each unique test - mammogram         BMI:   Estimated body mass index is 29.64 kg/m  as calculated from the following:    Height as of this encounter: 1.626 m (5' 4\").    Weight as of this encounter: 78.3 kg (172 lb 11.2 oz).   Weight management plan: Discussed healthy diet and exercise guidelines    Follow-up annually or as needed     Kaila Henry, NP  [facility]  "

## 2023-03-15 ENCOUNTER — TELEPHONE (OUTPATIENT)
Dept: FAMILY MEDICINE | Facility: OTHER | Age: 53
End: 2023-03-15

## 2023-03-15 DIAGNOSIS — Z12.11 ENCOUNTER FOR SCREENING COLONOSCOPY: Primary | ICD-10-CM

## 2023-03-15 NOTE — TELEPHONE ENCOUNTER
Screening Questions for the Scheduling of Screening Colonoscopies     (If Colonoscopy is diagnostic, Provider should review the chart before scheduling.)    Are you younger than 50 or older than 80?  No    Do you take aspirin or fish oil?  No (if yes, tell patient to stop 1 week prior to Colonoscopy)    Do you take warfarin (Coumadin), clopidogrel (Plavix), apixaban (Eliquis), dabigatram (Pradaxa), rivaroxaban (Xarelto) or any blood thinner? No    Do you use oxygen at home?  No    Do you have kidney disease? No    Are you on dialysis? No    Have you had a stroke or heart attack in the last year? No    Have you had a stent in your heart or any blood vessel in the last year? No    Have you had a transplant of any organ?  No    Have you had a colonoscopy or upper endoscopy (EGD) before?  YES. Date-.         Date of scheduled Colonoscopy: 5/19/23    Provider: Dr Sotelo     [facility]

## 2023-03-15 NOTE — TELEPHONE ENCOUNTER
Screening Questions for the Scheduling of Screening Colonoscopies     (If Colonoscopy is diagnostic, Provider should review the chart before scheduling.)    Are you younger than 50 or older than 80?  Non    Do you take aspirin or fish oil?  No (if yes, tell patient to stop 1 week prior to Colonoscopy)    Do you take warfarin (Coumadin), clopidogrel (Plavix), apixaban (Eliquis), dabigatram (Pradaxa), rivaroxaban (Xarelto) or any blood thinner? No    Do you use oxygen at home?  No    Do you have kidney disease? No    Are you on dialysis? No    Have you had a stroke or heart attack in the last year? No    Have you had a stent in your heart or any blood vessel in the last year? No    Have you had a transplant of any organ?  No    Have you had a colonoscopy or upper endoscopy (EGD) before?  YES. Date-.         Date of scheduled Colonoscopy: 5/19/23    Provider: Dr Sotelo     [facility]

## 2023-03-16 RX ORDER — BISACODYL 5 MG/1
10 TABLET, DELAYED RELEASE ORAL ONCE
Qty: 2 TABLET | Refills: 0 | Status: CANCELLED | OUTPATIENT
Start: 2023-03-16 | End: 2023-03-16

## 2023-03-16 RX ORDER — BISACODYL 5 MG/1
10 TABLET, DELAYED RELEASE ORAL ONCE
Qty: 2 TABLET | Refills: 0 | Status: SHIPPED | OUTPATIENT
Start: 2023-03-16 | End: 2023-03-16

## 2023-03-16 NOTE — TELEPHONE ENCOUNTER
Patient scheduled screening colonoscopy 05/19/23 with Dr. Sotelo, instruction booklet mailed today.

## 2023-03-17 ENCOUNTER — OFFICE VISIT (OUTPATIENT)
Dept: FAMILY MEDICINE | Facility: OTHER | Age: 53
End: 2023-03-17
Attending: NURSE PRACTITIONER
Payer: COMMERCIAL

## 2023-03-17 VITALS
OXYGEN SATURATION: 98 % | WEIGHT: 172 LBS | HEART RATE: 72 BPM | TEMPERATURE: 97.2 F | RESPIRATION RATE: 16 BRPM | HEIGHT: 64 IN | SYSTOLIC BLOOD PRESSURE: 118 MMHG | DIASTOLIC BLOOD PRESSURE: 70 MMHG | BODY MASS INDEX: 29.37 KG/M2

## 2023-03-17 DIAGNOSIS — R35.0 URINE FREQUENCY: Primary | ICD-10-CM

## 2023-03-17 DIAGNOSIS — R73.03 PRE-DIABETES: ICD-10-CM

## 2023-03-17 LAB
ALBUMIN UR-MCNC: NEGATIVE MG/DL
APPEARANCE UR: CLEAR
BACTERIA #/AREA URNS HPF: ABNORMAL /HPF
BILIRUB UR QL STRIP: NEGATIVE
CLUE CELLS: NORMAL
COLOR UR AUTO: YELLOW
GLUCOSE UR STRIP-MCNC: NEGATIVE MG/DL
HGB UR QL STRIP: ABNORMAL
KETONES UR STRIP-MCNC: NEGATIVE MG/DL
LEUKOCYTE ESTERASE UR QL STRIP: NEGATIVE
NITRATE UR QL: NEGATIVE
PH UR STRIP: 6.5 [PH] (ref 5–7)
RBC #/AREA URNS AUTO: ABNORMAL /HPF
SP GR UR STRIP: <=1.005 (ref 1–1.03)
SQUAMOUS #/AREA URNS AUTO: ABNORMAL /LPF
TRICHOMONAS, WET PREP: NORMAL
UROBILINOGEN UR STRIP-ACNC: 0.2 E.U./DL
WBC #/AREA URNS AUTO: ABNORMAL /HPF
WBC'S/HIGH POWER FIELD, WET PREP: NORMAL
YEAST, WET PREP: NORMAL

## 2023-03-17 PROCEDURE — 99214 OFFICE O/P EST MOD 30 MIN: CPT | Performed by: NURSE PRACTITIONER

## 2023-03-17 PROCEDURE — 87210 SMEAR WET MOUNT SALINE/INK: CPT | Performed by: NURSE PRACTITIONER

## 2023-03-17 PROCEDURE — 81001 URINALYSIS AUTO W/SCOPE: CPT | Performed by: NURSE PRACTITIONER

## 2023-03-17 ASSESSMENT — PAIN SCALES - GENERAL: PAINLEVEL: NO PAIN (0)

## 2023-03-17 NOTE — PATIENT INSTRUCTIONS
Assessment & Plan     1. Pre-diabetes  Carb counting reviewed   Exercise  - metFORMIN (GLUCOPHAGE) 500 MG tablet; Take 1 tablet (500 mg) by mouth 2 times daily (with meals)  Dispense: 120 tablet; Refill: 3  Metformin start:   Week 1:  One tablet (500mg) with breakfast   Week 2:  One tablet (500mg) with breakfast and supper   Week 3:  Two tablets (1000mg) with breakfast and one tablet (500mg) with supper   Week 4:  Two tablets (1000mg) with breakfast and supper - please stay on this dose.      2. Urine frequency  No UTI, will culture   - Wet prep  - UA with Microscopic reflex to Culture - [facility]  - UA Microscopic with Reflex to Culture      Return in about 3 months (around 6/17/2023) for pre-diabetes lipids.    Kaila Henry, NP  [facility] [place] [facility]

## 2023-04-26 ENCOUNTER — E-VISIT (OUTPATIENT)
Dept: URGENT CARE | Facility: CLINIC | Age: 53
End: 2023-04-26
Payer: COMMERCIAL

## 2023-04-26 DIAGNOSIS — R30.0 DIFFICULT OR PAINFUL URINATION: Primary | ICD-10-CM

## 2023-04-26 PROCEDURE — 99421 OL DIG E/M SVC 5-10 MIN: CPT | Performed by: PHYSICIAN ASSISTANT

## 2023-04-26 NOTE — PATIENT INSTRUCTIONS
Dear Raine Ayala,     After reviewing your responses, I would like you to come in for a urine test to make sure we treat you correctly. This urine test is to evaluate you for a possible urinary tract infection, and should be scheduled for today or tomorrow. Schedule a Lab Only appointment here.     Lab appointments are not available at most locations on the weekends. If no Lab Only appointment is available, you should be seen in any of our convenient Walk-in or Urgent Care Centers, which can be found on our website here.     You will receive instructions with your results in [employer] once they are available.     If your symptoms worsen, you develop pain in your back or stomach, develop fevers, or are not improving in 5 days, please contact your primary care provider for an appointment or visit a Walk-in or Urgent Care Center to be seen.     Thanks again for choosing us as your health care partner,     Veronica Cartwright PA-C

## 2023-04-27 ENCOUNTER — LAB (OUTPATIENT)
Dept: LAB | Facility: OTHER | Age: 53
End: 2023-04-27
Payer: COMMERCIAL

## 2023-04-27 DIAGNOSIS — R30.0 DIFFICULT OR PAINFUL URINATION: ICD-10-CM

## 2023-04-27 LAB
ALBUMIN UR-MCNC: NEGATIVE MG/DL
APPEARANCE UR: CLEAR
BILIRUB UR QL STRIP: NEGATIVE
CLUE CELLS: ABNORMAL
COLOR UR AUTO: YELLOW
GLUCOSE UR STRIP-MCNC: NEGATIVE MG/DL
HGB UR QL STRIP: NEGATIVE
KETONES UR STRIP-MCNC: NEGATIVE MG/DL
LEUKOCYTE ESTERASE UR QL STRIP: NEGATIVE
NITRATE UR QL: NEGATIVE
PH UR STRIP: 5.5 [PH] (ref 5–7)
SP GR UR STRIP: <=1.005 (ref 1–1.03)
TRICHOMONAS, WET PREP: ABNORMAL
UROBILINOGEN UR STRIP-ACNC: 1 E.U./DL
WBC'S/HIGH POWER FIELD, WET PREP: ABNORMAL
YEAST, WET PREP: ABNORMAL

## 2023-04-27 PROCEDURE — 81003 URINALYSIS AUTO W/O SCOPE: CPT

## 2023-04-27 PROCEDURE — 87210 SMEAR WET MOUNT SALINE/INK: CPT | Performed by: PHYSICIAN ASSISTANT

## 2023-06-29 ENCOUNTER — OFFICE VISIT (OUTPATIENT)
Dept: FAMILY MEDICINE | Facility: OTHER | Age: 53
End: 2023-06-29
Attending: NURSE PRACTITIONER
Payer: COMMERCIAL

## 2023-06-29 VITALS
DIASTOLIC BLOOD PRESSURE: 60 MMHG | WEIGHT: 154.5 LBS | TEMPERATURE: 97.7 F | BODY MASS INDEX: 26.52 KG/M2 | RESPIRATION RATE: 18 BRPM | SYSTOLIC BLOOD PRESSURE: 110 MMHG | HEART RATE: 68 BPM | OXYGEN SATURATION: 98 %

## 2023-06-29 DIAGNOSIS — K21.9 GASTROESOPHAGEAL REFLUX DISEASE WITHOUT ESOPHAGITIS: ICD-10-CM

## 2023-06-29 DIAGNOSIS — R73.03 PREDIABETES: Primary | ICD-10-CM

## 2023-06-29 DIAGNOSIS — N95.1 MENOPAUSAL SYNDROME (HOT FLASHES): ICD-10-CM

## 2023-06-29 DIAGNOSIS — R30.0 DYSURIA: ICD-10-CM

## 2023-06-29 DIAGNOSIS — N89.8 VAGINAL DRYNESS: ICD-10-CM

## 2023-06-29 LAB
ALBUMIN SERPL BCG-MCNC: 4.2 G/DL (ref 3.5–5.2)
ALBUMIN UR-MCNC: NEGATIVE MG/DL
ALP SERPL-CCNC: 49 U/L (ref 35–104)
ALT SERPL W P-5'-P-CCNC: 29 U/L (ref 0–50)
ANION GAP SERPL CALCULATED.3IONS-SCNC: 12 MMOL/L (ref 7–15)
APPEARANCE UR: CLEAR
AST SERPL W P-5'-P-CCNC: 19 U/L (ref 0–45)
BACTERIA #/AREA URNS HPF: ABNORMAL /HPF
BILIRUB SERPL-MCNC: 0.2 MG/DL
BILIRUB UR QL STRIP: NEGATIVE
BUN SERPL-MCNC: 12 MG/DL (ref 6–20)
CALCIUM SERPL-MCNC: 9.7 MG/DL (ref 8.6–10)
CHLORIDE SERPL-SCNC: 102 MMOL/L (ref 98–107)
CHOLEST SERPL-MCNC: 224 MG/DL
CLUE CELLS: ABNORMAL
COLOR UR AUTO: YELLOW
CREAT SERPL-MCNC: 0.67 MG/DL (ref 0.51–0.95)
DEPRECATED HCO3 PLAS-SCNC: 25 MMOL/L (ref 22–29)
EST. AVERAGE GLUCOSE BLD GHB EST-MCNC: 117 MG/DL
GFR SERPL CREATININE-BSD FRML MDRD: >90 ML/MIN/1.73M2
GLUCOSE SERPL-MCNC: 115 MG/DL (ref 70–99)
GLUCOSE UR STRIP-MCNC: NEGATIVE MG/DL
HBA1C MFR BLD: 5.7 %
HDLC SERPL-MCNC: 52 MG/DL
HGB UR QL STRIP: ABNORMAL
KETONES UR STRIP-MCNC: NEGATIVE MG/DL
LDLC SERPL CALC-MCNC: 144 MG/DL
LEUKOCYTE ESTERASE UR QL STRIP: ABNORMAL
NITRATE UR QL: NEGATIVE
NONHDLC SERPL-MCNC: 172 MG/DL
PH UR STRIP: 7 [PH] (ref 5–7)
POTASSIUM SERPL-SCNC: 4.2 MMOL/L (ref 3.4–5.3)
PROT SERPL-MCNC: 7 G/DL (ref 6.4–8.3)
RBC #/AREA URNS AUTO: ABNORMAL /HPF
SODIUM SERPL-SCNC: 139 MMOL/L (ref 136–145)
SP GR UR STRIP: <=1.005 (ref 1–1.03)
SQUAMOUS #/AREA URNS AUTO: ABNORMAL /LPF
TRICHOMONAS, WET PREP: ABNORMAL
TRIGL SERPL-MCNC: 141 MG/DL
UROBILINOGEN UR STRIP-ACNC: 0.2 E.U./DL
WBC #/AREA URNS AUTO: ABNORMAL /HPF
WBC'S/HIGH POWER FIELD, WET PREP: ABNORMAL
YEAST, WET PREP: ABNORMAL

## 2023-06-29 PROCEDURE — 83036 HEMOGLOBIN GLYCOSYLATED A1C: CPT | Performed by: NURSE PRACTITIONER

## 2023-06-29 PROCEDURE — 99214 OFFICE O/P EST MOD 30 MIN: CPT | Performed by: NURSE PRACTITIONER

## 2023-06-29 PROCEDURE — 80061 LIPID PANEL: CPT | Performed by: NURSE PRACTITIONER

## 2023-06-29 PROCEDURE — 80053 COMPREHEN METABOLIC PANEL: CPT | Performed by: NURSE PRACTITIONER

## 2023-06-29 PROCEDURE — 87210 SMEAR WET MOUNT SALINE/INK: CPT | Performed by: NURSE PRACTITIONER

## 2023-06-29 PROCEDURE — 36415 COLL VENOUS BLD VENIPUNCTURE: CPT | Performed by: NURSE PRACTITIONER

## 2023-06-29 PROCEDURE — 81001 URINALYSIS AUTO W/SCOPE: CPT | Performed by: NURSE PRACTITIONER

## 2023-06-29 RX ORDER — OMEPRAZOLE 40 MG/1
40 CAPSULE, DELAYED RELEASE ORAL DAILY
Qty: 90 CAPSULE | Refills: 1 | Status: SHIPPED | OUTPATIENT
Start: 2023-06-29 | End: 2023-12-29

## 2023-06-29 RX ORDER — METFORMIN HCL 500 MG
1000 TABLET, EXTENDED RELEASE 24 HR ORAL 2 TIMES DAILY WITH MEALS
Qty: 360 TABLET | Refills: 1 | Status: SHIPPED | OUTPATIENT
Start: 2023-06-29 | End: 2023-12-29 | Stop reason: SINTOL

## 2023-06-29 ASSESSMENT — PAIN SCALES - GENERAL: PAINLEVEL: NO PAIN (0)

## 2023-06-29 NOTE — PROGRESS NOTES
Assessment & Plan     1. Prediabetes  Change to extended release metformin.   - Hemoglobin A1c; Future  - metFORMIN (GLUCOPHAGE XR) 500 MG 24 hr tablet; Take 2 tablets (1,000 mg) by mouth 2 times daily (with meals)  Dispense: 360 tablet; Refill: 1  - Lipid Profile; Future  - Comprehensive metabolic panel; Future    2. Gastroesophageal reflux disease without esophagitis  Continue omeprazole.    - omeprazole (PRILOSEC) 40 MG DR capsule; Take 1 capsule (40 mg) by mouth daily  Dispense: 90 capsule; Refill: 1    3. Menopausal syndrome (hot flashes)  Continue effexor.      4. Dysuria  - Wet prep  - UA Macroscopic with reflex to Microscopic and Culture  - UA Microscopic with Reflex to Culture    5. Vaginal dryness  Positive family history of breast cancer.  Encouraged to try over the counter vaginal lubricants.          Kaila Henry, NP  [facility] - [place] JU[place] quintanilla is a 53 year old, presenting for the following health issues:  Lipids and pre diabetes    HPI     Hyperlipidemia Follow-Up    Are you regularly taking any medication or supplement to lower your cholesterol?   No    Are you having muscle aches or other side effects that you think could be caused by your cholesterol lowering medication?  No     The 10-year ASCVD risk score (Camille DK, et al., 2019) is: 1.6%    Values used to calculate the score:      Age: 53 years      Sex: Female      Is Non- : No      Diabetic: No      Tobacco smoker: No      Systolic Blood Pressure: 110 mmHg      Is BP treated: No      HDL Cholesterol: 45 mg/dL      Total Cholesterol: 241 mg/dL        Pre-diabetes    She has been taking metformin, she does experience gas, bloating and diarrhea.  She decreased her dose down to 500mg twice daily and symptoms remain.  Will change to extended release to see if symptoms improve.      Dysuria - wonders if bladder infection is starting.  Notes vaginal dryness and painful intercourse due  to dryness.      Lab Results   Component Value Date    A1C 6.0 03/14/2023     BP Readings from Last 3 Encounters:   06/29/23 110/60   03/17/23 118/70   03/14/23 116/72    Wt Readings from Last 3 Encounters:   06/29/23 70.1 kg (154 lb 8 oz)   03/17/23 78 kg (172 lb)   03/14/23 78.3 kg (172 lb 11.2 oz)               Review of Systems   Constitutional, HEENT, cardiovascular, pulmonary, gi and gu systems are negative, except as otherwise noted.      Objective    /60 (BP Location: Right arm, Patient Position: Sitting, Cuff Size: Adult Regular)   Pulse 68   Temp 97.7  F (36.5  C) (Tympanic)   Resp 18   Wt 70.1 kg (154 lb 8 oz)   LMP 04/12/2017 (Approximate)   SpO2 98%   BMI 26.52 kg/m    Body mass index is 26.52 kg/m .  Physical Exam   GENERAL: healthy, alert and no distress  NECK: no adenopathy, no asymmetry, masses, or scars, thyroid normal to palpation and no carotid bruits  RESP: lungs clear to auscultation - no rales, rhonchi or wheezes  CV: regular rate and rhythm, normal S1 S2, no S3 or S4, no murmur, click or rub, no peripheral edema and peripheral pulses strong  MS: no gross musculoskeletal defects noted, no edema  PSYCH: mentation appears normal, affect normal/bright

## 2023-07-14 NOTE — OR NURSING
Instructed to hold NSAIDs, ASA, vitamins & supplements starting today 7/14. Continue other prescribed medications a usual up to night before. May take omeprazole day of procedure

## 2023-07-18 ENCOUNTER — ANESTHESIA EVENT (OUTPATIENT)
Dept: SURGERY | Facility: HOSPITAL | Age: 53
End: 2023-07-18
Payer: COMMERCIAL

## 2023-07-18 NOTE — ANESTHESIA PREPROCEDURE EVALUATION
"Anesthesia Pre-Procedure Evaluation    Patient: Raine Ayala   MRN: [id number] : 1970        Procedure : Procedure(s):  COLONOSCOPY          Past Medical History:   Diagnosis Date     Lumbago 2013      Past Surgical History:   Procedure Laterality Date      SECTION  1997     COLONOSCOPY  2012    Recall colonoscopy 5 years     COLONOSCOPY  2012    Recall colonoscopy 5 years     colonoscopy with polypectomy repeat 2 years  2007     CYSTOSCOPY N/A 2017    Procedure: CYSTOSCOPY;;  Surgeon: Antoni Trinh MD;  Location: [facility]     ESOPHAGOSCOPY, GASTROSCOPY, DUODENOSCOPY (EGD), COMBINED N/A 2022    Procedure: ESOPHAGOGASTRODUODENOSCOPY (EGD) WITH BIOPSY AND POLYPECTOMY;  Surgeon: Richy Sotelo MD;  Location: [facility]     HYSTERECTOMY VAGINAL N/A 2017    Cervix removed. Procedure: HYSTERECTOMY VAGINAL;  VAGINAL HYSTERECTOMY, CYSTOSCOPY;  Surgeon: Antoni Trinh MD;  Location: [facility]     HYSTERECTOMY, PAP NO LONGER INDICATED N/A 2017    Cervix removed.       1995     wisdom teeth extraction        Allergies   Allergen Reactions     Codeine Other (See Comments)     \"Out of it\"     Penicillins Other (See Comments)     Can't Breathe      Social History     Tobacco Use     Smoking status: Former     Packs/day: 0.50     Years: 20.00     Pack years: 10.00     Types: Cigarettes     Start date: 1990     Quit date: 3/1/2017     Years since quittin.3     Smokeless tobacco: Never     Tobacco comments:     Tried to Quit (YES); Passive Exposure (NO); Longest Free (2 years)   Substance Use Topics     Alcohol use: Yes     Comment: Occasionally      Wt Readings from Last 1 Encounters:   23 70.1 kg (154 lb 8 oz)        Anesthesia Evaluation   Pt has had prior anesthetic. Type: Regional, MAC and General.        ROS/MED HX  ENT/Pulmonary:     (+) tobacco use (6 years ago quit date), Past use,     Neurologic:  - neg neurologic ROS   "   Cardiovascular:     (+) Dyslipidemia -----Previous cardiac testing   Echo: Date: Results:    Stress Test: Date: 2018 Results:  2018 negative stress test  ECG Reviewed: Date: Results:    Cath: Date: Results:      METS/Exercise Tolerance: >4 METS    Hematologic:  - neg hematologic  ROS     Musculoskeletal:  - neg musculoskeletal ROS     GI/Hepatic:     (+) GERD (none today ), Asymptomatic on medication, bowel prep,     Renal/Genitourinary:  - neg Renal ROS     Endo: Comment: Pre diabetes   A1C 6      Psychiatric/Substance Use:     (+) alcohol abuse (3 beers almost every night) Recreational drug usage: Cannabis (last use 1 week ago).    Infectious Disease:  - neg infectious disease ROS     Malignancy:  - neg malignancy ROS     Other:            Physical Exam    Airway        Mallampati: I   TM distance: > 3 FB   Neck ROM: full   Mouth opening: > 3 cm    Respiratory Devices and Support         Dental       (+) Minor Abnormalities - some fillings, tiny chips      Cardiovascular          Rhythm and rate: regular and normal     Pulmonary           breath sounds clear to auscultation           OUTSIDE LABS:  CBC:   Lab Results   Component Value Date    WBC 6.7 08/08/2018    WBC 12.3 (H) 04/27/2017    HGB 13.8 08/08/2018    HGB 10.7 (L) 04/27/2017    HCT 39.5 08/08/2018    HCT 31.0 (L) 04/27/2017     08/08/2018     04/27/2017     BMP:   Lab Results   Component Value Date     06/29/2023     03/14/2023    POTASSIUM 4.2 06/29/2023    POTASSIUM 4.1 03/14/2023    CHLORIDE 102 06/29/2023    CHLORIDE 102 03/14/2023    CO2 25 06/29/2023    CO2 26 03/14/2023    BUN 12.0 06/29/2023    BUN 12.1 03/14/2023    CR 0.67 06/29/2023    CR 0.74 03/14/2023     (H) 06/29/2023     (H) 03/14/2023     COAGS:   Lab Results   Component Value Date    PTT 27 02/21/2017    INR 0.91 02/21/2017     POC:   Lab Results   Component Value Date    HCG Negative 04/14/2017    HCGS Negative 04/26/2017     HEPATIC:   Lab  Results   Component Value Date    ALBUMIN 4.2 06/29/2023    PROTTOTAL 7.0 06/29/2023    ALT 29 06/29/2023    AST 19 06/29/2023    ALKPHOS 49 06/29/2023    BILITOTAL 0.2 06/29/2023     OTHER:   Lab Results   Component Value Date    A1C 5.7 (H) 06/29/2023    EDWARD 9.7 06/29/2023    TSH 1.34 03/14/2023       Anesthesia Plan    ASA Status:  2   NPO Status:  NPO Appropriate (0830 16oz gatorade, 0530 finished prep )    Anesthesia Type: MAC.     - Reason for MAC: straight local not clinically adequate              Consents    Anesthesia Plan(s) and associated risks, benefits, and realistic alternatives discussed. Questions answered and patient/representative(s) expressed understanding.    - Discussed:     - Discussed with:  Patient      - Extended Intubation/Ventilatory Support Discussed: No.      - Patient is DNR/DNI Status: No    Use of blood products discussed: No .     Postoperative Care            Comments:    Other Comments: Meet and greet today    Risks and benefits of MAC anesthetic discussed including dental damage, aspiration, loss of airway, conversion to general anesthetic, CV complications, MI, stroke, death. Pt wishes to proceed.                Xavi De León, NEGRO CRNA

## 2023-07-21 ENCOUNTER — ANESTHESIA (OUTPATIENT)
Dept: SURGERY | Facility: HOSPITAL | Age: 53
End: 2023-07-21
Payer: COMMERCIAL

## 2023-07-21 ENCOUNTER — HOSPITAL ENCOUNTER (OUTPATIENT)
Facility: HOSPITAL | Age: 53
Discharge: HOME | End: 2023-07-21
Attending: SURGERY | Admitting: SURGERY
Payer: COMMERCIAL

## 2023-07-21 VITALS
OXYGEN SATURATION: 99 % | TEMPERATURE: 97.1 F | RESPIRATION RATE: 18 BRPM | BODY MASS INDEX: 26.22 KG/M2 | WEIGHT: 148 LBS | SYSTOLIC BLOOD PRESSURE: 107 MMHG | DIASTOLIC BLOOD PRESSURE: 93 MMHG | HEIGHT: 63 IN | HEART RATE: 64 BPM

## 2023-07-21 PROCEDURE — 999N000141 HC STATISTIC PRE-PROCEDURE NURSING ASSESSMENT: Performed by: SURGERY

## 2023-07-21 PROCEDURE — 45380 COLONOSCOPY AND BIOPSY: CPT | Performed by: NURSE ANESTHETIST, CERTIFIED REGISTERED

## 2023-07-21 PROCEDURE — 88305 TISSUE EXAM BY PATHOLOGIST: CPT | Mod: TC | Performed by: SURGERY

## 2023-07-21 PROCEDURE — 370N000017 HC ANESTHESIA TECHNICAL FEE, PER MIN: Performed by: SURGERY

## 2023-07-21 PROCEDURE — 250N000011 HC RX IP 250 OP 636: Performed by: NURSE ANESTHETIST, CERTIFIED REGISTERED

## 2023-07-21 PROCEDURE — 88305 TISSUE EXAM BY PATHOLOGIST: CPT | Mod: 26 | Performed by: PATHOLOGY

## 2023-07-21 PROCEDURE — 258N000003 HC RX IP 258 OP 636: Performed by: NURSE ANESTHETIST, CERTIFIED REGISTERED

## 2023-07-21 PROCEDURE — 360N000075 HC SURGERY LEVEL 2, PER MIN: Performed by: SURGERY

## 2023-07-21 PROCEDURE — 710N000012 HC RECOVERY PHASE 2, PER MINUTE: Performed by: SURGERY

## 2023-07-21 PROCEDURE — 45380 COLONOSCOPY AND BIOPSY: CPT | Mod: PT | Performed by: SURGERY

## 2023-07-21 RX ORDER — PROPOFOL 10 MG/ML
INJECTION, EMULSION INTRAVENOUS CONTINUOUS PRN
Status: DISCONTINUED | OUTPATIENT
Start: 2023-07-21 | End: 2023-07-21

## 2023-07-21 RX ORDER — LIDOCAINE 40 MG/G
CREAM TOPICAL
Status: DISCONTINUED | OUTPATIENT
Start: 2023-07-21 | End: 2023-07-21 | Stop reason: HOSPADM

## 2023-07-21 RX ORDER — NALOXONE HYDROCHLORIDE 0.4 MG/ML
0.4 INJECTION, SOLUTION INTRAMUSCULAR; INTRAVENOUS; SUBCUTANEOUS
Status: DISCONTINUED | OUTPATIENT
Start: 2023-07-21 | End: 2023-07-21 | Stop reason: HOSPADM

## 2023-07-21 RX ORDER — FLUMAZENIL 0.1 MG/ML
0.2 INJECTION, SOLUTION INTRAVENOUS
Status: DISCONTINUED | OUTPATIENT
Start: 2023-07-21 | End: 2023-07-21 | Stop reason: HOSPADM

## 2023-07-21 RX ORDER — PROPOFOL 10 MG/ML
INJECTION, EMULSION INTRAVENOUS PRN
Status: DISCONTINUED | OUTPATIENT
Start: 2023-07-21 | End: 2023-07-21

## 2023-07-21 RX ORDER — LABETALOL 20 MG/4 ML (5 MG/ML) INTRAVENOUS SYRINGE
10
Status: DISCONTINUED | OUTPATIENT
Start: 2023-07-21 | End: 2023-07-21 | Stop reason: HOSPADM

## 2023-07-21 RX ORDER — ONDANSETRON 4 MG/1
4 TABLET, ORALLY DISINTEGRATING ORAL EVERY 30 MIN PRN
Status: DISCONTINUED | OUTPATIENT
Start: 2023-07-21 | End: 2023-07-21 | Stop reason: HOSPADM

## 2023-07-21 RX ORDER — ONDANSETRON 2 MG/ML
4 INJECTION INTRAMUSCULAR; INTRAVENOUS EVERY 30 MIN PRN
Status: DISCONTINUED | OUTPATIENT
Start: 2023-07-21 | End: 2023-07-21 | Stop reason: HOSPADM

## 2023-07-21 RX ORDER — FENTANYL CITRATE 50 UG/ML
50 INJECTION, SOLUTION INTRAMUSCULAR; INTRAVENOUS EVERY 5 MIN PRN
Status: DISCONTINUED | OUTPATIENT
Start: 2023-07-21 | End: 2023-07-21 | Stop reason: HOSPADM

## 2023-07-21 RX ORDER — HYDRALAZINE HYDROCHLORIDE 20 MG/ML
2.5-5 INJECTION INTRAMUSCULAR; INTRAVENOUS EVERY 10 MIN PRN
Status: DISCONTINUED | OUTPATIENT
Start: 2023-07-21 | End: 2023-07-21 | Stop reason: HOSPADM

## 2023-07-21 RX ORDER — SODIUM CHLORIDE, SODIUM LACTATE, POTASSIUM CHLORIDE, CALCIUM CHLORIDE 600; 310; 30; 20 MG/100ML; MG/100ML; MG/100ML; MG/100ML
INJECTION, SOLUTION INTRAVENOUS CONTINUOUS
Status: DISCONTINUED | OUTPATIENT
Start: 2023-07-21 | End: 2023-07-21 | Stop reason: HOSPADM

## 2023-07-21 RX ORDER — NALOXONE HYDROCHLORIDE 0.4 MG/ML
0.2 INJECTION, SOLUTION INTRAMUSCULAR; INTRAVENOUS; SUBCUTANEOUS
Status: DISCONTINUED | OUTPATIENT
Start: 2023-07-21 | End: 2023-07-21 | Stop reason: HOSPADM

## 2023-07-21 RX ADMIN — PROPOFOL 40 MG: 10 INJECTION, EMULSION INTRAVENOUS at 12:17

## 2023-07-21 RX ADMIN — SODIUM CHLORIDE, POTASSIUM CHLORIDE, SODIUM LACTATE AND CALCIUM CHLORIDE: 600; 310; 30; 20 INJECTION, SOLUTION INTRAVENOUS at 11:06

## 2023-07-21 RX ADMIN — PROPOFOL 200 MCG/KG/MIN: 10 INJECTION, EMULSION INTRAVENOUS at 12:14

## 2023-07-21 RX ADMIN — PROPOFOL 50 MG: 10 INJECTION, EMULSION INTRAVENOUS at 12:14

## 2023-07-21 ASSESSMENT — ACTIVITIES OF DAILY LIVING (ADL)
ADLS_ACUITY_SCORE: 35
ADLS_ACUITY_SCORE: 35

## 2023-07-21 ASSESSMENT — LIFESTYLE VARIABLES: TOBACCO_USE: 1

## 2023-07-21 NOTE — OP NOTE
Raine Ayala MRN# [id number]   YOB: 1970 Age: 53 year old      Date of Admission:  7/21/2023  Date of Service:   7/21/23    Primary care provider: Kaila Henry    PREOPERATIVE DIAGNOSIS:  Colon Cancer Screening        POSTOPERATIVE DIAGNOSIS:  Colon polyp x 1          PROCEDURE:  Colonoscopy with polypectomy           INDICATIONS:  Screening colonoscopy.      Specimen:   ID Type Source Tests Collected by Time Destination   1 : 25 cm Polyp Large Intestine, Colon SURGICAL PATHOLOGY EXAM Richy Sotelo MD 7/21/2023 12:34 PM        SURGEON: Richy Sotelo MD    DESCRIPTION OF PROCEDURE: Raine Ayala was brought into the endoscopy suite and placed in the left lateral decubitus position. After preprocedural pause and attended monitored anesthesia was administered, the external anus was inspected and was normal. Digital rectal exam was normal. The colonoscope was inserted and advanced under direct visualization to the level of the cecum which was identified by the appendiceal orifice and the ileocecal valve. The prep was excellent.. Upon slow withdrawal of the colonoscope, approximately 95% of the mucosa was directly visualized. There was small polyp at 25 cm removed with biopsy forceps.  The rest of the colon was without mucosal abnormality. There was no evidence of further polyps, inflammation, bleeding or AVMs. Retroflexion of the rectum was normal . The extra air was removed from the colon, and the colonoscope withdrawn. The patient tolerated the procedure well and was taken to postanesthesia care unit.     We invite the patient to return in 5  years for follow up screening evaluation, pending pathology related to prior history of polyps.    Richy Sotelo MD

## 2023-07-21 NOTE — ANESTHESIA POSTPROCEDURE EVALUATION
Patient: Raine Ayala    Procedure: Procedure(s):  COLONOSCOPY wt polypectomy       Anesthesia Type:  MAC    Note:  Disposition: Outpatient   Postop Pain Control: Uneventful            Sign Out: Well controlled pain   PONV: No   Neuro/Psych: Uneventful            Sign Out: Acceptable/Baseline neuro status   Airway/Respiratory: Uneventful            Sign Out: Acceptable/Baseline resp. status   CV/Hemodynamics: Uneventful            Sign Out: Acceptable CV status; No obvious hypovolemia; No obvious fluid overload   Other NRE: NONE   DID A NON-ROUTINE EVENT OCCUR? No           Last vitals:  Vitals Value Taken Time   /93 07/21/23 1255   Temp 97.1  F (36.2  C) 07/21/23 1255   Pulse 64 07/21/23 1255   Resp 18 07/21/23 1255   SpO2 99 % 07/21/23 1300       Electronically Signed By: NEGRO STOREY CRNA  July 21, 2023  1:09 PM

## 2023-07-21 NOTE — ANESTHESIA CARE TRANSFER NOTE
Patient: Raine Ayala    Procedure: Procedure(s):  COLONOSCOPY wt polypectomy       Diagnosis: Encounter for screening colonoscopy [Z12.11]  Diagnosis Additional Information: No value filed.    Anesthesia Type:   MAC     Note:    Oropharynx: spontaneously breathing  Level of Consciousness: awake  Oxygen Supplementation: room air    Independent Airway: airway patency satisfactory and stable  Dentition: dentition unchanged  Vital Signs Stable: post-procedure vital signs reviewed and stable  Report to RN Given: handoff report given  Patient transferred to: Phase II    Handoff Report: Identifed the Patient, Identified the Reponsible Provider, Reviewed the pertinent medical history, Discussed the surgical course, Reviewed Intra-OP anesthesia mangement and issues during anesthesia, Set expectations for post-procedure period and Allowed opportunity for questions and acknowledgement of understanding      Vitals:  Vitals Value Taken Time   BP     Temp     Pulse     Resp     SpO2         Electronically Signed By: NEGRO Logan CRNA  July 21, 2023  12:36 PM

## 2023-07-21 NOTE — H&P
Surgery Consult Clinic Note      RE: Raine Ayala  : 1970      Chief Complaint:  Colon cancer screening    History of Present Illness:  I am seeing Raine Ayala at the request of Kevin Castaneda NP for evaluation regarding meet and greet screening colonoscopy.  She had two previous colonoscopies  (1 adenomatous polyp) and  (normal colon).  She has a distant cousin with colon cancer.   She denies blood in stool, changes in bowel habits, weight loss, abdominal pain.  Previous abdominal surgeries include hysterectomy.   She has no questions regarding  bowel prep.  Reports passing clear liquid stools today.     She specifically denies fevers, chills, nausea, vomiting, chest pain, shortness of breath, palpitations, sore throat, cough, or generalized feeling ill.       Medical history:  Past Medical History:   Diagnosis Date     Lumbago 2013       Surgical history:  Past Surgical History:   Procedure Laterality Date      SECTION  1997     COLONOSCOPY  2012    Recall colonoscopy 5 years     COLONOSCOPY  2012    Recall colonoscopy 5 years     colonoscopy with polypectomy repeat 2 years  2007     CYSTOSCOPY N/A 2017    Procedure: CYSTOSCOPY;;  Surgeon: Antoni Trinh MD;  [facility]: [facility]     ESOPHAGOSCOPY, GASTROSCOPY, DUODENOSCOPY (EGD), COMBINED N/A 2022    Procedure: ESOPHAGOGASTRODUODENOSCOPY (EGD) WITH BIOPSY AND POLYPECTOMY;  Surgeon: Richy Sotelo MD;  Location: [facility]     HYSTERECTOMY VAGINAL N/A 2017    Cervix removed. Procedure: HYSTERECTOMY VAGINAL;  VAGINAL HYSTERECTOMY, CYSTOSCOPY;  Surgeon: Antoni Trinh MD;  Location: [facility]     HYSTERECTOMY, PAP NO LONGER INDICATED N/A 2017    Cervix removed.       1995     wisdom teeth extraction         Family history:  Family History   Problem Relation Age of Onset     Breast Cancer Mother 73     Diabetes Mother      C.A.D. Father      Lipids Father         Hyperlipidemia      Hypertension Father      Ovarian Cancer Maternal Aunt        Medications:  Prior to Admission medications    Medication Sig Start Date End Date Taking? Authorizing Provider   Biotin w/ Vitamins C & E (HAIR SKIN & NAILS GUMMIES) 1250-7.5-7.5 MCG-MG-UNT CHEW  21  Yes Reported, Patient   cetirizine (ZYRTEC) 10 MG tablet Take 1 tablet (10 mg) by mouth daily 3/14/23  Yes Kaila Henry NP   metFORMIN (GLUCOPHAGE XR) 500 MG 24 hr tablet Take 2 tablets (1,000 mg) by mouth 2 times daily (with meals) 23  Yes Kaila Henry NP   omeprazole (PRILOSEC) 40 MG DR capsule Take 1 capsule (40 mg) by mouth daily 23  Yes Kaila Henry NP   sucralfate (CARAFATE) 1 GM/10ML suspension Take 10 mLs (1 g) by mouth 4 times daily as needed (heartburn) 3/14/23  Yes Kaila Henry NP   venlafaxine (EFFEXOR) 50 MG tablet Take 1 tablet (50 mg) by mouth At Bedtime 3/14/23  Yes Kaila Henry NP   NO ACTIVE MEDICATIONS   13  Reported, Patient       Allergies:  The patient is allergic to codeine and penicillins.  .  Social history:  Social History     Tobacco Use     Smoking status: Former     Packs/day: 0.50     Years: 20.00     Pack years: 10.00     Types: Cigarettes     Start date: 1990     Quit date: 3/1/2017     Years since quittin.3     Smokeless tobacco: Never     Tobacco comments:     Tried to Quit (YES); Passive Exposure (NO); Longest Free (2 years)   Substance Use Topics     Alcohol use: Yes     Comment: Occasionally     Marital status: .    Review of Systems:    Constitutional: Negative for fever, chills.   HENT: Negative for ear pain, congestion, sore throat, and ear discharge.    Eyes: Negative for blurred vision, double vision.   Respiratory: Negative for cough, hemoptysis, shortness of breath, wheezing and stridor.    Cardiovascular: Negative for chest pain, palpitations and orthopnea.   Gastrointestinal: Negative for heartburn, nausea, vomiting,  "abdominal pain and blood in stool.   Genitourinary: Negative for urgency, frequency   Musculoskeletal: Negative for myalgias, back pain and joint pain.   Neurological: Negative for tingling, speech change and headaches.   Endo/Heme/Allergies: Does not bruise/bleed easily.   Psychiatric/Behavioral: Negative for depression, suicidal ideas and hallucinations. The patient is not nervous/anxious.    Physical Examination:  /87   Pulse 58   Temp 98.2  F (36.8  C) (Tympanic)   Resp 18   Ht 1.6 m (5' 3\")   Wt 67.1 kg (148 lb)   LMP 04/12/2017 (Approximate)   SpO2 98%   BMI 26.22 kg/m    General: Alert and orientedx4, no acute distress, well-developed/well-nourished, ambulating without assistance  HEENT: normocephalic atraumatic, extraocular movements intact, sclerae anicteric, Trachea mideline  Chest:   Clear to auscultation bilaterally.  Cardiac: S1S2 , regular rate and rhythm without additional sounds  Abdomen: Soft, non-tender, non-distended  Extremities: Cursory exam unremarkable.  No peripheral edema noted.  Skin: Warm, dry, less than 2 sec cap refill  Neuro: CN 2-12 grossly intact, no focal deficit, GCS 15  Psych: Pleasant, calm, asks appropriate questions      Assessment/Plan:  #1 Colonoscopy    Raine Ayala and I had a discussion about colonoscopies.  The indications, risks, benefits, althernatives and technical aspects of whole colon colonoscopy were outlined with risks including, but not limited to, perforation, bleeding and inability to visualize entire colon.  Management of each was reviewed including the risk for life saving surgery and possible admittance to the hospital.  The need of mechanical preparation of the colon was reviewed along with the use of monitored anesthetic care.  Her questions were asked and answered.  We will proceed with colonoscopy with Dr. Sotelo as scheduled.  Ingris Sharpe [facility]  [address] [place] [address]    " [phone]    Referring Provider:  No referring provider defined for this encounter.     Primary Care Provider:  Kaila Henry

## 2023-07-21 NOTE — DISCHARGE INSTRUCTIONS
You had one colon polyp removed today.  Dr. Sotelo's office will call you with the pathology results when they become available.            INSTRUCTIONS AFTER COLONOSCOPY    WHEN YOU ARE BACK HOME:  Plan to rest for an hour or two after you get home.  You may have some cramping or pressure until you pass gas.  You may resume your regular medications.  Eat a small, light meal at first, and then gradually return to normal meal sizes.  You will be contacted the next day to see how you are doing.  If you had a polyp removed:  Slight bleeding may occur.  You may have a slight blood stain on the toilet paper after a bowel movement.  To lessen the chance of bleeding, avoid heavy exercise for ONE WEEK.  This includes heavy lifting, vigorous sport activities, and heavy physical labor.  You may resume your normal sexual activity.    Avoid aspirin or aspirin products if instructed by your doctor.  If there is a polyp or biopsy, you will be contacted with results within one week.     WHAT TO WATCH FOR:  Problems rarely occur after the exam; however, it is important for you to watch for early signs of possible problems.  If you have   Unusual pain in your abdomen  Nausea and vomiting that persists  Excessive bleeding  Black or bloody bowel movements  Fever or temperature above 100.6 F  Please call your doctor ([facility] [phone]) or go to the nearest hospital emergency room.        Post-Anesthesia Patient Instructions    IMMEDIATELY FOLLOWING SURGERY:  Do not drive or operate machinery for the first twenty four hours after surgery.  Do not make any important decisions for twenty four hours after surgery or while taking narcotic pain medications or sedatives.  If you develop intractable nausea and vomiting or a severe headache please notify your doctor immediately.    FOLLOW-UP:  Please make an appointment with your surgeon as instructed. You do not need to follow up with anesthesia unless specifically instructed to do  cipro is a good choice for the Proteus bacteria so.    WOUND CARE INSTRUCTIONS (if applicable):  Keep a dry clean dressing on the anesthesia/puncture wound site if there is drainage.  Once the wound has quit draining you may leave it open to air.  Generally you should leave the bandage intact for twenty four hours unless there is drainage.  If the epidural site drains for more than 36-48 hours please call the anesthesia department.    QUESTIONS?:  Please feel free to call your physician or the hospital  if you have any questions, and they will be happy to assist you.

## 2023-07-25 LAB
PATH REPORT.COMMENTS IMP SPEC: NORMAL
PATH REPORT.FINAL DX SPEC: NORMAL
PATH REPORT.GROSS SPEC: NORMAL
PATH REPORT.MICROSCOPIC SPEC OTHER STN: NORMAL
PATH REPORT.RELEVANT HX SPEC: NORMAL
PHOTO IMAGE: NORMAL

## 2023-09-17 DIAGNOSIS — N95.1 MENOPAUSAL SYNDROME (HOT FLASHES): ICD-10-CM

## 2023-09-19 RX ORDER — VENLAFAXINE 50 MG/1
50 TABLET ORAL AT BEDTIME
Qty: 90 TABLET | Refills: 0 | Status: SHIPPED | OUTPATIENT
Start: 2023-09-19 | End: 2023-12-19

## 2023-09-19 NOTE — TELEPHONE ENCOUNTER
Effexor      Last Written Prescription Date:  3/14/23  Last Fill Quantity: 90,   # refills: 1  Last Office Visit: 6/29/23  Future Office visit:       Routing refill request to provider for review/approval because:

## 2023-12-17 DIAGNOSIS — N95.1 MENOPAUSAL SYNDROME (HOT FLASHES): ICD-10-CM

## 2023-12-19 RX ORDER — VENLAFAXINE 50 MG/1
50 TABLET ORAL AT BEDTIME
Qty: 90 TABLET | Refills: 0 | Status: SHIPPED | OUTPATIENT
Start: 2023-12-19 | End: 2024-03-25

## 2023-12-26 NOTE — PROGRESS NOTES
"  Assessment & Plan     1. Prediabetes  Decrease metformin XR to 500mg twice daily  - Hemoglobin A1c; Future  - Lipid Profile; Future  - Basic metabolic panel; Future  - TSH with free T4 reflex; Future  - metFORMIN (GLUCOPHAGE XR) 500 MG 24 hr tablet; Take 1 tablet (500 mg) by mouth 2 times daily (with meals)  Dispense: 180 tablet; Refill: 1    2. Menopausal syndrome (hot flashes)  Continue effexor    3. Gastroesophageal reflux disease without esophagitis  - omeprazole (PRILOSEC) 40 MG DR capsule; Take 1 capsule (40 mg) by mouth daily  Dispense: 90 capsule; Refill: 1    4. Encounter for screening mammogram for breast cancer  - MA Screen Bilateral w/Alverto; Future       BMI:   Estimated body mass index is 26.94 kg/m  as calculated from the following:    Height as of this encounter: 1.6 m (5' 3\").    Weight as of this encounter: 69 kg (152 lb 1.6 oz).   Weight management plan: Discussed healthy diet and exercise guidelines        Return in about 6 months (around 6/29/2024) for pre-diabetes.    Kaila Henry, NP  [facility] - [place] Ni is a 53 year old, presenting for the following health issues:  Diabetes (PRE)        12/29/2023     8:56 AM   Additional Questions   Roomed by cathryn   Accompanied by none       HPI     Diabetes Follow-up (PRE)     How often are you checking your blood sugar? Not at all  What concerns do you have today about your diabetes? None   Do you have any of these symptoms? (Select all that apply)  No numbness or tingling in feet.  No redness, sores or blisters on feet.  No complaints of excessive thirst.  No reports of blurry vision.  No significant changes to weight.    Has been taking metformin 1000mg XR twice daily, continues to experience diarrhea.  Options reviewed such as changing to something else or trying a lower dose of metformin.  She would like to try a lower dose for now.      Other chronic conditions are stable, medications working well.  " "    BP Readings from Last 2 Encounters:   12/29/23 120/70   07/21/23 107/93     Hemoglobin A1C (%)   Date Value   06/29/2023 5.7 (H)   03/14/2023 6.0 (H)     LDL Cholesterol Calculated (mg/dL)   Date Value   06/29/2023 144 (H)   03/14/2023 151 (H)   10/01/2020 152 (H)   10/25/2017 108 (H)               BP Readings from Last 3 Encounters:   12/29/23 120/70   07/21/23 107/93   06/29/23 110/60    Wt Readings from Last 3 Encounters:   12/29/23 69 kg (152 lb 1.6 oz)   07/21/23 67.1 kg (148 lb)   06/29/23 70.1 kg (154 lb 8 oz)                        Review of Systems   Constitutional, HEENT, cardiovascular, pulmonary, gi and gu systems are negative, except as otherwise noted.      Objective    /70 (BP Location: Left arm, Patient Position: Chair, Cuff Size: Adult Regular)   Pulse 65   Temp 98  F (36.7  C) (Tympanic)   Resp 16   Ht 1.6 m (5' 3\")   Wt 69 kg (152 lb 1.6 oz)   LMP 04/12/2017 (Approximate)   SpO2 98%   BMI 26.94 kg/m    Body mass index is 26.94 kg/m .  Physical Exam   GENERAL: healthy, alert and no distress  NECK: no adenopathy, no asymmetry, masses, or scars, thyroid normal to palpation, and no carotid bruits  RESP: lungs clear to auscultation - no rales, rhonchi or wheezes  CV: regular rate and rhythm, normal S1 S2, no S3 or S4, no murmur  MS: no gross musculoskeletal defects noted, no edema  PSYCH: mentation appears normal, affect normal/bright                              "

## 2023-12-29 ENCOUNTER — OFFICE VISIT (OUTPATIENT)
Dept: FAMILY MEDICINE | Facility: OTHER | Age: 53
End: 2023-12-29
Attending: NURSE PRACTITIONER
Payer: COMMERCIAL

## 2023-12-29 VITALS
HEIGHT: 63 IN | SYSTOLIC BLOOD PRESSURE: 120 MMHG | RESPIRATION RATE: 16 BRPM | TEMPERATURE: 98 F | OXYGEN SATURATION: 98 % | HEART RATE: 65 BPM | BODY MASS INDEX: 26.95 KG/M2 | DIASTOLIC BLOOD PRESSURE: 70 MMHG | WEIGHT: 152.1 LBS

## 2023-12-29 DIAGNOSIS — K21.9 GASTROESOPHAGEAL REFLUX DISEASE WITHOUT ESOPHAGITIS: ICD-10-CM

## 2023-12-29 DIAGNOSIS — N95.1 MENOPAUSAL SYNDROME (HOT FLASHES): ICD-10-CM

## 2023-12-29 DIAGNOSIS — Z12.31 ENCOUNTER FOR SCREENING MAMMOGRAM FOR BREAST CANCER: ICD-10-CM

## 2023-12-29 DIAGNOSIS — R73.03 PREDIABETES: Primary | ICD-10-CM

## 2023-12-29 LAB
ANION GAP SERPL CALCULATED.3IONS-SCNC: 13 MMOL/L (ref 7–15)
BUN SERPL-MCNC: 12 MG/DL (ref 6–20)
CALCIUM SERPL-MCNC: 9.8 MG/DL (ref 8.6–10)
CHLORIDE SERPL-SCNC: 102 MMOL/L (ref 98–107)
CHOLEST SERPL-MCNC: 246 MG/DL
CREAT SERPL-MCNC: 0.68 MG/DL (ref 0.51–0.95)
DEPRECATED HCO3 PLAS-SCNC: 26 MMOL/L (ref 22–29)
EGFRCR SERPLBLD CKD-EPI 2021: >90 ML/MIN/1.73M2
EST. AVERAGE GLUCOSE BLD GHB EST-MCNC: 117 MG/DL
FASTING STATUS PATIENT QL REPORTED: NO
GLUCOSE SERPL-MCNC: 106 MG/DL (ref 70–99)
HBA1C MFR BLD: 5.7 %
HDLC SERPL-MCNC: 57 MG/DL
LDLC SERPL CALC-MCNC: 160 MG/DL
NONHDLC SERPL-MCNC: 189 MG/DL
POTASSIUM SERPL-SCNC: 4.5 MMOL/L (ref 3.4–5.3)
SODIUM SERPL-SCNC: 141 MMOL/L (ref 135–145)
TRIGL SERPL-MCNC: 146 MG/DL
TSH SERPL DL<=0.005 MIU/L-ACNC: 1.31 UIU/ML (ref 0.3–4.2)

## 2023-12-29 PROCEDURE — 99214 OFFICE O/P EST MOD 30 MIN: CPT | Performed by: NURSE PRACTITIONER

## 2023-12-29 PROCEDURE — 84443 ASSAY THYROID STIM HORMONE: CPT | Performed by: NURSE PRACTITIONER

## 2023-12-29 PROCEDURE — 80061 LIPID PANEL: CPT | Performed by: NURSE PRACTITIONER

## 2023-12-29 PROCEDURE — 83036 HEMOGLOBIN GLYCOSYLATED A1C: CPT | Performed by: NURSE PRACTITIONER

## 2023-12-29 PROCEDURE — 36415 COLL VENOUS BLD VENIPUNCTURE: CPT | Performed by: NURSE PRACTITIONER

## 2023-12-29 PROCEDURE — 80048 BASIC METABOLIC PNL TOTAL CA: CPT | Performed by: NURSE PRACTITIONER

## 2023-12-29 RX ORDER — METFORMIN HCL 500 MG
500 TABLET, EXTENDED RELEASE 24 HR ORAL 2 TIMES DAILY WITH MEALS
Qty: 180 TABLET | Refills: 1 | Status: SHIPPED | OUTPATIENT
Start: 2023-12-29 | End: 2024-07-01 | Stop reason: SINTOL

## 2023-12-29 RX ORDER — OMEPRAZOLE 40 MG/1
40 CAPSULE, DELAYED RELEASE ORAL DAILY
Qty: 90 CAPSULE | Refills: 1 | Status: SHIPPED | OUTPATIENT
Start: 2023-12-29 | End: 2024-07-01

## 2023-12-29 ASSESSMENT — PAIN SCALES - GENERAL: PAINLEVEL: NO PAIN (0)

## 2023-12-29 NOTE — PATIENT INSTRUCTIONS
"  Assessment & Plan     1. Prediabetes  Decrease metformin XR to 500mg twice daily  - Hemoglobin A1c; Future  - Lipid Profile; Future  - Basic metabolic panel; Future  - TSH with free T4 reflex; Future  - metFORMIN (GLUCOPHAGE XR) 500 MG 24 hr tablet; Take 1 tablet (500 mg) by mouth 2 times daily (with meals)  Dispense: 180 tablet; Refill: 1    2. Menopausal syndrome (hot flashes)  Continue effexor    3. Gastroesophageal reflux disease without esophagitis  - omeprazole (PRILOSEC) 40 MG DR capsule; Take 1 capsule (40 mg) by mouth daily  Dispense: 90 capsule; Refill: 1    4. Encounter for screening mammogram for breast cancer  - MA Screen Bilateral w/Alverto; Future       BMI:   Estimated body mass index is 26.94 kg/m  as calculated from the following:    Height as of this encounter: 1.6 m (5' 3\").    Weight as of this encounter: 69 kg (152 lb 1.6 oz).   Weight management plan: Discussed healthy diet and exercise guidelines        Return in about 6 months (around 6/29/2024) for pre-diabetes.    Kaila Henry NP  [facility] - [facility]  "

## 2024-01-22 NOTE — TELEPHONE ENCOUNTER
10:29 AM    Reason for Call: Phone Call    Description: Raine believes she needs an MRI for her back issue. Please call Raine    Was an appointment offered for this call? No    Preferred method for responding to this message: [phone]    If we cannot reach you directly, may we leave a detailed response at the number you provided?  Yes    Can this message wait until your PCP/provider returns, if available today     YES    Tracey Orozco       no

## 2024-01-28 ENCOUNTER — HEALTH MAINTENANCE LETTER (OUTPATIENT)
Age: 54
End: 2024-01-28

## 2024-03-19 ENCOUNTER — TELEPHONE (OUTPATIENT)
Dept: MAMMOGRAPHY | Facility: OTHER | Age: 54
End: 2024-03-19

## 2024-03-19 ENCOUNTER — ANCILLARY PROCEDURE (OUTPATIENT)
Dept: MAMMOGRAPHY | Facility: OTHER | Age: 54
End: 2024-03-19
Attending: NURSE PRACTITIONER
Payer: COMMERCIAL

## 2024-03-19 DIAGNOSIS — Z12.31 ENCOUNTER FOR SCREENING MAMMOGRAM FOR BREAST CANCER: ICD-10-CM

## 2024-03-19 PROCEDURE — 77063 BREAST TOMOSYNTHESIS BI: CPT | Mod: TC | Performed by: RADIOLOGY

## 2024-03-19 PROCEDURE — 77067 SCR MAMMO BI INCL CAD: CPT | Mod: TC | Performed by: RADIOLOGY

## 2024-03-24 DIAGNOSIS — N95.1 MENOPAUSAL SYNDROME (HOT FLASHES): ICD-10-CM

## 2024-03-24 DIAGNOSIS — J31.0 CHRONIC RHINITIS: ICD-10-CM

## 2024-03-25 RX ORDER — VENLAFAXINE 50 MG/1
50 TABLET ORAL AT BEDTIME
Qty: 90 TABLET | Refills: 0 | Status: SHIPPED | OUTPATIENT
Start: 2024-03-25 | End: 2024-06-26

## 2024-03-25 RX ORDER — CETIRIZINE HYDROCHLORIDE 10 MG/1
10 TABLET ORAL DAILY
Qty: 90 TABLET | Refills: 3 | Status: SHIPPED | OUTPATIENT
Start: 2024-03-25 | End: 2024-10-02

## 2024-06-25 DIAGNOSIS — N95.1 MENOPAUSAL SYNDROME (HOT FLASHES): ICD-10-CM

## 2024-06-25 NOTE — TELEPHONE ENCOUNTER
VENLAFAXINE 50MG TABLET       Last Written Prescription Date:  3/25/24  Last Fill Quantity: 90,   # refills: 0  Last Office Visit: 12/29/23  Future Office visit:    Next 5 appointments (look out 90 days)      Jul 01, 2024 10:00 AM  (Arrive by 9:45 AM)  SHORT with Kaila Henry NP  [facility] ([facility] ) [address]  [facility] [address]  [phone]             Routing refill request to provider for review/approval because:    Serotonin-Norepinephrine Reuptake Inhibitors  [id number]/25/2024 09:21 AM   Protocol Details Medication indicated for associated diagnosis

## 2024-06-26 RX ORDER — VENLAFAXINE 50 MG/1
50 TABLET ORAL AT BEDTIME
Qty: 90 TABLET | Refills: 0 | Status: SHIPPED | OUTPATIENT
Start: 2024-06-26 | End: 2024-09-24

## 2024-07-01 ENCOUNTER — OFFICE VISIT (OUTPATIENT)
Dept: FAMILY MEDICINE | Facility: OTHER | Age: 54
End: 2024-07-01
Attending: NURSE PRACTITIONER
Payer: COMMERCIAL

## 2024-07-01 VITALS
WEIGHT: 161.1 LBS | SYSTOLIC BLOOD PRESSURE: 118 MMHG | DIASTOLIC BLOOD PRESSURE: 68 MMHG | BODY MASS INDEX: 28.54 KG/M2 | OXYGEN SATURATION: 98 % | RESPIRATION RATE: 16 BRPM | HEIGHT: 63 IN | HEART RATE: 64 BPM | TEMPERATURE: 97.9 F

## 2024-07-01 DIAGNOSIS — K21.9 GASTROESOPHAGEAL REFLUX DISEASE WITHOUT ESOPHAGITIS: ICD-10-CM

## 2024-07-01 DIAGNOSIS — N95.1 MENOPAUSAL SYNDROME (HOT FLASHES): ICD-10-CM

## 2024-07-01 DIAGNOSIS — R73.03 PREDIABETES: Primary | ICD-10-CM

## 2024-07-01 LAB
ALBUMIN SERPL BCG-MCNC: 4.3 G/DL (ref 3.5–5.2)
ALP SERPL-CCNC: 56 U/L (ref 40–150)
ALT SERPL W P-5'-P-CCNC: 28 U/L (ref 0–50)
ANION GAP SERPL CALCULATED.3IONS-SCNC: 13 MMOL/L (ref 7–15)
AST SERPL W P-5'-P-CCNC: 22 U/L (ref 0–45)
BILIRUB SERPL-MCNC: 0.4 MG/DL
BUN SERPL-MCNC: 9.7 MG/DL (ref 6–20)
CALCIUM SERPL-MCNC: 9.3 MG/DL (ref 8.6–10)
CHLORIDE SERPL-SCNC: 102 MMOL/L (ref 98–107)
CHOLEST SERPL-MCNC: 271 MG/DL
CREAT SERPL-MCNC: 0.72 MG/DL (ref 0.51–0.95)
DEPRECATED HCO3 PLAS-SCNC: 23 MMOL/L (ref 22–29)
EGFRCR SERPLBLD CKD-EPI 2021: >90 ML/MIN/1.73M2
EST. AVERAGE GLUCOSE BLD GHB EST-MCNC: 111 MG/DL
FASTING STATUS PATIENT QL REPORTED: NO
FASTING STATUS PATIENT QL REPORTED: NO
GLUCOSE SERPL-MCNC: 118 MG/DL (ref 70–99)
HBA1C MFR BLD: 5.5 %
HDLC SERPL-MCNC: 63 MG/DL
LDLC SERPL CALC-MCNC: 162 MG/DL
NONHDLC SERPL-MCNC: 208 MG/DL
POTASSIUM SERPL-SCNC: 4.4 MMOL/L (ref 3.4–5.3)
PROT SERPL-MCNC: 7.1 G/DL (ref 6.4–8.3)
SODIUM SERPL-SCNC: 138 MMOL/L (ref 135–145)
TRIGL SERPL-MCNC: 231 MG/DL
TSH SERPL DL<=0.005 MIU/L-ACNC: 1.06 UIU/ML (ref 0.3–4.2)

## 2024-07-01 PROCEDURE — 83036 HEMOGLOBIN GLYCOSYLATED A1C: CPT | Performed by: NURSE PRACTITIONER

## 2024-07-01 PROCEDURE — 99214 OFFICE O/P EST MOD 30 MIN: CPT | Performed by: NURSE PRACTITIONER

## 2024-07-01 PROCEDURE — G2211 COMPLEX E/M VISIT ADD ON: HCPCS | Performed by: NURSE PRACTITIONER

## 2024-07-01 PROCEDURE — 84443 ASSAY THYROID STIM HORMONE: CPT | Performed by: NURSE PRACTITIONER

## 2024-07-01 PROCEDURE — 80061 LIPID PANEL: CPT | Performed by: NURSE PRACTITIONER

## 2024-07-01 PROCEDURE — 36415 COLL VENOUS BLD VENIPUNCTURE: CPT | Performed by: NURSE PRACTITIONER

## 2024-07-01 PROCEDURE — 80053 COMPREHEN METABOLIC PANEL: CPT | Performed by: NURSE PRACTITIONER

## 2024-07-01 RX ORDER — OMEPRAZOLE 40 MG/1
40 CAPSULE, DELAYED RELEASE ORAL DAILY
Qty: 90 CAPSULE | Refills: 1 | Status: SHIPPED | OUTPATIENT
Start: 2024-07-01

## 2024-07-01 ASSESSMENT — PAIN SCALES - GENERAL: PAINLEVEL: NO PAIN (0)

## 2024-07-01 NOTE — PROGRESS NOTES
"  Assessment & Plan     1. Prediabetes  Stop metformin for now due to GI side effects.    - Hemoglobin A1c; Future  - Lipid Profile; Future  - Comprehensive metabolic panel; Future  - TSH with free T4 reflex; Future    2. Gastroesophageal reflux disease without esophagitis  - omeprazole (PRILOSEC) 40 MG DR capsule; Take 1 capsule (40 mg) by mouth daily  Dispense: 90 capsule; Refill: 1    3. Menopausal syndrome (hot flashes)  Continue effexor.         BMI  Estimated body mass index is 28.54 kg/m  as calculated from the following:    Height as of this encounter: 1.6 m (5' 3\").    Weight as of this encounter: 73.1 kg (161 lb 1.6 oz).   Weight management plan: Discussed healthy diet and exercise guidelines      Return in about 3 months (around 10/1/2024).    The longitudinal plan of care for the diagnosis(es)/condition(s) as documented were addressed during this visit. Due to the added complexity in care, I will continue to support Raine in the subsequent management and with ongoing continuity of care.    Kaila Henry,   Certified Adult Nurse Practitioner  [phone]      Jose Ni is a 54 year old, presenting for the following health issues:  Diabetes (pre)        7/1/2024     9:50 AM   Additional Questions   Roomed by cathryn   Accompanied by none     HPI     Diabetes Follow-up(pre)    How often are you checking your blood sugar? Not at all  What concerns do you have today about your diabetes? None   Do you have any of these symptoms? (Select all that apply)  No numbness or tingling in feet.  No redness, sores or blisters on feet.  No complaints of excessive thirst.  No reports of blurry vision.  No significant changes to weight.      BP Readings from Last 2 Encounters:   07/01/24 118/68   12/29/23 120/70     Hemoglobin A1C (%)   Date Value   12/29/2023 5.7 (H)   06/29/2023 5.7 (H)     LDL Cholesterol Calculated (mg/dL)   Date Value   12/29/2023 160 (H)   06/29/2023 144 (H)   10/01/2020 152 (H) " "  10/25/2017 108 (H)           BP Readings from Last 3 Encounters:   07/01/24 118/68   12/29/23 120/70   07/21/23 107/93    Wt Readings from Last 3 Encounters:   07/01/24 73.1 kg (161 lb 1.6 oz)   12/29/23 69 kg (152 lb 1.6 oz)   07/21/23 67.1 kg (148 lb)                  Review of Systems  Constitutional, neuro, ENT, endocrine, pulmonary, cardiac, gastrointestinal, genitourinary, musculoskeletal, integument and psychiatric systems are negative, except as otherwise noted.      Objective    /68 (BP Location: Left arm, Patient Position: Chair, Cuff Size: Adult Regular)   Pulse 64   Temp 97.9  F (36.6  C) (Tympanic)   Resp 16   Ht 1.6 m (5' 3\")   Wt 73.1 kg (161 lb 1.6 oz)   LMP 04/12/2017 (Approximate)   SpO2 98%   BMI 28.54 kg/m    Body mass index is 28.54 kg/m .  Physical Exam   GENERAL: alert and no distress  NECK: no adenopathy, no asymmetry, masses, or scars, thyroid normal to palpation, and no carotid bruits  RESP: lungs clear to auscultation - no rales, rhonchi or wheezes  CV: regular rate and rhythm, normal S1 S2, no S3 or S4, no murmur  MS: no gross musculoskeletal defects noted, no edema  PSYCH: mentation appears normal, affect normal/bright            Signed Electronically by: Kaila Henry NP    "

## 2024-07-01 NOTE — PATIENT INSTRUCTIONS
"  Assessment & Plan     1. Prediabetes  Stop metformin for now   - Hemoglobin A1c; Future  - Lipid Profile; Future  - Comprehensive metabolic panel; Future  - TSH with free T4 reflex; Future    2. Gastroesophageal reflux disease without esophagitis  - omeprazole (PRILOSEC) 40 MG DR capsule; Take 1 capsule (40 mg) by mouth daily  Dispense: 90 capsule; Refill: 1    3. Menopausal syndrome (hot flashes)  Continue effexor.         BMI  Estimated body mass index is 28.54 kg/m  as calculated from the following:    Height as of this encounter: 1.6 m (5' 3\").    Weight as of this encounter: 73.1 kg (161 lb 1.6 oz).   Weight management plan: Discussed healthy diet and exercise guidelines      Return in about 3 months (around 10/1/2024).    Kaila Henry,   Certified Adult Nurse Practitioner  [phone]      "

## 2024-09-24 DIAGNOSIS — N95.1 MENOPAUSAL SYNDROME (HOT FLASHES): ICD-10-CM

## 2024-09-24 RX ORDER — VENLAFAXINE 50 MG/1
50 TABLET ORAL AT BEDTIME
Qty: 90 TABLET | Refills: 2 | Status: SHIPPED | OUTPATIENT
Start: 2024-09-24

## 2024-09-26 NOTE — PROGRESS NOTES
Assessment & Plan     1. Prediabetes  - Hemoglobin A1c; Future  - Lipid Profile; Future  - Comprehensive metabolic panel; Future  - TSH with free T4 reflex; Future    2. Menopausal syndrome (hot flashes)  - continue effexor    3. Gastroesophageal reflux disease without esophagitis  - continue omeprazole    4. Personal history of tobacco use  - Prof fee: Shared Decision Making for Lung Cancer Screening  - CT Chest Lung Cancer Scrn Low Dose wo; Future    5. Chronic rhinitis  Continue zyrtec.   - cetirizine (ZYRTEC) 10 MG tablet; Take 1 tablet (10 mg) by mouth daily.  Dispense: 90 tablet; Refill: 1    6. Need for vaccination  - ZOSTER RECOMBINANT ADJUVANTED (SHINGRIX)        Return in about 3 months (around 1/2/2025) for pre-diabetes, GERD.    The longitudinal plan of care for the diagnosis(es)/condition(s) as documented were addressed during this visit. Due to the added complexity in care, I will continue to support Raine in the subsequent management and with ongoing continuity of care.    Kaila Henry,   Certified Adult Nurse Practitioner  [phone]      Jose Ni is a 54 year old, presenting for the following health issues:  Diabetes        10/2/2024     8:48 AM   Additional Questions   Roomed by ctahryn   Accompanied by none     HPI     Pre-Diabetes Follow-up    How often are you checking your blood sugar? Not at all  What concerns do you have today about your diabetes? None   Do you have any of these symptoms? (Select all that apply)  No numbness or tingling in feet.  No redness, sores or blisters on feet.  No complaints of excessive thirst.  No reports of blurry vision.  No significant changes to weight.  Stopped metformin, due to recheck A1c and see how glucose levels are.      Continues working on weight loss.    Due for low dose chest ct, mammogram current.    GERD stable.      BP Readings from Last 3 Encounters:   10/02/24 112/74   07/01/24 118/68   12/29/23 120/70    Wt Readings from Last  "3 Encounters:   10/02/24 74.2 kg (163 lb 8 oz)   07/01/24 73.1 kg (161 lb 1.6 oz)   12/29/23 69 kg (152 lb 1.6 oz)            Hemoglobin A1C (%)   Date Value   07/01/2024 5.5   12/29/2023 5.7 (H)     LDL Cholesterol Calculated (mg/dL)   Date Value   07/01/2024 162 (H)   12/29/2023 160 (H)   10/01/2020 152 (H)   10/25/2017 108 (H)         Review of Systems  Constitutional, neuro, ENT, endocrine, pulmonary, cardiac, gastrointestinal, genitourinary, musculoskeletal, integument and psychiatric systems are negative, except as otherwise noted.      Objective    /74 (BP Location: Left arm, Patient Position: Chair, Cuff Size: Adult Regular)   Pulse 57   Temp 97.5  F (36.4  C) (Tympanic)   Resp 16   Ht 1.6 m (5' 3\")   Wt 74.2 kg (163 lb 8 oz)   LMP 04/12/2017 (Approximate)   SpO2 99%   BMI 28.96 kg/m    Body mass index is 28.96 kg/m .  Physical Exam   GENERAL: alert and no distress  NECK: no adenopathy, no asymmetry, masses, or scars, thyroid normal to palpation, and no carotid bruits  RESP: lungs clear to auscultation - no rales, rhonchi or wheezes  CV: regular rate and rhythm, normal S1 S2, no S3 or S4, no murmur  MS: no gross musculoskeletal defects noted, no edema  PSYCH: mentation appears normal, affect normal/bright            Signed Electronically by: Kaila Henry NP  Lung Cancer Screening Shared Decision Making Visit     Raine Ayala, a 54 year old female, is eligible for lung cancer screening    History   Smoking Status    Former    Types: Cigarettes   Smokeless Tobacco    Never       I have discussed with patient the risks and benefits of screening for lung cancer with low-dose CT.     The risks include:    radiation exposure: one low dose chest CT has as much ionizing radiation as about 15 chest x-rays, or 6 months of background radiation living in [place]      false positives: most findings/nodules are NOT cancer, but some might still require additional diagnostic evaluation, " including biopsy    over-diagnosis: some slow growing cancers that might never have been clinically significant will be detected and treated unnecessarily     The benefit of early detection of lung cancer is contingent upon adherence to annual screening or more frequent follow up if indicated.     Furthermore, to benefit from screening, Raine must be willing and able to undergo diagnostic procedures, if indicated. Although no specific guide is available for determining severity of comorbidities, it is reasonable to withhold screening in patients who have greater mortality risk from other diseases.     We did discuss that the best way to prevent lung cancer is to not smoke.    Some patients may value a numeric estimation of lung cancer risk when evaluating if lung cancer screening is right for them, here is one calculator:    ShouldIScreen

## 2024-10-02 ENCOUNTER — OFFICE VISIT (OUTPATIENT)
Dept: FAMILY MEDICINE | Facility: OTHER | Age: 54
End: 2024-10-02
Attending: NURSE PRACTITIONER
Payer: COMMERCIAL

## 2024-10-02 VITALS
RESPIRATION RATE: 16 BRPM | HEIGHT: 63 IN | OXYGEN SATURATION: 99 % | HEART RATE: 57 BPM | DIASTOLIC BLOOD PRESSURE: 74 MMHG | BODY MASS INDEX: 28.97 KG/M2 | TEMPERATURE: 97.5 F | WEIGHT: 163.5 LBS | SYSTOLIC BLOOD PRESSURE: 112 MMHG

## 2024-10-02 DIAGNOSIS — N95.1 MENOPAUSAL SYNDROME (HOT FLASHES): ICD-10-CM

## 2024-10-02 DIAGNOSIS — Z87.891 PERSONAL HISTORY OF TOBACCO USE: ICD-10-CM

## 2024-10-02 DIAGNOSIS — Z23 NEED FOR VACCINATION: ICD-10-CM

## 2024-10-02 DIAGNOSIS — J31.0 CHRONIC RHINITIS: ICD-10-CM

## 2024-10-02 DIAGNOSIS — R73.03 PREDIABETES: Primary | ICD-10-CM

## 2024-10-02 DIAGNOSIS — K21.9 GASTROESOPHAGEAL REFLUX DISEASE WITHOUT ESOPHAGITIS: ICD-10-CM

## 2024-10-02 LAB
ALBUMIN SERPL BCG-MCNC: 4.3 G/DL (ref 3.5–5.2)
ALP SERPL-CCNC: 64 U/L (ref 40–150)
ALT SERPL W P-5'-P-CCNC: 30 U/L (ref 0–50)
ANION GAP SERPL CALCULATED.3IONS-SCNC: 13 MMOL/L (ref 7–15)
AST SERPL W P-5'-P-CCNC: 23 U/L (ref 0–45)
BILIRUB SERPL-MCNC: 0.4 MG/DL
BUN SERPL-MCNC: 10.8 MG/DL (ref 6–20)
CALCIUM SERPL-MCNC: 9.5 MG/DL (ref 8.8–10.4)
CHLORIDE SERPL-SCNC: 104 MMOL/L (ref 98–107)
CHOLEST SERPL-MCNC: 263 MG/DL
CREAT SERPL-MCNC: 0.78 MG/DL (ref 0.51–0.95)
EGFRCR SERPLBLD CKD-EPI 2021: >90 ML/MIN/1.73M2
EST. AVERAGE GLUCOSE BLD GHB EST-MCNC: 117 MG/DL
FASTING STATUS PATIENT QL REPORTED: NO
FASTING STATUS PATIENT QL REPORTED: NO
GLUCOSE SERPL-MCNC: 122 MG/DL (ref 70–99)
HBA1C MFR BLD: 5.7 %
HCO3 SERPL-SCNC: 24 MMOL/L (ref 22–29)
HDLC SERPL-MCNC: 52 MG/DL
LDLC SERPL CALC-MCNC: 166 MG/DL
NONHDLC SERPL-MCNC: 211 MG/DL
POTASSIUM SERPL-SCNC: 4.8 MMOL/L (ref 3.4–5.3)
PROT SERPL-MCNC: 6.8 G/DL (ref 6.4–8.3)
SODIUM SERPL-SCNC: 141 MMOL/L (ref 135–145)
TRIGL SERPL-MCNC: 225 MG/DL
TSH SERPL DL<=0.005 MIU/L-ACNC: 1.64 UIU/ML (ref 0.3–4.2)

## 2024-10-02 PROCEDURE — 80061 LIPID PANEL: CPT | Performed by: NURSE PRACTITIONER

## 2024-10-02 PROCEDURE — 90750 HZV VACC RECOMBINANT IM: CPT | Performed by: NURSE PRACTITIONER

## 2024-10-02 PROCEDURE — 84443 ASSAY THYROID STIM HORMONE: CPT | Performed by: NURSE PRACTITIONER

## 2024-10-02 PROCEDURE — G0296 VISIT TO DETERM LDCT ELIG: HCPCS | Performed by: NURSE PRACTITIONER

## 2024-10-02 PROCEDURE — 99214 OFFICE O/P EST MOD 30 MIN: CPT | Mod: 25 | Performed by: NURSE PRACTITIONER

## 2024-10-02 PROCEDURE — 80053 COMPREHEN METABOLIC PANEL: CPT | Performed by: NURSE PRACTITIONER

## 2024-10-02 PROCEDURE — 90471 IMMUNIZATION ADMIN: CPT | Performed by: NURSE PRACTITIONER

## 2024-10-02 PROCEDURE — 36415 COLL VENOUS BLD VENIPUNCTURE: CPT | Performed by: NURSE PRACTITIONER

## 2024-10-02 PROCEDURE — 83036 HEMOGLOBIN GLYCOSYLATED A1C: CPT | Performed by: NURSE PRACTITIONER

## 2024-10-02 RX ORDER — CETIRIZINE HYDROCHLORIDE 10 MG/1
10 TABLET ORAL DAILY
Qty: 90 TABLET | Refills: 1 | Status: SHIPPED | OUTPATIENT
Start: 2024-10-02

## 2024-10-02 ASSESSMENT — PAIN SCALES - GENERAL: PAINLEVEL: NO PAIN (0)

## 2024-10-02 NOTE — PATIENT INSTRUCTIONS
Assessment & Plan     1. Prediabetes  - Hemoglobin A1c; Future  - Lipid Profile; Future  - Comprehensive metabolic panel; Future  - TSH with free T4 reflex; Future    2. Menopausal syndrome (hot flashes)  - continue effexor    3. Gastroesophageal reflux disease without esophagitis  - continue omeprazole    4. Personal history of tobacco use  - Prof fee: Shared Decision Making for Lung Cancer Screening  - CT Chest Lung Cancer Scrn Low Dose wo; Future    5. Chronic rhinitis  Continue zyrtec.   - cetirizine (ZYRTEC) 10 MG tablet; Take 1 tablet (10 mg) by mouth daily.  Dispense: 90 tablet; Refill: 1        Return in about 3 months (around 1/2/2025) for pre-diabetes, GERD.    Kaila Henry,   Certified Adult Nurse Practitioner  [phone]        Lung Cancer Screening   Frequently Asked Questions  If you are at high-risk for lung cancer, getting screened with low-dose computed tomography (LDCT) every year can help save your life. This handout offers answers to some of the most common questions about lung cancer screening. If you have other questions, please call [phone] ([phone]).     What is it?  Lung cancer screening uses special X-ray technology to create an image of your lung tissue. The exam is quick and easy and takes less than 10 seconds. We don t give you any medicine or use any needles. You can eat before and after the exam. You don t need to change your clothes as long as the clothing on your chest doesn t contain metal. But, you do need to be able to hold your breath for at least 6 seconds during the exam.    What is the goal of lung cancer screening?  The goal of lung cancer screening is to save lives. Many times, lung cancer is not found until a person starts having physical symptoms. Lung cancer screening can help detect lung cancer in the earliest stages when it may be easier to treat.    Who should be screened for lung cancer?  We suggest lung cancer screening for anyone who is  at high-risk for lung cancer. You are in the high-risk group if you:     are between the ages of 55 and 79, and   have smoked at least 1 pack of cigarettes a day for 20 or more years, and   still smoke or have quit within the past 15 years.    However, if you have a new cough or shortness of breath, you should talk to your doctor before being screened.    Why does it matter if I have symptoms?  Certain symptoms can be a sign that you have a condition in your lungs that should be checked and treated by your doctor. These symptoms include fever, chest pain, a new or changing cough, shortness of breath that you have never felt before, coughing up blood or unexplained weight loss. Having any of these symptoms can greatly affect the results of lung cancer screening.       Should all smokers get an LDCT lung cancer screening exam?  It depends. Lung cancer screening is for a very specific group of men and women who have a history of heavy smoking over a long period of time (see  Who should be screened for lung cancer  above).  I am in the high-risk group, but have been diagnosed with cancer in the past. Is LDCT lung cancer screening right for me?  In some cases, you should not have LDCT lung screening, such as when your doctor is already following your cancer with CT scan studies. Your doctor will help you decide if LDCT lung screening is right for you.  Do I need to have a screening exam every year?  Yes. If you are in the high-risk group described earlier, you should get an LDCT lung cancer screening exam every year until you are 79, or are no longer willing or able to undergo screening and possible procedures to diagnose and treat lung cancer.  How effective is LDCT at preventing death from lung cancer?  Studies have shown that LDCT lung cancer screening can lower the risk of death from lung cancer by 20 percent in people who are at high-risk.  What are the risks?  There are some risks and limitations of LDCT lung  cancer screening. We want to make sure you understand the risks and benefits, so please let us know if you have any questions. Your doctor may want to talk with you more about these risks.   Radiation exposure: As with any exam that uses radiation, there is a very small increased risk of cancer. The amount of radiation in LDCT is small--about the same amount a person would get from a mammogram. Your doctor orders the exam when he or she feels the potential benefits outweigh the risks.   False negatives: No test is perfect, including LDCT. It is possible that you may have a medical condition, including lung cancer, that is not found during your exam. This is called a false negative result.   False positives and more testing: LDCT very often finds something in the lung that could be cancer, but in fact is not. This is called a false positive result. False positive tests often cause anxiety. To make sure these findings are not cancer, you may need to have more tests. These tests will be done only if you give us permission. Sometimes patients need a treatment that can have side effects, such as a biopsy. For more information on false positives, see  What can I expect from the results?    Findings not related to lung cancer: Your LDCT exam also takes pictures of areas of your body next to your lungs. In a very small number of cases, the CT scan will show an abnormal finding in one of these areas, such as your kidneys, adrenal glands, liver or thyroid. This finding may not be serious, but you may need more tests. Your doctor can help you decide what other tests you may need, if any.  What can I expect from the results?  About 1 out of 4 LDCT exams will find something that may need more tests. Most of the time, these findings are lung nodules. Lung nodules are very small collections of tissue in the lung. These nodules are very common, and the vast majority--more than 97 percent--are not cancer (benign). Most are normal  lymph nodes or small areas of scarring from past infections.  But, if a small lung nodule is found to be cancer, the cancer can be cured more than 90 percent of the time. To know if the nodule is cancer, we may need to get more images before your next yearly screening exam. If the nodule has suspicious features (for example, it is large, has an odd shape or grows over time), we will refer you to a specialist for further testing.  Will my doctor also get the results?  Yes. Your doctor will get a copy of your results.  Is it okay to keep smoking now that there s a cancer screening exam?  No. Tobacco is one of the strongest cancer-causing agents. It causes not only lung cancer, but other cancers and cardiovascular (heart) diseases as well. The damage caused by smoking builds over time. This means that the longer you smoke, the higher your risk of disease. While it is never too late to quit, the sooner you quit, the better.  Where can I find help to quit smoking?  The best way to prevent lung cancer is to stop smoking. If you have already quit smoking, congratulations and keep it up! For help on quitting smoking, please call [employer] at 8-462-QUITNOW ([phone]) or the American [employer] Society at 1-810.428.4128 to find local resources near you.  One-on-one health coaching:  If you d prefer to work individually with a health care provider on tobacco cessation, we offer:     Medication Therapy Management:  Our specially trained pharmacists work closely with you and your doctor to help you quit smoking.  Call [phone] or [phone] (toll free).

## 2024-10-28 ENCOUNTER — HOSPITAL ENCOUNTER (OUTPATIENT)
Dept: CT IMAGING | Facility: HOSPITAL | Age: 54
Discharge: HOME | End: 2024-10-28
Attending: NURSE PRACTITIONER | Admitting: NURSE PRACTITIONER
Payer: COMMERCIAL

## 2024-10-28 DIAGNOSIS — Z87.891 PERSONAL HISTORY OF TOBACCO USE: ICD-10-CM

## 2024-10-28 PROCEDURE — 71271 CT THORAX LUNG CANCER SCR C-: CPT

## 2024-12-30 NOTE — PROGRESS NOTES
"  Assessment & Plan     1. Prediabetes (Primary)  Continue current plan  - Hemoglobin A1c; Future  - Lipid Profile; Future  - Comprehensive metabolic panel; Future  - TSH with free T4 reflex; Future    2. Gastroesophageal reflux disease without esophagitis  Stable   - omeprazole (PRILOSEC) 40 MG DR capsule; Take 1 capsule (40 mg) by mouth daily.  Dispense: 90 capsule; Refill: 1    3. Menopausal syndrome (hot flashes)  Continue effexor.      4. Breast cancer screening by mammogram  routine  - MA Screen Bilateral w/Alverto; Future    5. Need for vaccination  Flu vaccine updated today      BMI  Estimated body mass index is 29.05 kg/m  as calculated from the following:    Height as of 10/2/24: 1.6 m (5' 3\").    Weight as of this encounter: 74.4 kg (164 lb).   Weight management plan: Discussed healthy diet and exercise guidelines    Follow-up in 6 month or as needed     The longitudinal plan of care for the diagnosis(es)/condition(s) as documented were addressed during this visit. Due to the added complexity in care, I will continue to support Raine in the subsequent management and with ongoing continuity of care.    Kaila Henry,   Certified Adult Nurse Practitioner  [phone]      Jose Ni is a 54 year old, presenting for the following health issues:  Gastrophageal Reflux and Diabetes (PRE)        1/3/2025     8:25 AM   Additional Questions   Roomed by Areli SOARES   Accompanied by None     HPI     Diabetes Follow-up (PRE)    How often are you checking your blood sugar? Not at all  What concerns do you have today about your diabetes? None   Do you have any of these symptoms? (Select all that apply)  No numbness or tingling in feet.  No redness, sores or blisters on feet.  No complaints of excessive thirst.  No reports of blurry vision.  No significant changes to weight.      BP Readings from Last 3 Encounters:   01/03/25 138/72   10/02/24 112/74   07/01/24 118/68    Wt Readings from Last 3 Encounters: "   01/03/25 74.4 kg (164 lb)   10/02/24 74.2 kg (163 lb 8 oz)   07/01/24 73.1 kg (161 lb 1.6 oz)                 Hemoglobin A1C (%)   Date Value   10/02/2024 5.7 (H)   07/01/2024 5.5     LDL Cholesterol Calculated (mg/dL)   Date Value   10/02/2024 166 (H)   07/01/2024 162 (H)   10/01/2020 152 (H)   10/25/2017 108 (H)           GERD/Heartburn  Onset/Duration: years  Description: improved  Intensity: mild  Progression of Symptoms: improving  Accompanying Signs & Symptoms:  Does it feel like food gets stuck or trouble swallowing: No  Nausea: No  Vomiting (bloody?): No  Abdominal Pain: No  Black-Tarry stools: No  Bloody stools: No  History:  Previous similar episodes: No  Previous ulcers: No  Precipitating factors:   Caffeine use: YES  Alcohol use: YES  NSAID/Aspirin use: YES  Tobacco use: No  Worse with no particular food or drink.  Alleviating factors: None  Therapies tried and outcome:             Lifestyle changes: None            Medications: Omeprazole (Prilosec)    She has been taking effexor for menopausal hot flashes and it has been working well.      Recent Labs   Lab Test 01/03/25  0923 10/02/24  0930 07/01/24  1029 11/23/21  0816 10/01/20  0854 08/08/18  1204   A1C 6.0* 5.7* 5.5   < >  --   --    * 166* 162*   < > 152*  --    HDL 50 52 63   < > 48*  --    TRIG 247* 225* 231*   < > 190*  --    ALT 33 30 28   < >  --  33   CR 0.77 0.78 0.72   < > 0.66 0.66   GFRESTIMATED >90 >90 >90   < > >90 >90   GFRESTBLACK  --   --   --   --  >90 >90   POTASSIUM 4.6 4.8 4.4   < > 4.0 4.5   TSH 1.14 1.64 1.06   < > 0.76  --     < > = values in this interval not displayed.      BP Readings from Last 3 Encounters:   01/03/25 138/72   10/02/24 112/74   07/01/24 118/68    Wt Readings from Last 3 Encounters:   01/03/25 74.4 kg (164 lb)   10/02/24 74.2 kg (163 lb 8 oz)   07/01/24 73.1 kg (161 lb 1.6 oz)                        Review of Systems  Constitutional, neuro, ENT, endocrine, pulmonary, cardiac, gastrointestinal,  genitourinary, musculoskeletal, integument and psychiatric systems are negative, except as otherwise noted.      Objective    /72 (BP Location: Left arm, Patient Position: Sitting, Cuff Size: Adult Large)   Pulse 66   Temp 97.8  F (36.6  C) (Tympanic)   Resp 16   Wt 74.4 kg (164 lb)   LMP 04/12/2017 (Approximate)   SpO2 99%   Breastfeeding No   BMI 29.05 kg/m    Body mass index is 29.05 kg/m .  Physical Exam   GENERAL: alert and no distress  NECK: no adenopathy, no asymmetry, masses, or scars, thyroid normal to palpation, and no carotid bruits  RESP: lungs clear to auscultation - no rales, rhonchi or wheezes  CV: regular rate and rhythm, normal S1 S2, no S3 or S4, no murmur  MS: no gross musculoskeletal defects noted, no edema  PSYCH: mentation appears normal, affect normal/bright            Signed Electronically by: Kaila Henry, NP

## 2025-01-01 ENCOUNTER — TRANSFERRED RECORDS (OUTPATIENT)
Dept: MULTI SPECIALTY CLINIC | Facility: CLINIC | Age: 55
End: 2025-01-01

## 2025-01-01 LAB — RETINOPATHY: NORMAL

## 2025-01-03 ENCOUNTER — OFFICE VISIT (OUTPATIENT)
Dept: FAMILY MEDICINE | Facility: OTHER | Age: 55
End: 2025-01-03
Attending: NURSE PRACTITIONER
Payer: COMMERCIAL

## 2025-01-03 VITALS
DIASTOLIC BLOOD PRESSURE: 72 MMHG | WEIGHT: 164 LBS | SYSTOLIC BLOOD PRESSURE: 138 MMHG | TEMPERATURE: 97.8 F | OXYGEN SATURATION: 99 % | RESPIRATION RATE: 16 BRPM | HEART RATE: 66 BPM | BODY MASS INDEX: 29.05 KG/M2

## 2025-01-03 DIAGNOSIS — Z12.31 BREAST CANCER SCREENING BY MAMMOGRAM: ICD-10-CM

## 2025-01-03 DIAGNOSIS — Z23 NEED FOR VACCINATION: ICD-10-CM

## 2025-01-03 DIAGNOSIS — R73.03 PREDIABETES: Primary | ICD-10-CM

## 2025-01-03 DIAGNOSIS — K21.9 GASTROESOPHAGEAL REFLUX DISEASE WITHOUT ESOPHAGITIS: ICD-10-CM

## 2025-01-03 DIAGNOSIS — N95.1 MENOPAUSAL SYNDROME (HOT FLASHES): ICD-10-CM

## 2025-01-03 LAB
ALBUMIN SERPL BCG-MCNC: 4.3 G/DL (ref 3.5–5.2)
ALP SERPL-CCNC: 58 U/L (ref 40–150)
ALT SERPL W P-5'-P-CCNC: 33 U/L (ref 0–50)
ANION GAP SERPL CALCULATED.3IONS-SCNC: 13 MMOL/L (ref 7–15)
AST SERPL W P-5'-P-CCNC: 22 U/L (ref 0–45)
BILIRUB SERPL-MCNC: 0.4 MG/DL
BUN SERPL-MCNC: 11 MG/DL (ref 6–20)
CALCIUM SERPL-MCNC: 9.6 MG/DL (ref 8.8–10.4)
CHLORIDE SERPL-SCNC: 100 MMOL/L (ref 98–107)
CHOLEST SERPL-MCNC: 296 MG/DL
CREAT SERPL-MCNC: 0.77 MG/DL (ref 0.51–0.95)
EGFRCR SERPLBLD CKD-EPI 2021: >90 ML/MIN/1.73M2
EST. AVERAGE GLUCOSE BLD GHB EST-MCNC: 126 MG/DL
FASTING STATUS PATIENT QL REPORTED: YES
FASTING STATUS PATIENT QL REPORTED: YES
GLUCOSE SERPL-MCNC: 130 MG/DL (ref 70–99)
HBA1C MFR BLD: 6 %
HCO3 SERPL-SCNC: 24 MMOL/L (ref 22–29)
HDLC SERPL-MCNC: 50 MG/DL
LDLC SERPL CALC-MCNC: 197 MG/DL
NONHDLC SERPL-MCNC: 246 MG/DL
POTASSIUM SERPL-SCNC: 4.6 MMOL/L (ref 3.4–5.3)
PROT SERPL-MCNC: 6.7 G/DL (ref 6.4–8.3)
SODIUM SERPL-SCNC: 137 MMOL/L (ref 135–145)
TRIGL SERPL-MCNC: 247 MG/DL
TSH SERPL DL<=0.005 MIU/L-ACNC: 1.14 UIU/ML (ref 0.3–4.2)

## 2025-01-03 PROCEDURE — 83036 HEMOGLOBIN GLYCOSYLATED A1C: CPT | Performed by: NURSE PRACTITIONER

## 2025-01-03 PROCEDURE — 80061 LIPID PANEL: CPT | Performed by: NURSE PRACTITIONER

## 2025-01-03 PROCEDURE — G2211 COMPLEX E/M VISIT ADD ON: HCPCS | Performed by: NURSE PRACTITIONER

## 2025-01-03 PROCEDURE — 90677 PCV20 VACCINE IM: CPT | Performed by: NURSE PRACTITIONER

## 2025-01-03 PROCEDURE — 80053 COMPREHEN METABOLIC PANEL: CPT | Performed by: NURSE PRACTITIONER

## 2025-01-03 PROCEDURE — 84443 ASSAY THYROID STIM HORMONE: CPT | Performed by: NURSE PRACTITIONER

## 2025-01-03 PROCEDURE — 36415 COLL VENOUS BLD VENIPUNCTURE: CPT | Performed by: NURSE PRACTITIONER

## 2025-01-03 PROCEDURE — 90471 IMMUNIZATION ADMIN: CPT | Performed by: NURSE PRACTITIONER

## 2025-01-03 PROCEDURE — 99214 OFFICE O/P EST MOD 30 MIN: CPT | Mod: 25 | Performed by: NURSE PRACTITIONER

## 2025-01-03 RX ORDER — OMEPRAZOLE 40 MG/1
40 CAPSULE, DELAYED RELEASE ORAL DAILY
Qty: 90 CAPSULE | Refills: 1 | Status: SHIPPED | OUTPATIENT
Start: 2025-01-03

## 2025-01-03 ASSESSMENT — PAIN SCALES - GENERAL: PAINLEVEL_OUTOF10: NO PAIN (0)

## 2025-02-01 ENCOUNTER — HEALTH MAINTENANCE LETTER (OUTPATIENT)
Age: 55
End: 2025-02-01

## 2025-03-24 DIAGNOSIS — J31.0 CHRONIC RHINITIS: ICD-10-CM

## 2025-03-24 RX ORDER — CETIRIZINE HYDROCHLORIDE 10 MG/1
10 TABLET ORAL DAILY
Qty: 90 TABLET | Refills: 2 | Status: SHIPPED | OUTPATIENT
Start: 2025-03-24

## 2025-06-12 ENCOUNTER — PATIENT OUTREACH (OUTPATIENT)
Dept: CARE COORDINATION | Facility: CLINIC | Age: 55
End: 2025-06-12
Payer: COMMERCIAL

## 2025-06-25 DIAGNOSIS — N95.1 MENOPAUSAL SYNDROME (HOT FLASHES): ICD-10-CM

## 2025-06-25 RX ORDER — VENLAFAXINE 50 MG/1
50 TABLET ORAL AT BEDTIME
Qty: 90 TABLET | Refills: 1 | Status: SHIPPED | OUTPATIENT
Start: 2025-06-25

## 2025-07-01 NOTE — PROGRESS NOTES
"  Assessment & Plan     1. Prediabetes (Primary)  - Hemoglobin A1c; Future  - Lipid Profile; Future  - Comprehensive metabolic panel; Future  - TSH with free T4 reflex; Future    2. Menopausal syndrome (hot flashes)  Continue effexor    3. Gastroesophageal reflux disease without esophagitis  Continue omeprazole.         BMI  Estimated body mass index is 28.22 kg/m  as calculated from the following:    Height as of this encounter: 1.6 m (5' 3\").    Weight as of this encounter: 72.3 kg (159 lb 4.8 oz).   Weight management plan: Discussed healthy diet and exercise guidelines      Follow-up   Return in about 6 months (around 1/7/2026) for pre-diabetes, GERD.    The longitudinal plan of care for the diagnosis(es)/condition(s) as documented were addressed during this visit. Due to the added complexity in care, I will continue to support Raine in the subsequent management and with ongoing continuity of care.    Kaila Henry,   Certified Adult Nurse Practitioner  [phone]     Jose iN is a 55 year old, presenting for the following health issues:  Diabetes (Prediabetes) and Gastrophageal Reflux        7/7/2025     8:20 AM   Additional Questions   Roomed by José Sapp LPN   Accompanied by Self         7/7/2025     8:20 AM   Patient Reported Additional Medications   Patient reports taking the following new medications None     HPI      PreDiabetes Follow-up    How often are you checking your blood sugar? Not at all  What concerns do you have today about your diabetes? None   Do you have any of these symptoms? (Select all that apply)  No numbness or tingling in feet.  No redness, sores or blisters on feet.  No complaints of excessive thirst.  No reports of blurry vision.  No significant changes to weight.  Have you had a diabetic eye exam in the last 12 months? Yes- Date of last eye exam: January regular eye exam,  Location: [facility]        BP Readings from Last 3 Encounters:   07/07/25 124/76 " "  01/03/25 138/72   10/02/24 112/74    Wt Readings from Last 3 Encounters:   07/07/25 72.3 kg (159 lb 4.8 oz)   01/03/25 74.4 kg (164 lb)   10/02/24 74.2 kg (163 lb 8 oz)                 Hemoglobin A1C (%)   Date Value   01/03/2025 6.0 (H)   10/02/2024 5.7 (H)     LDL Cholesterol Calculated (mg/dL)   Date Value   01/03/2025 197 (H)   10/02/2024 166 (H)   10/01/2020 152 (H)   10/25/2017 108 (H)       GERD/Heartburn  Onset/Duration: Ongoing  Description: Heartburn  Intensity: mild  Progression of Symptoms: improving  Accompanying Signs & Symptoms:  Does it feel like food gets stuck or trouble swallowing: No  Nausea: No  Vomiting (bloody?): No  Abdominal Pain: No  Black-Tarry stools: No  Bloody stools: No  History:  Previous similar episodes: No  Previous ulcers: No  Precipitating factors:   Caffeine use: YES  Alcohol use: YES  NSAID/Aspirin use: No  Tobacco use: No  Worse with Any red sauces, miracle whip, haddad.  Alleviating factors: Medication  Therapies tried and outcome:             Lifestyle changes: Avoiding specific foods.            Medications: Omeprazole (Prilosec) and antacids occasionally.    She continues taking effexor for menopausal symptoms which she feels is helping.  Will continue current dose - 50mg daily.       Review of Systems  Constitutional, HEENT, cardiovascular, pulmonary, GI, , musculoskeletal, neuro, skin, endocrine and psych systems are negative, except as otherwise noted.      Objective    /76   Pulse 51   Temp 97.3  F (36.3  C) (Tympanic)   Resp 16   Ht 1.6 m (5' 3\")   Wt 72.3 kg (159 lb 4.8 oz)   LMP 04/12/2017 (Approximate)   SpO2 98%   BMI 28.22 kg/m    Body mass index is 28.22 kg/m .  Physical Exam   GENERAL: alert and no distress  NECK: no adenopathy, no asymmetry, masses, or scars, thyroid normal to palpation, and no carotid bruits  RESP: lungs clear to auscultation - no rales, rhonchi or wheezes  CV: regular rate and rhythm, normal S1 S2, no S3 or S4, no murmur  MS: " no gross musculoskeletal defects noted, no edema  PSYCH: mentation appears normal, affect normal/bright            Signed Electronically by: Kaila Henry, NP

## 2025-07-07 ENCOUNTER — OFFICE VISIT (OUTPATIENT)
Dept: FAMILY MEDICINE | Facility: OTHER | Age: 55
End: 2025-07-07
Attending: NURSE PRACTITIONER
Payer: COMMERCIAL

## 2025-07-07 VITALS
HEART RATE: 51 BPM | BODY MASS INDEX: 28.23 KG/M2 | RESPIRATION RATE: 16 BRPM | SYSTOLIC BLOOD PRESSURE: 124 MMHG | OXYGEN SATURATION: 98 % | HEIGHT: 63 IN | WEIGHT: 159.3 LBS | DIASTOLIC BLOOD PRESSURE: 76 MMHG | TEMPERATURE: 97.3 F

## 2025-07-07 DIAGNOSIS — R73.03 PREDIABETES: Primary | ICD-10-CM

## 2025-07-07 DIAGNOSIS — N95.1 MENOPAUSAL SYNDROME (HOT FLASHES): ICD-10-CM

## 2025-07-07 DIAGNOSIS — K21.9 GASTROESOPHAGEAL REFLUX DISEASE WITHOUT ESOPHAGITIS: ICD-10-CM

## 2025-07-07 LAB
ALBUMIN SERPL BCG-MCNC: 4.2 G/DL (ref 3.5–5.2)
ALP SERPL-CCNC: 57 U/L (ref 40–150)
ALT SERPL W P-5'-P-CCNC: 35 U/L (ref 0–50)
ANION GAP SERPL CALCULATED.3IONS-SCNC: 12 MMOL/L (ref 7–15)
AST SERPL W P-5'-P-CCNC: 27 U/L (ref 0–45)
BILIRUB SERPL-MCNC: 0.2 MG/DL
BUN SERPL-MCNC: 11.4 MG/DL (ref 6–20)
CALCIUM SERPL-MCNC: 9.8 MG/DL (ref 8.8–10.4)
CHLORIDE SERPL-SCNC: 104 MMOL/L (ref 98–107)
CHOLEST SERPL-MCNC: 245 MG/DL
CREAT SERPL-MCNC: 0.65 MG/DL (ref 0.51–0.95)
EGFRCR SERPLBLD CKD-EPI 2021: >90 ML/MIN/1.73M2
EST. AVERAGE GLUCOSE BLD GHB EST-MCNC: 126 MG/DL
FASTING STATUS PATIENT QL REPORTED: YES
FASTING STATUS PATIENT QL REPORTED: YES
GLUCOSE SERPL-MCNC: 125 MG/DL (ref 70–99)
HBA1C MFR BLD: 6 %
HCO3 SERPL-SCNC: 25 MMOL/L (ref 22–29)
HDLC SERPL-MCNC: 44 MG/DL
LDLC SERPL CALC-MCNC: 162 MG/DL
NONHDLC SERPL-MCNC: 201 MG/DL
POTASSIUM SERPL-SCNC: 4.9 MMOL/L (ref 3.4–5.3)
PROT SERPL-MCNC: 6.6 G/DL (ref 6.4–8.3)
SODIUM SERPL-SCNC: 141 MMOL/L (ref 135–145)
TRIGL SERPL-MCNC: 197 MG/DL
TSH SERPL DL<=0.005 MIU/L-ACNC: 1.35 UIU/ML (ref 0.3–4.2)

## 2025-07-07 ASSESSMENT — PAIN SCALES - GENERAL: PAINLEVEL_OUTOF10: NO PAIN (0)

## 2025-07-07 NOTE — PATIENT INSTRUCTIONS
"  Assessment & Plan     1. Prediabetes (Primary)  - Hemoglobin A1c; Future  - Lipid Profile; Future  - Comprehensive metabolic panel; Future  - TSH with free T4 reflex; Future    2. Menopausal syndrome (hot flashes)  Continue effexor    3. Gastroesophageal reflux disease without esophagitis  Continue omeprazole.         BMI  Estimated body mass index is 28.22 kg/m  as calculated from the following:    Height as of this encounter: 1.6 m (5' 3\").    Weight as of this encounter: 72.3 kg (159 lb 4.8 oz).   Weight management plan: Discussed healthy diet and exercise guidelines      Follow-up   Return in about 6 months (around 1/7/2026) for pre-diabetes, GERD.    "

## (undated) DEVICE — SOL WATER IRRIG 1000ML BOTTLE 2F7114

## (undated) DEVICE — LIGASURE-IMPACT SEALER/DIVIDER

## (undated) DEVICE — CONNECTOR-ERBEFLO 2 PORT

## (undated) DEVICE — PACK-GYN CYSTO-CUSTOM

## (undated) DEVICE — NDL-SPINAL 22G X 3.5IN QUINCKE

## (undated) DEVICE — CANISTER-SUCTION 2000CC

## (undated) DEVICE — BIN-UROLOGY / CYSTO

## (undated) DEVICE — TUBING-SUCTION 20FT

## (undated) DEVICE — TOWEL-OR DISP 4PKS

## (undated) DEVICE — SPONGE-LAP MINI 12 X 12

## (undated) DEVICE — BLADE-SCALPEL #10

## (undated) DEVICE — DRAPE-STERI 45X60CM #1010

## (undated) DEVICE — FORCEP-COLON BIOPSY STD W/NEEDLE 160CM

## (undated) DEVICE — SET-TUR Y-TYPE BLADDER IRRIGATION

## (undated) DEVICE — SYRINGE-30CC SLIP TIP

## (undated) DEVICE — SANITARY NAPKINS-SINGLE

## (undated) DEVICE — SUTURE-VICRYL 2-0 CT-1 J345H

## (undated) DEVICE — TUBING SUCTION 20FT N620A

## (undated) DEVICE — TRAY-SKIN PREP POVIDONE/IODINE

## (undated) DEVICE — CAUTERY PAD-POLYHESIVE II ADULT

## (undated) DEVICE — IRRIGATION-NACL 3000ML (BAG)

## (undated) DEVICE — SUTURE-VICRYL 3-0 SH J416H

## (undated) DEVICE — SUTURE-VICRYL 0 CT-1 POP-OFF J740D

## (undated) DEVICE — GLV-7.0 PROTEXIS PI CLASSIC LF/PF

## (undated) DEVICE — NDL COUNTER-20-40 CT MAGNET/FOAM BLOCK

## (undated) DEVICE — CONNECTOR ERBEFLO 2 PORT 20325-215

## (undated) DEVICE — BLADE-SURG CLIPPER

## (undated) DEVICE — LUBRICANT JELLY 2OZ. TUBE

## (undated) DEVICE — DRSG-SPONGE X-RAY 4 X 4

## (undated) DEVICE — CAUTERY-EXTENDED 6.5" BLADE

## (undated) DEVICE — SYRINGE-ASEPTO IRRIGATION

## (undated) DEVICE — PRESSURE INFUSOR DISP. 3000CC

## (undated) DEVICE — SUTURE-VICRYL 2-0 CT-1 J945H

## (undated) DEVICE — MOUTHPIECE W/GUARD FOR ENDOSCOPY

## (undated) DEVICE — SCD SLEEVE-THIGH REG.

## (undated) DEVICE — DRAPE-THREE QUARTER (LARGE) SHEET

## (undated) DEVICE — SNARE-ROTATABLE 20MM MINI OVAL

## (undated) DEVICE — GOWN-SURG XL LVL 3 REINFORCED

## (undated) DEVICE — TRAP-POLYP E-TRAP

## (undated) DEVICE — CAUTERY PENCIL

## (undated) DEVICE — IRRIGATION-NACL 1000ML

## (undated) DEVICE — CAUTERY TIP CLEANER

## (undated) DEVICE — LABEL-STERILE PREPRINTED FOR OR

## (undated) DEVICE — IRRIGATION-H2O 1000ML

## (undated) DEVICE — CAUTERY-MEGADYNE TIP

## (undated) DEVICE — SUTURE-VICRYL 0 CT-1 J346H

## (undated) DEVICE — GLV-8.5 BIOGEL LATEX

## (undated) DEVICE — CATH TRAY-16FR METER W/STATLOCK LATEX]

## (undated) DEVICE — SUTURE-VICRYL 0 CT-2 POP-OFF J727D

## (undated) DEVICE — LIGHT HANDLE COVER

## (undated) DEVICE — CATH-URETHRAL 14FR

## (undated) RX ORDER — LIDOCAINE HYDROCHLORIDE 20 MG/ML
INJECTION, SOLUTION EPIDURAL; INFILTRATION; INTRACAUDAL; PERINEURAL
Status: DISPENSED
Start: 2017-04-26

## (undated) RX ORDER — PROPOFOL 10 MG/ML
INJECTION, EMULSION INTRAVENOUS
Status: DISPENSED
Start: 2017-04-26

## (undated) RX ORDER — GLYCOPYRROLATE 0.2 MG/ML
INJECTION, SOLUTION INTRAMUSCULAR; INTRAVENOUS
Status: DISPENSED
Start: 2017-04-26

## (undated) RX ORDER — LIDOCAINE HYDROCHLORIDE 20 MG/ML
INJECTION, SOLUTION EPIDURAL; INFILTRATION; INTRACAUDAL; PERINEURAL
Status: DISPENSED
Start: 2022-01-20

## (undated) RX ORDER — KETOROLAC TROMETHAMINE 30 MG/ML
INJECTION, SOLUTION INTRAMUSCULAR; INTRAVENOUS
Status: DISPENSED
Start: 2017-04-26

## (undated) RX ORDER — NEOSTIGMINE METHYLSULFATE 1 MG/ML
VIAL (ML) INJECTION
Status: DISPENSED
Start: 2017-04-26

## (undated) RX ORDER — TRANEXAMIC ACID 100 MG/ML
INJECTION, SOLUTION INTRAVENOUS
Status: DISPENSED
Start: 2017-04-26

## (undated) RX ORDER — FENTANYL CITRATE 50 UG/ML
INJECTION, SOLUTION INTRAMUSCULAR; INTRAVENOUS
Status: DISPENSED
Start: 2017-04-26

## (undated) RX ORDER — DEXAMETHASONE SODIUM PHOSPHATE 10 MG/ML
INJECTION, SOLUTION INTRAMUSCULAR; INTRAVENOUS
Status: DISPENSED
Start: 2017-04-26

## (undated) RX ORDER — PROPOFOL 10 MG/ML
INJECTION, EMULSION INTRAVENOUS
Status: DISPENSED
Start: 2022-01-20

## (undated) RX ORDER — ONDANSETRON 2 MG/ML
INJECTION INTRAMUSCULAR; INTRAVENOUS
Status: DISPENSED
Start: 2017-04-26